# Patient Record
Sex: MALE | Race: WHITE | Employment: UNEMPLOYED | URBAN - NONMETROPOLITAN AREA
[De-identification: names, ages, dates, MRNs, and addresses within clinical notes are randomized per-mention and may not be internally consistent; named-entity substitution may affect disease eponyms.]

---

## 2018-04-28 ENCOUNTER — HOSPITAL ENCOUNTER (INPATIENT)
Age: 28
LOS: 1 days | Discharge: HOME | DRG: 897 | End: 2018-04-30
Attending: EMERGENCY MEDICINE | Admitting: PSYCHIATRY & NEUROLOGY
Payer: COMMERCIAL

## 2018-04-28 DIAGNOSIS — R44.0 AUDITORY HALLUCINATIONS: Primary | ICD-10-CM

## 2018-04-28 DIAGNOSIS — F29 PSYCHOSIS, UNSPECIFIED PSYCHOSIS TYPE (HCC): ICD-10-CM

## 2018-04-28 PROCEDURE — 99285 EMERGENCY DEPT VISIT HI MDM: CPT

## 2018-04-29 PROBLEM — F19.951: Status: ACTIVE | Noted: 2018-04-29

## 2018-04-29 PROBLEM — F19.94 SUBSTANCE INDUCED MOOD DISORDER (HCC): Status: ACTIVE | Noted: 2018-04-29

## 2018-04-29 LAB
ACETAMINOPHEN LEVEL: <15 UG/ML
ALBUMIN SERPL-MCNC: 4.8 G/DL (ref 3.5–5.2)
ALP BLD-CCNC: 71 U/L (ref 40–130)
ALT SERPL-CCNC: 7 U/L (ref 5–41)
AMPHETAMINE SCREEN, URINE: NEGATIVE
ANION GAP SERPL CALCULATED.3IONS-SCNC: 13 MMOL/L (ref 7–19)
AST SERPL-CCNC: 16 U/L (ref 5–40)
BARBITURATE SCREEN URINE: NEGATIVE
BASOPHILS ABSOLUTE: 0.1 K/UL (ref 0–0.2)
BASOPHILS RELATIVE PERCENT: 1 % (ref 0–1)
BENZODIAZEPINE SCREEN, URINE: NEGATIVE
BILIRUB SERPL-MCNC: <0.2 MG/DL (ref 0.2–1.2)
BUN BLDV-MCNC: 12 MG/DL (ref 6–20)
CALCIUM SERPL-MCNC: 8.7 MG/DL (ref 8.6–10)
CANNABINOID SCREEN URINE: NEGATIVE
CHLORIDE BLD-SCNC: 104 MMOL/L (ref 98–111)
CO2: 25 MMOL/L (ref 22–29)
COCAINE METABOLITE SCREEN URINE: NEGATIVE
CREAT SERPL-MCNC: 0.7 MG/DL (ref 0.5–1.2)
EOSINOPHILS ABSOLUTE: 0.2 K/UL (ref 0–0.6)
EOSINOPHILS RELATIVE PERCENT: 2.6 % (ref 0–5)
ETHANOL: 51 MG/DL (ref 0–0.08)
GFR NON-AFRICAN AMERICAN: >60
GLUCOSE BLD-MCNC: 103 MG/DL (ref 74–109)
HCT VFR BLD CALC: 47.3 % (ref 42–52)
HEMOGLOBIN: 15.6 G/DL (ref 14–18)
LYMPHOCYTES ABSOLUTE: 2.1 K/UL (ref 1.1–4.5)
LYMPHOCYTES RELATIVE PERCENT: 33.3 % (ref 20–40)
Lab: NORMAL
MCH RBC QN AUTO: 29.5 PG (ref 27–31)
MCHC RBC AUTO-ENTMCNC: 33 G/DL (ref 33–37)
MCV RBC AUTO: 89.4 FL (ref 80–94)
MONOCYTES ABSOLUTE: 0.3 K/UL (ref 0–0.9)
MONOCYTES RELATIVE PERCENT: 4.5 % (ref 0–10)
NEUTROPHILS ABSOLUTE: 3.7 K/UL (ref 1.5–7.5)
NEUTROPHILS RELATIVE PERCENT: 58.4 % (ref 50–65)
OPIATE SCREEN URINE: NEGATIVE
PDW BLD-RTO: 12.8 % (ref 11.5–14.5)
PLATELET # BLD: 288 K/UL (ref 130–400)
PMV BLD AUTO: 9.6 FL (ref 9.4–12.4)
POTASSIUM SERPL-SCNC: 4 MMOL/L (ref 3.5–5)
RBC # BLD: 5.29 M/UL (ref 4.7–6.1)
SALICYLATE, SERUM: <3 MG/DL (ref 3–10)
SODIUM BLD-SCNC: 142 MMOL/L (ref 136–145)
TOTAL PROTEIN: 7.8 G/DL (ref 6.6–8.7)
WBC # BLD: 6.3 K/UL (ref 4.8–10.8)

## 2018-04-29 PROCEDURE — 99284 EMERGENCY DEPT VISIT MOD MDM: CPT | Performed by: EMERGENCY MEDICINE

## 2018-04-29 PROCEDURE — G0480 DRUG TEST DEF 1-7 CLASSES: HCPCS

## 2018-04-29 PROCEDURE — 6370000000 HC RX 637 (ALT 250 FOR IP): Performed by: PSYCHIATRY & NEUROLOGY

## 2018-04-29 PROCEDURE — 36415 COLL VENOUS BLD VENIPUNCTURE: CPT

## 2018-04-29 PROCEDURE — 85025 COMPLETE CBC W/AUTO DIFF WBC: CPT

## 2018-04-29 PROCEDURE — 80307 DRUG TEST PRSMV CHEM ANLYZR: CPT

## 2018-04-29 PROCEDURE — 1240000000 HC EMOTIONAL WELLNESS R&B

## 2018-04-29 PROCEDURE — 80053 COMPREHEN METABOLIC PANEL: CPT

## 2018-04-29 PROCEDURE — 99222 1ST HOSP IP/OBS MODERATE 55: CPT | Performed by: NURSE PRACTITIONER

## 2018-04-29 RX ORDER — ACETAMINOPHEN 325 MG/1
650 TABLET ORAL EVERY 4 HOURS PRN
Status: DISCONTINUED | OUTPATIENT
Start: 2018-04-29 | End: 2018-04-30 | Stop reason: HOSPADM

## 2018-04-29 RX ORDER — RISPERIDONE 0.5 MG/1
0.25 TABLET, FILM COATED ORAL 2 TIMES DAILY
Status: DISCONTINUED | OUTPATIENT
Start: 2018-04-29 | End: 2018-04-30 | Stop reason: HOSPADM

## 2018-04-29 RX ADMIN — RISPERIDONE 0.25 MG: 0.5 TABLET, FILM COATED ORAL at 11:50

## 2018-04-29 RX ADMIN — RISPERIDONE 0.25 MG: 0.5 TABLET, FILM COATED ORAL at 21:06

## 2018-04-29 ASSESSMENT — SLEEP AND FATIGUE QUESTIONNAIRES
DO YOU HAVE DIFFICULTY SLEEPING: NO
AVERAGE NUMBER OF SLEEP HOURS: 10
DO YOU USE A SLEEP AID: NO

## 2018-04-29 ASSESSMENT — ENCOUNTER SYMPTOMS
EYES NEGATIVE: 1
GASTROINTESTINAL NEGATIVE: 1
RESPIRATORY NEGATIVE: 1

## 2018-04-29 ASSESSMENT — PATIENT HEALTH QUESTIONNAIRE - PHQ9: SUM OF ALL RESPONSES TO PHQ QUESTIONS 1-9: 10

## 2018-04-29 ASSESSMENT — LIFESTYLE VARIABLES: HISTORY_ALCOHOL_USE: YES

## 2018-04-30 VITALS
WEIGHT: 148.8 LBS | OXYGEN SATURATION: 98 % | HEIGHT: 71 IN | TEMPERATURE: 97.6 F | SYSTOLIC BLOOD PRESSURE: 103 MMHG | DIASTOLIC BLOOD PRESSURE: 70 MMHG | BODY MASS INDEX: 20.83 KG/M2 | RESPIRATION RATE: 16 BRPM | HEART RATE: 90 BPM

## 2018-04-30 PROCEDURE — 6370000000 HC RX 637 (ALT 250 FOR IP): Performed by: PSYCHIATRY & NEUROLOGY

## 2018-04-30 PROCEDURE — 99238 HOSP IP/OBS DSCHRG MGMT 30/<: CPT | Performed by: PSYCHIATRY & NEUROLOGY

## 2018-04-30 PROCEDURE — 5130000000 HC BRIDGE APPOINTMENT

## 2018-04-30 RX ORDER — RISPERIDONE 0.25 MG/1
0.25 TABLET, FILM COATED ORAL 2 TIMES DAILY
Qty: 30 TABLET | Refills: 0 | Status: SHIPPED | OUTPATIENT
Start: 2018-04-30 | End: 2020-03-23

## 2018-04-30 RX ADMIN — RISPERIDONE 0.25 MG: 0.5 TABLET, FILM COATED ORAL at 08:32

## 2018-05-10 ENCOUNTER — OFFICE VISIT (OUTPATIENT)
Dept: RETAIL CLINIC | Facility: CLINIC | Age: 28
End: 2018-05-10

## 2018-05-10 VITALS
BODY MASS INDEX: 21.31 KG/M2 | HEART RATE: 104 BPM | DIASTOLIC BLOOD PRESSURE: 64 MMHG | WEIGHT: 152.2 LBS | RESPIRATION RATE: 20 BRPM | TEMPERATURE: 98.2 F | OXYGEN SATURATION: 98 % | HEIGHT: 71 IN | SYSTOLIC BLOOD PRESSURE: 110 MMHG

## 2018-05-10 DIAGNOSIS — H10.31 ACUTE BACTERIAL CONJUNCTIVITIS OF RIGHT EYE: Primary | ICD-10-CM

## 2018-05-10 PROCEDURE — 99203 OFFICE O/P NEW LOW 30 MIN: CPT | Performed by: ADVANCED PRACTICE MIDWIFE

## 2018-05-10 RX ORDER — NEOMYCIN/POLYMYXIN B/HYDROCORT 3.5-10K-1
2 SUSPENSION, DROPS(FINAL DOSAGE FORM)(ML) OPHTHALMIC (EYE)
Qty: 7.5 ML | Refills: 0 | Status: SHIPPED | OUTPATIENT
Start: 2018-05-10

## 2018-05-10 NOTE — PROGRESS NOTES
Chief Complaint   Patient presents with   • Eye Pain     Subjective   Figueroa Swann is a 27 y.o. male who presents to the clinic today with complaints   Eye Pain    The right eye is affected. This is a new problem. Episode onset: 2 days. The problem occurs rarely. The problem has been gradually worsening. There was no injury mechanism. The pain is moderate. There is no known exposure to pink eye. He wears contacts (states took that contact out and threw it away; not wearing corrective lens in the affected eye). Associated symptoms include an eye discharge (this morning it was matted; last night there was also discharge from the eye), eye redness and a foreign body sensation. Pertinent negatives include no double vision, fever, itching, nausea, photophobia, recent URI or vomiting. Blurred vision: no more than usual when not wearing corrective lens. He has tried water for the symptoms. The treatment provided no relief.         Current Outpatient Prescriptions:   None    Allergies:  Review of patient's allergies indicates no known allergies.    Past Medical History:   Diagnosis Date   • Allergic    • Anxiety    • Bipolar disorder    • Depression      History reviewed. No pertinent surgical history.  History reviewed. No pertinent family history.  Social History   Substance Use Topics   • Smoking status: Former Smoker   • Smokeless tobacco: Not on file   • Alcohol use Not on file       Review of Systems  Review of Systems   Constitutional: Negative for fever.   HENT: Positive for rhinorrhea (clear) and sneezing. Negative for ear discharge, ear pain (feel a little plugged) and sore throat.    Eyes: Positive for pain, discharge (this morning it was matted; last night there was also discharge from the eye) and redness. Negative for double vision, photophobia, itching and visual disturbance (see HPI). Blurred vision: no more than usual when not wearing corrective lens.   Gastrointestinal: Negative for nausea and  "vomiting.   Skin: Negative for itching.   All other systems reviewed and are negative.      Objective   /64 (BP Location: Left arm, Patient Position: Sitting, Cuff Size: Adult)   Pulse 104   Temp 98.2 °F (36.8 °C) (Oral)   Resp 20   Ht 180.3 cm (71\")   Wt 69 kg (152 lb 3.2 oz)   SpO2 98%   BMI 21.23 kg/m²       Physical Exam   Constitutional: He is oriented to person, place, and time. Vital signs are normal. He appears well-developed and well-nourished. He is cooperative. He does not appear ill. No distress.   HENT:   Head: Normocephalic.   Right Ear: Hearing, tympanic membrane, external ear and ear canal normal.   Left Ear: Hearing, tympanic membrane, external ear and ear canal normal.   Nose: Nose normal.   Mouth/Throat: Uvula is midline, oropharynx is clear and moist and mucous membranes are normal. No oropharyngeal exudate. Tonsils are 1+ on the right. Tonsils are 1+ on the left. No tonsillar exudate.   Eyes: EOM and lids are normal. Pupils are equal, round, and reactive to light. Right eye exhibits chemosis. Right eye exhibits no discharge and no exudate. Left eye exhibits no discharge and no exudate. Right conjunctiva is injected. Right conjunctiva has no hemorrhage. Left conjunctiva is not injected. Left conjunctiva has no hemorrhage.   Cardiovascular: Normal rate, regular rhythm and normal heart sounds.    Pulmonary/Chest: Effort normal and breath sounds normal. No respiratory distress. He has no wheezes.   Neurological: He is alert and oriented to person, place, and time.   Skin: Skin is warm and dry.   Psychiatric: He has a normal mood and affect. His behavior is normal.       Assessment/Plan     Figueroa was seen today for eye pain.    Diagnoses and all orders for this visit:    Acute bacterial conjunctivitis of right eye  -     neomycin-polymyxin-hydrocortisone (CORTISPORIN) 3.5-61011-9 ophthalmic suspension; Administer 2 drops to both eyes Every 4 (Four) Hours While Awake.            See " patient education instructions. Do not wear contact lenses while using eye gtts. Wait for the irritation to be completely gone. We will treat both eyes to prevent spread of infection to unaffected eye. Wash hands thoroughly. Change pillow case nightly to prevent reinfection. If you experience any visual disturbances out of your usual due to not wearing corrective lenses and/or if there is no improvement after 48 hours use of gtts or if you have more eye pain, go immediately to your eye doctor or the ER for further evaluation.

## 2018-05-10 NOTE — PATIENT INSTRUCTIONS
Bacterial Conjunctivitis  Bacterial conjunctivitis is an infection of your conjunctiva. This is the clear membrane that covers the white part of your eye and the inner surface of your eyelid. This condition can make your eye:  · Red or pink.  · Itchy.  This condition is caused by bacteria. This condition spreads very easily from person to person (is contagious) and from one eye to the other eye.  Follow these instructions at home:  Medicines   · Take or apply your antibiotic medicine as told by your doctor. Do not stop taking or applying the antibiotic even if you start to feel better.  · Take or apply over-the-counter and prescription medicines only as told by your doctor.  · Do not touch your eyelid with the eye drop bottle or the ointment tube.  Managing discomfort   · Wipe any fluid from your eye with a warm, wet washcloth or a cotton ball.  · Place a cool, clean washcloth on your eye. Do this for 10-20 minutes, 3-4 times per day.  General instructions   · Do not wear contact lenses until the irritation is gone. Wear glasses until your doctor says it is okay to wear contacts.  · Do not wear eye makeup until your symptoms are gone. Throw away any old makeup.  · Change or wash your pillowcase every day.  · Do not share towels or washcloths with anyone.  · Wash your hands often with soap and water. Use paper towels to dry your hands.  · Do not touch or rub your eyes.  · Do not drive or use heavy machinery if your vision is blurry.  Contact a doctor if:  · You have a fever.  · Your symptoms do not get better after 10 days.  Get help right away if:  · You have a fever and your symptoms suddenly get worse.  · You have very bad pain when you move your eye.  · Your face:  ¨ Hurts.  ¨ Is red.  ¨ Is swollen.  · You have sudden loss of vision.  This information is not intended to replace advice given to you by your health care provider. Make sure you discuss any questions you have with your health care provider.  Document  Released: 09/26/2009 Document Revised: 05/25/2017 Document Reviewed: 09/29/2016  SmartPill Interactive Patient Education © 2017 [employer]  Dexamethasone; Neomycin; Polymyxin B ophthalmic suspension  What is this medicine?  DEXAMETHASONE; NEOMYCIN; POLYMYXIN B (dex a METH a sone; nee oh MYE sin; cindy i MIX in B) is a combination of a steroid and antibiotics. It helps to reduce swelling, redness, and itching of the eye. It also is used to treat eye infections.  This medicine may be used for other purposes; ask your health care provider or pharmacist if you have questions.  COMMON BRAND NAME(S): AK-Trol, Dexacidin, Dexasporin, Gatol, Maxitrol, Methadex, NeoPolyDex, Ocu-Trol, Poly-Dex  What should I tell my health care provider before I take this medicine?  They need to know if you have any of these conditions:  -any other active infection  -glaucoma  -wear contact lenses  -an unusual or allergic reaction to dexamethasone, neomycin, polymyxin B, corticosteroids, other medicines, foods, dyes, or preservatives  -pregnant or trying to get pregnant  -breast-feeding  How should I use this medicine?  This medicine is only for use in the eye. Follow the directions on the prescription label. Wash hands before and after use. Shake suspension well before using. Tilt your head back slightly and pull your lower eyelid down with your index finger to form a pouch. Try not to touch the tip of the dropper to your eye, fingertips, or other surface. Squeeze the prescribed number of drops into the pouch. Close the eye gently to spread the drops. Do not use your medicine more often than directed. Finish the full course of medicine prescribed by your doctor or health care professional even if think your condition is better. Do not stop using except on the advice of your doctor or health care professional. Do not use for longer than instructed by your doctor or health care professional.  Talk to your pediatrician regarding the use of this  medicine in children. Special care may be needed.  Overdosage: If you think you have taken too much of this medicine contact a poison control center or emergency room at once.  NOTE: This medicine is only for you. Do not share this medicine with others.  What if I miss a dose?  If you miss a dose, use it as soon as you can. If it is almost time for your next dose, use only that dose. Do not use double or extra doses.  What may interact with this medicine?  Interactions are not expected. Do not use any other eye products without asking your doctor or health care professional.  This list may not describe all possible interactions. Give your health care provider a list of all the medicines, herbs, non-prescription drugs, or dietary supplements you use. Also tell them if you smoke, drink alcohol, or use illegal drugs. Some items may interact with your medicine.  What should I watch for while using this medicine?  Check with your doctor or health care professional if your condition does not start to get better, or if it gets worse.  Tell your doctor or health care professional if you are exposed to anyone with measles or chickenpox, or if you develop sores or blisters that do not heal properly.  If you wear contact lenses, ask your doctor or health care professional when you can use your lenses again.  A burning or stinging reaction that does not go away may mean you are allergic to this product. Stop using and call your doctor or health care professional.  This medicine can make certain eye conditions worse. Only use it for conditions for which your doctor or health care professional has prescribed.  To prevent the spread of infection, do not share eye products, towels, and washcloths with anyone else.  What side effects may I notice from receiving this medicine?  Side effects that you should report to your doctor or health care professional as soon as possible:  -allergic reactions like skin rash, itching or hives,  swelling of the face, lips, or tongue  -changes in vision  -eye pain  -severe burning, stinging or swelling of the eyelids  -watery eyes  Side effects that usually do not require medical attention (report to your doctor or health care professional if they continue or are bothersome):  -mild burning, redness or stinging in the eye  -eye irritation, itching  -temporary watering or blurring of vision  This list may not describe all possible side effects. Call your doctor for medical advice about side effects. You may report side effects to FDA at 4-700-FDA-5709.  Where should I keep my medicine?  Keep out of the reach of children.  See product for storage instructions. Each product may have different instructions. Throw away any unused medicine after the expiration date.  NOTE: This sheet is a summary. It may not cover all possible information. If you have questions about this medicine, talk to your doctor, pharmacist, or health care provider.  © 2018 Elsevier/Gold Standard (2017-04-14 11:56:17)

## 2018-05-16 ENCOUNTER — HOSPITAL ENCOUNTER (EMERGENCY)
Age: 28
Discharge: LEFT BEFORE DISCHARGE | End: 2018-05-16
Payer: COMMERCIAL

## 2018-05-16 PROCEDURE — 4500000002 HC ER NO CHARGE

## 2018-06-01 ASSESSMENT — ENCOUNTER SYMPTOMS
CHEST TIGHTNESS: 0
SHORTNESS OF BREATH: 0
ABDOMINAL PAIN: 0

## 2020-03-23 ENCOUNTER — HOSPITAL ENCOUNTER (OUTPATIENT)
Age: 30
Setting detail: OBSERVATION
Discharge: HOME | End: 2020-03-24
Attending: EMERGENCY MEDICINE | Admitting: HOSPITALIST
Payer: MEDICAID

## 2020-03-23 PROBLEM — F29 PSYCHOSIS (HCC): Status: ACTIVE | Noted: 2020-03-23

## 2020-03-23 PROBLEM — F10.959: Status: ACTIVE | Noted: 2020-03-23

## 2020-03-23 PROBLEM — F10.11 HISTORY OF ALCOHOL ABUSE: Status: ACTIVE | Noted: 2020-03-23

## 2020-03-23 PROBLEM — E88.89 KETOSIS (HCC): Status: ACTIVE | Noted: 2020-03-23

## 2020-03-23 LAB
ACETAMINOPHEN LEVEL: <15 UG/ML
ALBUMIN SERPL-MCNC: 4.7 G/DL (ref 3.5–5.2)
ALP BLD-CCNC: 69 U/L (ref 40–130)
ALT SERPL-CCNC: 12 U/L (ref 5–41)
AMPHETAMINE SCREEN, URINE: NEGATIVE
ANION GAP SERPL CALCULATED.3IONS-SCNC: 22 MMOL/L (ref 7–19)
AST SERPL-CCNC: 33 U/L (ref 5–40)
BACTERIA: NEGATIVE /HPF
BARBITURATE SCREEN URINE: NEGATIVE
BASOPHILS ABSOLUTE: 0.1 K/UL (ref 0–0.2)
BASOPHILS RELATIVE PERCENT: 1 % (ref 0–1)
BENZODIAZEPINE SCREEN, URINE: NEGATIVE
BILIRUB SERPL-MCNC: 0.7 MG/DL (ref 0.2–1.2)
BILIRUBIN URINE: NEGATIVE
BLOOD, URINE: NEGATIVE
BUN BLDV-MCNC: 18 MG/DL (ref 6–20)
CALCIUM SERPL-MCNC: 9.5 MG/DL (ref 8.6–10)
CANNABINOID SCREEN URINE: NEGATIVE
CHLORIDE BLD-SCNC: 96 MMOL/L (ref 98–111)
CLARITY: CLEAR
CO2: 20 MMOL/L (ref 22–29)
COCAINE METABOLITE SCREEN URINE: NEGATIVE
COLOR: YELLOW
CREAT SERPL-MCNC: 0.8 MG/DL (ref 0.5–1.2)
EOSINOPHILS ABSOLUTE: 0 K/UL (ref 0–0.6)
EOSINOPHILS RELATIVE PERCENT: 0 % (ref 0–5)
EPITHELIAL CELLS, UA: 1 /HPF (ref 0–5)
ETHANOL: <10 MG/DL (ref 0–0.08)
GFR NON-AFRICAN AMERICAN: >60
GLUCOSE BLD-MCNC: 103 MG/DL (ref 74–109)
GLUCOSE BLD-MCNC: 108 MG/DL (ref 70–99)
GLUCOSE URINE: NEGATIVE MG/DL
HCT VFR BLD CALC: 39.8 % (ref 42–52)
HEMOGLOBIN: 13.7 G/DL (ref 14–18)
HYALINE CASTS: 4 /HPF (ref 0–8)
IMMATURE GRANULOCYTES #: 0 K/UL
INR BLD: 1.02 (ref 0.88–1.18)
KETONES, URINE: 80 MG/DL
LEUKOCYTE ESTERASE, URINE: NEGATIVE
LIPASE: 32 U/L (ref 13–60)
LYMPHOCYTES ABSOLUTE: 1.3 K/UL (ref 1.1–4.5)
LYMPHOCYTES RELATIVE PERCENT: 13.4 % (ref 20–40)
Lab: NORMAL
MCH RBC QN AUTO: 29.7 PG (ref 27–31)
MCHC RBC AUTO-ENTMCNC: 34.4 G/DL (ref 33–37)
MCV RBC AUTO: 86.3 FL (ref 80–94)
MONOCYTES ABSOLUTE: 0.4 K/UL (ref 0–0.9)
MONOCYTES RELATIVE PERCENT: 3.6 % (ref 0–10)
NEUTROPHILS ABSOLUTE: 7.9 K/UL (ref 1.5–7.5)
NEUTROPHILS RELATIVE PERCENT: 81.8 % (ref 50–65)
NITRITE, URINE: NEGATIVE
OPIATE SCREEN URINE: NEGATIVE
PDW BLD-RTO: 12.4 % (ref 11.5–14.5)
PERFORMED ON: ABNORMAL
PH UA: 6 (ref 5–8)
PLATELET # BLD: 319 K/UL (ref 130–400)
PMV BLD AUTO: 10 FL (ref 9.4–12.4)
POTASSIUM SERPL-SCNC: 4 MMOL/L (ref 3.5–5)
PROTEIN UA: 30 MG/DL
PROTHROMBIN TIME: 13.4 SEC (ref 12–14.6)
RBC # BLD: 4.61 M/UL (ref 4.7–6.1)
RBC UA: 3 /HPF (ref 0–4)
REASON FOR REJECTION: NORMAL
REJECTED TEST: NORMAL
SALICYLATE, SERUM: <3 MG/DL (ref 3–10)
SODIUM BLD-SCNC: 138 MMOL/L (ref 136–145)
SPECIFIC GRAVITY UA: 1.03 (ref 1–1.03)
TOTAL PROTEIN: 7.7 G/DL (ref 6.6–8.7)
URINE REFLEX TO CULTURE: ABNORMAL
UROBILINOGEN, URINE: 0.2 E.U./DL
WBC # BLD: 9.6 K/UL (ref 4.8–10.8)
WBC UA: 1 /HPF (ref 0–5)

## 2020-03-23 PROCEDURE — 85610 PROTHROMBIN TIME: CPT

## 2020-03-23 PROCEDURE — 96375 TX/PRO/DX INJ NEW DRUG ADDON: CPT

## 2020-03-23 PROCEDURE — 36415 COLL VENOUS BLD VENIPUNCTURE: CPT

## 2020-03-23 PROCEDURE — 82947 ASSAY GLUCOSE BLOOD QUANT: CPT

## 2020-03-23 PROCEDURE — 80307 DRUG TEST PRSMV CHEM ANLYZR: CPT

## 2020-03-23 PROCEDURE — G0378 HOSPITAL OBSERVATION PER HR: HCPCS

## 2020-03-23 PROCEDURE — 81001 URINALYSIS AUTO W/SCOPE: CPT

## 2020-03-23 PROCEDURE — 83690 ASSAY OF LIPASE: CPT

## 2020-03-23 PROCEDURE — G0480 DRUG TEST DEF 1-7 CLASSES: HCPCS

## 2020-03-23 PROCEDURE — 6360000002 HC RX W HCPCS: Performed by: PHYSICIAN ASSISTANT

## 2020-03-23 PROCEDURE — 80053 COMPREHEN METABOLIC PANEL: CPT

## 2020-03-23 PROCEDURE — 96361 HYDRATE IV INFUSION ADD-ON: CPT

## 2020-03-23 PROCEDURE — 96374 THER/PROPH/DIAG INJ IV PUSH: CPT

## 2020-03-23 PROCEDURE — 6370000000 HC RX 637 (ALT 250 FOR IP): Performed by: PHYSICIAN ASSISTANT

## 2020-03-23 PROCEDURE — 6360000002 HC RX W HCPCS: Performed by: EMERGENCY MEDICINE

## 2020-03-23 PROCEDURE — 85025 COMPLETE CBC W/AUTO DIFF WBC: CPT

## 2020-03-23 PROCEDURE — 96372 THER/PROPH/DIAG INJ SC/IM: CPT

## 2020-03-23 PROCEDURE — 99285 EMERGENCY DEPT VISIT HI MDM: CPT

## 2020-03-23 PROCEDURE — 6370000000 HC RX 637 (ALT 250 FOR IP): Performed by: EMERGENCY MEDICINE

## 2020-03-23 PROCEDURE — 2500000003 HC RX 250 WO HCPCS: Performed by: EMERGENCY MEDICINE

## 2020-03-23 PROCEDURE — 2580000003 HC RX 258: Performed by: EMERGENCY MEDICINE

## 2020-03-23 PROCEDURE — 2580000003 HC RX 258: Performed by: PHYSICIAN ASSISTANT

## 2020-03-23 RX ORDER — LORAZEPAM 1 MG/1
1 TABLET ORAL
Status: DISCONTINUED | OUTPATIENT
Start: 2020-03-23 | End: 2020-03-24 | Stop reason: HOSPADM

## 2020-03-23 RX ORDER — LORAZEPAM 2 MG/ML
2 INJECTION INTRAMUSCULAR
Status: DISCONTINUED | OUTPATIENT
Start: 2020-03-23 | End: 2020-03-23

## 2020-03-23 RX ORDER — LORAZEPAM 1 MG/1
3 TABLET ORAL
Status: DISCONTINUED | OUTPATIENT
Start: 2020-03-23 | End: 2020-03-23

## 2020-03-23 RX ORDER — LORAZEPAM 1 MG/1
2 TABLET ORAL
Status: DISCONTINUED | OUTPATIENT
Start: 2020-03-23 | End: 2020-03-24 | Stop reason: HOSPADM

## 2020-03-23 RX ORDER — LORAZEPAM 2 MG/ML
1 INJECTION INTRAMUSCULAR
Status: DISCONTINUED | OUTPATIENT
Start: 2020-03-23 | End: 2020-03-24 | Stop reason: HOSPADM

## 2020-03-23 RX ORDER — SODIUM CHLORIDE 0.9 % (FLUSH) 0.9 %
10 SYRINGE (ML) INJECTION PRN
Status: DISCONTINUED | OUTPATIENT
Start: 2020-03-23 | End: 2020-03-24 | Stop reason: HOSPADM

## 2020-03-23 RX ORDER — SODIUM CHLORIDE 9 MG/ML
INJECTION, SOLUTION INTRAVENOUS CONTINUOUS
Status: DISCONTINUED | OUTPATIENT
Start: 2020-03-23 | End: 2020-03-24

## 2020-03-23 RX ORDER — LORAZEPAM 1 MG/1
1 TABLET ORAL ONCE
Status: COMPLETED | OUTPATIENT
Start: 2020-03-23 | End: 2020-03-23

## 2020-03-23 RX ORDER — LORAZEPAM 2 MG/ML
1 INJECTION INTRAMUSCULAR ONCE
Status: COMPLETED | OUTPATIENT
Start: 2020-03-23 | End: 2020-03-23

## 2020-03-23 RX ORDER — FAMOTIDINE 20 MG/1
20 TABLET, FILM COATED ORAL 2 TIMES DAILY
Status: DISCONTINUED | OUTPATIENT
Start: 2020-03-23 | End: 2020-03-24 | Stop reason: HOSPADM

## 2020-03-23 RX ORDER — MAGNESIUM SULFATE IN WATER 40 MG/ML
2 INJECTION, SOLUTION INTRAVENOUS PRN
Status: DISCONTINUED | OUTPATIENT
Start: 2020-03-23 | End: 2020-03-24 | Stop reason: HOSPADM

## 2020-03-23 RX ORDER — LORAZEPAM 2 MG/ML
4 INJECTION INTRAMUSCULAR
Status: DISCONTINUED | OUTPATIENT
Start: 2020-03-23 | End: 2020-03-23

## 2020-03-23 RX ORDER — POTASSIUM CHLORIDE 7.45 MG/ML
10 INJECTION INTRAVENOUS PRN
Status: DISCONTINUED | OUTPATIENT
Start: 2020-03-23 | End: 2020-03-24 | Stop reason: HOSPADM

## 2020-03-23 RX ORDER — LORAZEPAM 1 MG/1
4 TABLET ORAL
Status: DISCONTINUED | OUTPATIENT
Start: 2020-03-23 | End: 2020-03-23

## 2020-03-23 RX ORDER — ACETAMINOPHEN 650 MG/1
650 SUPPOSITORY RECTAL EVERY 6 HOURS PRN
Status: DISCONTINUED | OUTPATIENT
Start: 2020-03-23 | End: 2020-03-24 | Stop reason: HOSPADM

## 2020-03-23 RX ORDER — PROMETHAZINE HYDROCHLORIDE 12.5 MG/1
12.5 TABLET ORAL EVERY 6 HOURS PRN
Status: DISCONTINUED | OUTPATIENT
Start: 2020-03-23 | End: 2020-03-24 | Stop reason: HOSPADM

## 2020-03-23 RX ORDER — ACETAMINOPHEN 325 MG/1
650 TABLET ORAL EVERY 6 HOURS PRN
Status: DISCONTINUED | OUTPATIENT
Start: 2020-03-23 | End: 2020-03-24 | Stop reason: HOSPADM

## 2020-03-23 RX ORDER — LORAZEPAM 2 MG/ML
3 INJECTION INTRAMUSCULAR
Status: DISCONTINUED | OUTPATIENT
Start: 2020-03-23 | End: 2020-03-23

## 2020-03-23 RX ORDER — ONDANSETRON 2 MG/ML
4 INJECTION INTRAMUSCULAR; INTRAVENOUS EVERY 6 HOURS PRN
Status: DISCONTINUED | OUTPATIENT
Start: 2020-03-23 | End: 2020-03-24 | Stop reason: HOSPADM

## 2020-03-23 RX ORDER — POTASSIUM CHLORIDE 20 MEQ/1
40 TABLET, EXTENDED RELEASE ORAL PRN
Status: DISCONTINUED | OUTPATIENT
Start: 2020-03-23 | End: 2020-03-24 | Stop reason: HOSPADM

## 2020-03-23 RX ORDER — NICOTINE 21 MG/24HR
1 PATCH, TRANSDERMAL 24 HOURS TRANSDERMAL DAILY
Status: DISCONTINUED | OUTPATIENT
Start: 2020-03-23 | End: 2020-03-24 | Stop reason: HOSPADM

## 2020-03-23 RX ORDER — SODIUM CHLORIDE 0.9 % (FLUSH) 0.9 %
10 SYRINGE (ML) INJECTION EVERY 12 HOURS SCHEDULED
Status: DISCONTINUED | OUTPATIENT
Start: 2020-03-23 | End: 2020-03-24 | Stop reason: HOSPADM

## 2020-03-23 RX ORDER — FOLIC ACID 1 MG/1
1 TABLET ORAL DAILY
Status: DISCONTINUED | OUTPATIENT
Start: 2020-03-23 | End: 2020-03-24 | Stop reason: HOSPADM

## 2020-03-23 RX ORDER — THIAMINE MONONITRATE (VIT B1) 100 MG
100 TABLET ORAL DAILY
Status: DISCONTINUED | OUTPATIENT
Start: 2020-03-23 | End: 2020-03-24 | Stop reason: HOSPADM

## 2020-03-23 RX ORDER — MULTIVITAMIN WITH FOLIC ACID 400 MCG
1 TABLET ORAL DAILY
Status: DISCONTINUED | OUTPATIENT
Start: 2020-03-23 | End: 2020-03-24 | Stop reason: HOSPADM

## 2020-03-23 RX ADMIN — LORAZEPAM 1 MG: 1 TABLET ORAL at 13:41

## 2020-03-23 RX ADMIN — Medication 100 MG: at 15:17

## 2020-03-23 RX ADMIN — SODIUM CHLORIDE: 9 INJECTION, SOLUTION INTRAVENOUS at 15:18

## 2020-03-23 RX ADMIN — FOLIC ACID 1 MG: 1 TABLET ORAL at 15:17

## 2020-03-23 RX ADMIN — SODIUM CHLORIDE, PRESERVATIVE FREE 10 ML: 5 INJECTION INTRAVENOUS at 21:05

## 2020-03-23 RX ADMIN — THERA TABS 1 TABLET: TAB at 15:17

## 2020-03-23 RX ADMIN — FOLIC ACID: 5 INJECTION, SOLUTION INTRAMUSCULAR; INTRAVENOUS; SUBCUTANEOUS at 12:26

## 2020-03-23 RX ADMIN — FAMOTIDINE 20 MG: 20 TABLET ORAL at 21:05

## 2020-03-23 RX ADMIN — ENOXAPARIN SODIUM 40 MG: 40 INJECTION SUBCUTANEOUS at 15:17

## 2020-03-23 RX ADMIN — SODIUM CHLORIDE: 9 INJECTION, SOLUTION INTRAVENOUS at 23:16

## 2020-03-23 RX ADMIN — FAMOTIDINE 20 MG: 20 TABLET ORAL at 15:17

## 2020-03-23 RX ADMIN — LORAZEPAM 1 MG: 2 INJECTION INTRAMUSCULAR; INTRAVENOUS at 13:41

## 2020-03-23 ASSESSMENT — PATIENT HEALTH QUESTIONNAIRE - PHQ9: SUM OF ALL RESPONSES TO PHQ QUESTIONS 1-9: 10

## 2020-03-23 ASSESSMENT — ENCOUNTER SYMPTOMS
ABDOMINAL PAIN: 0
BACK PAIN: 0
COUGH: 0
CHEST TIGHTNESS: 0
SHORTNESS OF BREATH: 0
NAUSEA: 1
EYE ITCHING: 0
EYE PAIN: 0
SORE THROAT: 0
WHEEZING: 0
SINUS PRESSURE: 0
ABDOMINAL PAIN: 1
ABDOMINAL DISTENTION: 0
PHOTOPHOBIA: 0
DIARRHEA: 0
SINUS PAIN: 0

## 2020-03-23 ASSESSMENT — PAIN SCALES - GENERAL
PAINLEVEL_OUTOF10: 0
PAINLEVEL_OUTOF10: 0

## 2020-03-23 NOTE — H&P
Raul Bills - History & Physical      PCP: No primary care provider on file. Date of Admission: 3/23/2020    Date of Service: 3/23/2020    Chief Complaint:  \"Hearing voices\"    History Of Present Illness: The patient is a 34 y.o. male PMH of Bipolar 1, PTSD, and alcohol abuse who presented to [address] [place] [address] ED \"because the voices told him to\". Patient is resting in bed upon evaluation in no acute distress. He appears anxious and nervous. Patient states that he was released from prison on Thursday and has been \"sleeping on the streets in a sleeping bag\" since. Patient admits to drinking daily since Thursday and states his last drink was this morning around 7am. Patient endorses nausea, shakiness, abdominal pain, and feeling his heart \"flop around\" over the last few days. He states when he gets feeling this way and really \"sick\" he starts to feel \"worthless\". He admits to leg pain/cramping. He denies chest pain, shortness of breath, and dizziness. He denies hearing any voices, or visual disturbances at this moment. In ED patient was given Ativan 2mg and banana bag. Patient is tachycardic at arrival 130 bpm with no fever and BP at 107/89. Labs reveal pt has Ketones and protein in his urine. Negative Urine drug screen and ETOH <10. Psych has been consulted and agreed to accept patient tomorrow once medicine has determined the pt does not have psychosis and delusions from alcohol withdrawal.     Past Medical History:        Diagnosis Date    Bipolar 1 disorder ([facility]     Post traumatic stress disorder (PTSD)        Past Surgical History:        Procedure Laterality Date    OTHER SURGICAL HISTORY      cut tendon in right little finger       Home Medications:  Prior to Admission medications    Not on File       Allergies:    Patient has no known allergies. Social History:    The patient currently is homeless  Tobacco:   reports that he has been smoking.  He has been smoking about 1.00 pack per day. He has never used smokeless tobacco.  Alcohol:   reports current alcohol use. Illicit Drugs: denies    Family History:      Problem Relation Age of Onset    Other Father        Review of Systems:   Review of Systems   Constitutional: Negative for chills, diaphoresis, fatigue and fever. HENT: Negative for congestion, ear pain, sinus pressure, sinus pain and sore throat. Eyes: Negative for photophobia, pain and itching. Respiratory: Negative for cough, chest tightness, shortness of breath and wheezing. Cardiovascular: Negative for chest pain, palpitations and leg swelling. Gastrointestinal: Positive for abdominal pain and nausea. Negative for abdominal distention and diarrhea. Endocrine: Negative for cold intolerance and heat intolerance. Genitourinary: Negative for difficulty urinating, dysuria, flank pain, frequency and urgency. Musculoskeletal: Positive for myalgias. Negative for joint swelling. Skin: Negative for pallor, rash and wound. Neurological: Positive for tremors. Negative for dizziness, syncope, weakness, light-headedness, numbness and headaches. Hematological: Does not bruise/bleed easily. Psychiatric/Behavioral: Positive for suicidal ideas. Negative for agitation, confusion, dysphoric mood, hallucinations and self-injury. The patient is nervous/anxious. Physical Examination:  VITALS: /76   Pulse 114   Temp 98.2 °F (36.8 °C) (Temporal)   Resp 16   SpO2 96%   Physical Exam  Constitutional:       General: He is not in acute distress. Appearance: He is not ill-appearing, toxic-appearing or diaphoretic. Comments: Appears anxious and worried   HENT:      Head: Normocephalic and atraumatic. Right Ear: External ear normal.      Left Ear: External ear normal.      Nose: Nose normal.      Mouth/Throat:      Mouth: Mucous membranes are moist.      Pharynx: Oropharynx is clear. Eyes:      General: No scleral icterus.      Extraocular Movements: Problem:    Psychosis (HCC)/ History of Alcohol abuse- [place] protocol, suicide precautions, fall/seizure/aspiration precautions. Banana bag received in ED. IV fluids. Thiamine, Folic acid and multivitamin po. Sitter at bedside. Social work for discharge planning. Active Problems:    Ketosis ([address]- urine ketones 80. Post traumatic stress disorder (PTSD)    Bipolar 1 disorder ([address]    -noted. Psych consultation and planing on admitting pt tomorrow.        Signed:  Linda Victor PA-C  3/23/2020, 2:48 PM
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
## 2020-03-24 VITALS
RESPIRATION RATE: 18 BRPM | HEART RATE: 87 BPM | OXYGEN SATURATION: 99 % | TEMPERATURE: 97.2 F | DIASTOLIC BLOOD PRESSURE: 81 MMHG | SYSTOLIC BLOOD PRESSURE: 126 MMHG

## 2020-03-24 LAB
ALBUMIN SERPL-MCNC: 3.7 G/DL (ref 3.5–5.2)
ALP BLD-CCNC: 64 U/L (ref 40–130)
ALT SERPL-CCNC: 7 U/L (ref 5–41)
ANION GAP SERPL CALCULATED.3IONS-SCNC: 10 MMOL/L (ref 7–19)
AST SERPL-CCNC: 20 U/L (ref 5–40)
BASOPHILS ABSOLUTE: 0.1 K/UL (ref 0–0.2)
BASOPHILS RELATIVE PERCENT: 0.9 % (ref 0–1)
BILIRUB SERPL-MCNC: 0.5 MG/DL (ref 0.2–1.2)
BUN BLDV-MCNC: 15 MG/DL (ref 6–20)
CALCIUM SERPL-MCNC: 8.6 MG/DL (ref 8.6–10)
CHLORIDE BLD-SCNC: 104 MMOL/L (ref 98–111)
CO2: 25 MMOL/L (ref 22–29)
CREAT SERPL-MCNC: 0.7 MG/DL (ref 0.5–1.2)
EOSINOPHILS ABSOLUTE: 0.1 K/UL (ref 0–0.6)
EOSINOPHILS RELATIVE PERCENT: 1.5 % (ref 0–5)
GFR NON-AFRICAN AMERICAN: >60
GLUCOSE BLD-MCNC: 101 MG/DL (ref 74–109)
HCT VFR BLD CALC: 36.3 % (ref 42–52)
HEMOGLOBIN: 12.1 G/DL (ref 14–18)
IMMATURE GRANULOCYTES #: 0 K/UL
LYMPHOCYTES ABSOLUTE: 2.5 K/UL (ref 1.1–4.5)
LYMPHOCYTES RELATIVE PERCENT: 45.6 % (ref 20–40)
MCH RBC QN AUTO: 29.4 PG (ref 27–31)
MCHC RBC AUTO-ENTMCNC: 33.3 G/DL (ref 33–37)
MCV RBC AUTO: 88.3 FL (ref 80–94)
MONOCYTES ABSOLUTE: 0.4 K/UL (ref 0–0.9)
MONOCYTES RELATIVE PERCENT: 7.1 % (ref 0–10)
NEUTROPHILS ABSOLUTE: 2.5 K/UL (ref 1.5–7.5)
NEUTROPHILS RELATIVE PERCENT: 44.7 % (ref 50–65)
PDW BLD-RTO: 12.4 % (ref 11.5–14.5)
PLATELET # BLD: 232 K/UL (ref 130–400)
PMV BLD AUTO: 10.2 FL (ref 9.4–12.4)
POTASSIUM REFLEX MAGNESIUM: 4 MMOL/L (ref 3.5–5)
RBC # BLD: 4.11 M/UL (ref 4.7–6.1)
SODIUM BLD-SCNC: 139 MMOL/L (ref 136–145)
TOTAL PROTEIN: 6.1 G/DL (ref 6.6–8.7)
TSH SERPL DL<=0.05 MIU/L-ACNC: 1.34 UIU/ML (ref 0.27–4.2)
WBC # BLD: 5.5 K/UL (ref 4.8–10.8)

## 2020-03-24 PROCEDURE — 96361 HYDRATE IV INFUSION ADD-ON: CPT

## 2020-03-24 PROCEDURE — G0378 HOSPITAL OBSERVATION PER HR: HCPCS

## 2020-03-24 PROCEDURE — 99213 OFFICE O/P EST LOW 20 MIN: CPT | Performed by: PSYCHIATRY & NEUROLOGY

## 2020-03-24 PROCEDURE — 2580000003 HC RX 258: Performed by: PHYSICIAN ASSISTANT

## 2020-03-24 PROCEDURE — 36415 COLL VENOUS BLD VENIPUNCTURE: CPT

## 2020-03-24 PROCEDURE — 84443 ASSAY THYROID STIM HORMONE: CPT

## 2020-03-24 PROCEDURE — 6370000000 HC RX 637 (ALT 250 FOR IP): Performed by: PHYSICIAN ASSISTANT

## 2020-03-24 PROCEDURE — 80053 COMPREHEN METABOLIC PANEL: CPT

## 2020-03-24 PROCEDURE — 85025 COMPLETE CBC W/AUTO DIFF WBC: CPT

## 2020-03-24 RX ORDER — PROMETHAZINE HYDROCHLORIDE 12.5 MG/1
12.5 TABLET ORAL EVERY 6 HOURS PRN
Status: CANCELLED | OUTPATIENT
Start: 2020-03-24

## 2020-03-24 RX ORDER — SODIUM CHLORIDE 9 MG/ML
INJECTION, SOLUTION INTRAVENOUS CONTINUOUS
Status: CANCELLED | OUTPATIENT
Start: 2020-03-24

## 2020-03-24 RX ORDER — THIAMINE MONONITRATE (VIT B1) 100 MG
100 TABLET ORAL DAILY
Status: CANCELLED | OUTPATIENT
Start: 2020-03-24

## 2020-03-24 RX ORDER — FAMOTIDINE 20 MG/1
20 TABLET, FILM COATED ORAL 2 TIMES DAILY
Status: CANCELLED | OUTPATIENT
Start: 2020-03-24

## 2020-03-24 RX ORDER — MULTIVITAMIN WITH FOLIC ACID 400 MCG
1 TABLET ORAL DAILY
Status: CANCELLED | OUTPATIENT
Start: 2020-03-24

## 2020-03-24 RX ORDER — SODIUM CHLORIDE 0.9 % (FLUSH) 0.9 %
10 SYRINGE (ML) INJECTION PRN
Status: CANCELLED | OUTPATIENT
Start: 2020-03-24

## 2020-03-24 RX ORDER — SODIUM CHLORIDE 0.9 % (FLUSH) 0.9 %
10 SYRINGE (ML) INJECTION EVERY 12 HOURS SCHEDULED
Status: CANCELLED | OUTPATIENT
Start: 2020-03-24

## 2020-03-24 RX ORDER — MAGNESIUM SULFATE IN WATER 40 MG/ML
2 INJECTION, SOLUTION INTRAVENOUS PRN
Status: CANCELLED | OUTPATIENT
Start: 2020-03-24

## 2020-03-24 RX ORDER — ACETAMINOPHEN 650 MG/1
650 SUPPOSITORY RECTAL EVERY 6 HOURS PRN
Status: CANCELLED | OUTPATIENT
Start: 2020-03-24

## 2020-03-24 RX ORDER — FOLIC ACID 1 MG/1
1 TABLET ORAL DAILY
Status: CANCELLED | OUTPATIENT
Start: 2020-03-24

## 2020-03-24 RX ORDER — POTASSIUM CHLORIDE 7.45 MG/ML
10 INJECTION INTRAVENOUS PRN
Status: CANCELLED | OUTPATIENT
Start: 2020-03-24

## 2020-03-24 RX ORDER — ONDANSETRON 2 MG/ML
4 INJECTION INTRAMUSCULAR; INTRAVENOUS EVERY 6 HOURS PRN
Status: CANCELLED | OUTPATIENT
Start: 2020-03-24

## 2020-03-24 RX ORDER — POTASSIUM CHLORIDE 20 MEQ/1
40 TABLET, EXTENDED RELEASE ORAL PRN
Status: CANCELLED | OUTPATIENT
Start: 2020-03-24

## 2020-03-24 RX ORDER — NICOTINE 21 MG/24HR
1 PATCH, TRANSDERMAL 24 HOURS TRANSDERMAL DAILY
Status: CANCELLED | OUTPATIENT
Start: 2020-03-24

## 2020-03-24 RX ORDER — ACETAMINOPHEN 325 MG/1
650 TABLET ORAL EVERY 6 HOURS PRN
Status: CANCELLED | OUTPATIENT
Start: 2020-03-24

## 2020-03-24 RX ADMIN — FAMOTIDINE 20 MG: 20 TABLET ORAL at 09:42

## 2020-03-24 RX ADMIN — Medication 100 MG: at 09:42

## 2020-03-24 RX ADMIN — FOLIC ACID 1 MG: 1 TABLET ORAL at 09:42

## 2020-03-24 RX ADMIN — THERA TABS 1 TABLET: TAB at 09:42

## 2020-03-24 RX ADMIN — SODIUM CHLORIDE: 9 INJECTION, SOLUTION INTRAVENOUS at 06:04

## 2020-03-24 ASSESSMENT — PAIN SCALES - GENERAL: PAINLEVEL_OUTOF10: 0

## 2020-03-24 NOTE — PROGRESS NOTES
Walked into pt room and he said \"you have to fix these cords. \" When I assessed what cords he was speaking of pt was pulling at his roper catheter. Pt instructed that this was his catheter. The pt stated that he understood and that he would leave it alone. Previous shift nurse reported the same behavior in the pt causing blood in the urine. Catheter removed per Dr. Angeles Arzate. Upon removal, blood was noted around the penis and in the catheter. Pt provided with roper care. Dr. Angeles Arzate made aware. Will continue to assess.     Electronically signed by Selena Cazares RN on 3/24/2020 at 3:32 PM

## 2020-03-24 NOTE — CARE COORDINATION
DOT met with Pt regarding DC planning options. Pt stated he did not need a ride anywhere. SW asked if Pt had somewhere to go. Pt responded with \"nah i'm good\". SW again asked if he needed a ride somewhere and Pt responded with \"nah i'm good\". DOT has updated Pt nurse.  Electronically signed by Ronny Garcia on 3/24/2020 at 2:52 PM

## 2020-03-24 NOTE — DISCHARGE SUMMARY
Colleen Matute  :  1990  MRN:  [id number]    Admit date:  3/23/2020  Discharge date:      Discharging Physician:  Abigail Ruby MD    Advance Directive: Full Code    Consults: psychiatry    Primary Care Physician:  No primary care provider on file. 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 *      Portions of this note have been copied forward, however, changed to reflect the most current clinical status of this patient. Hospital Course:    3/23/2020-Johnnie Chowdhury is a 34year old male with PMH of Bipolar type 1, PTSD, and alcohol/drug abuse. He presented to [address] [place] [address] ED because \"the voices told me to\". Pt expressed suicidal ideation to the ED provider and Psych was consulted. Pt was admitted to medicine to rule out psychosis due to alcohol withdrawal.    3/24/2020- Patient's vitals and labs are stable. Pt did not require Ativan overnight or have any acute events. Patient status is discussed with Dr. Sterling uLnsford. He is stable and ready for discharge to Psych unit on 6th floor of [facility]. Pertinent Labs:  CBC:   Recent Labs     20  1105 20  0230   WBC 9.6 5.5   HGB 13.7* 12.1*    232     BMP:    Recent Labs     20  1105 20  0230    139   K 4.0 4.0   CL 96* 104   CO2 20* 25   BUN 18 15   CREATININE 0.8 0.7   GLUCOSE 103 101     INR:   Recent Labs     20  1228   INR 1.02       Physical Exam:  Vital Signs: /73   Pulse 89   Temp 97.2 °F (36.2 °C) (Temporal)   Resp 18   SpO2 97%   General appearance:. Alert and Cooperative   HEENT: Normocephalic. Chest: clear to auscultation bilaterally without wheezes or rhonchi. Cardiac: Normal heart tones with regular rate and rhythm, S1, S2 normal. No murmurs, gallops, or rubs auscultated.    Abdomen:soft, non-tender; non-distended normal bowel sounds no masses, no organomegaly. Extremities: No clubbing or cyanosis. No peripheral edema. Peripheral pulses palpable. Neurologic: Grossly intact. Discharge Medications: There are no discharge medications for this patient. Discharge Instructions: Follow up with attending provider upon arrival.  Take medications as directed. Resume activity as tolerated. Diet: DIET GENERAL;     Disposition: Patient is medically stable and will be discharged to [facility]. Time spent on discharge 35 minutes spent in assessing patient, reviewing medications, discussion with nursing, confirming safe discharge plan and preparation of discharge summary.     Signed:  Josh Aldrich PA-C   3/24/2020  11:04 AM

## 2020-06-11 ENCOUNTER — HOSPITAL ENCOUNTER (INPATIENT)
Age: 30
LOS: 4 days | Discharge: HOME | DRG: 885 | End: 2020-06-16
Attending: EMERGENCY MEDICINE | Admitting: PSYCHIATRY & NEUROLOGY
Payer: MEDICAID

## 2020-06-11 LAB
ACETAMINOPHEN LEVEL: <15 UG/ML
ALBUMIN SERPL-MCNC: 4.7 G/DL (ref 3.5–5.2)
ALP BLD-CCNC: 70 U/L (ref 40–130)
ALT SERPL-CCNC: 7 U/L (ref 5–41)
AMPHETAMINE SCREEN, URINE: NEGATIVE
ANION GAP SERPL CALCULATED.3IONS-SCNC: 11 MMOL/L (ref 7–19)
AST SERPL-CCNC: 20 U/L (ref 5–40)
BARBITURATE SCREEN URINE: NEGATIVE
BASOPHILS ABSOLUTE: 0.1 K/UL (ref 0–0.2)
BASOPHILS RELATIVE PERCENT: 1.1 % (ref 0–1)
BENZODIAZEPINE SCREEN, URINE: NEGATIVE
BILIRUB SERPL-MCNC: <0.2 MG/DL (ref 0.2–1.2)
BILIRUBIN URINE: NEGATIVE
BLOOD, URINE: NEGATIVE
BUN BLDV-MCNC: 9 MG/DL (ref 6–20)
CALCIUM SERPL-MCNC: 8.7 MG/DL (ref 8.6–10)
CANNABINOID SCREEN URINE: NEGATIVE
CHLORIDE BLD-SCNC: 102 MMOL/L (ref 98–111)
CLARITY: CLEAR
CO2: 26 MMOL/L (ref 22–29)
COCAINE METABOLITE SCREEN URINE: NEGATIVE
COLOR: YELLOW
CREAT SERPL-MCNC: 0.7 MG/DL (ref 0.5–1.2)
EOSINOPHILS ABSOLUTE: 0.1 K/UL (ref 0–0.6)
EOSINOPHILS RELATIVE PERCENT: 1.6 % (ref 0–5)
ETHANOL: 242 MG/DL (ref 0–0.08)
GFR NON-AFRICAN AMERICAN: >60
GLUCOSE BLD-MCNC: 103 MG/DL (ref 74–109)
GLUCOSE URINE: NEGATIVE MG/DL
HCT VFR BLD CALC: 47.4 % (ref 42–52)
HEMOGLOBIN: 16.1 G/DL (ref 14–18)
IMMATURE GRANULOCYTES #: 0 K/UL
KETONES, URINE: NEGATIVE MG/DL
LEUKOCYTE ESTERASE, URINE: NEGATIVE
LYMPHOCYTES ABSOLUTE: 3.6 K/UL (ref 1.1–4.5)
LYMPHOCYTES RELATIVE PERCENT: 48.9 % (ref 20–40)
Lab: NORMAL
MCH RBC QN AUTO: 31.1 PG (ref 27–31)
MCHC RBC AUTO-ENTMCNC: 34 G/DL (ref 33–37)
MCV RBC AUTO: 91.5 FL (ref 80–94)
MONOCYTES ABSOLUTE: 0.3 K/UL (ref 0–0.9)
MONOCYTES RELATIVE PERCENT: 3.8 % (ref 0–10)
NEUTROPHILS ABSOLUTE: 3.3 K/UL (ref 1.5–7.5)
NEUTROPHILS RELATIVE PERCENT: 44.5 % (ref 50–65)
NITRITE, URINE: NEGATIVE
OPIATE SCREEN URINE: NEGATIVE
PDW BLD-RTO: 12.6 % (ref 11.5–14.5)
PH UA: 6.5 (ref 5–8)
PLATELET # BLD: 299 K/UL (ref 130–400)
PMV BLD AUTO: 9.6 FL (ref 9.4–12.4)
POTASSIUM SERPL-SCNC: 3.9 MMOL/L (ref 3.5–5)
PROTEIN UA: NEGATIVE MG/DL
RBC # BLD: 5.18 M/UL (ref 4.7–6.1)
SALICYLATE, SERUM: <3 MG/DL (ref 3–10)
SODIUM BLD-SCNC: 139 MMOL/L (ref 136–145)
SPECIFIC GRAVITY UA: 1.01 (ref 1–1.03)
TOTAL PROTEIN: 7.6 G/DL (ref 6.6–8.7)
UROBILINOGEN, URINE: 0.2 E.U./DL
WBC # BLD: 7.4 K/UL (ref 4.8–10.8)

## 2020-06-11 PROCEDURE — 6370000000 HC RX 637 (ALT 250 FOR IP): Performed by: EMERGENCY MEDICINE

## 2020-06-11 PROCEDURE — G0480 DRUG TEST DEF 1-7 CLASSES: HCPCS

## 2020-06-11 PROCEDURE — 80307 DRUG TEST PRSMV CHEM ANLYZR: CPT

## 2020-06-11 PROCEDURE — 81003 URINALYSIS AUTO W/O SCOPE: CPT

## 2020-06-11 PROCEDURE — 36415 COLL VENOUS BLD VENIPUNCTURE: CPT

## 2020-06-11 PROCEDURE — 99285 EMERGENCY DEPT VISIT HI MDM: CPT

## 2020-06-11 PROCEDURE — 82607 VITAMIN B-12: CPT

## 2020-06-11 PROCEDURE — 85025 COMPLETE CBC W/AUTO DIFF WBC: CPT

## 2020-06-11 PROCEDURE — 80053 COMPREHEN METABOLIC PANEL: CPT

## 2020-06-11 RX ORDER — THIAMINE MONONITRATE (VIT B1) 100 MG
100 TABLET ORAL ONCE
Status: COMPLETED | OUTPATIENT
Start: 2020-06-11 | End: 2020-06-11

## 2020-06-11 RX ADMIN — Medication 100 MG: at 21:17

## 2020-06-12 PROBLEM — F15.90 STIMULANT USE DISORDER: Status: ACTIVE | Noted: 2020-06-12

## 2020-06-12 PROBLEM — R45.851 SUICIDAL IDEATION: Status: ACTIVE | Noted: 2020-06-12

## 2020-06-12 PROBLEM — F31.32 BIPOLAR AFFECTIVE DISORDER, CURRENTLY DEPRESSED, MODERATE (HCC): Status: ACTIVE | Noted: 2020-06-12

## 2020-06-12 PROBLEM — F10.20 ALCOHOL USE DISORDER, SEVERE, DEPENDENCE (HCC): Status: ACTIVE | Noted: 2020-06-12

## 2020-06-12 LAB — ETHANOL: 11 MG/DL (ref 0–0.08)

## 2020-06-12 PROCEDURE — 6370000000 HC RX 637 (ALT 250 FOR IP): Performed by: NURSE PRACTITIONER

## 2020-06-12 PROCEDURE — 1240000000 HC EMOTIONAL WELLNESS R&B

## 2020-06-12 PROCEDURE — 82306 VITAMIN D 25 HYDROXY: CPT

## 2020-06-12 PROCEDURE — G0480 DRUG TEST DEF 1-7 CLASSES: HCPCS

## 2020-06-12 PROCEDURE — 84443 ASSAY THYROID STIM HORMONE: CPT

## 2020-06-12 PROCEDURE — 90792 PSYCH DIAG EVAL W/MED SRVCS: CPT | Performed by: NURSE PRACTITIONER

## 2020-06-12 PROCEDURE — 6370000000 HC RX 637 (ALT 250 FOR IP): Performed by: PSYCHIATRY & NEUROLOGY

## 2020-06-12 PROCEDURE — 36415 COLL VENOUS BLD VENIPUNCTURE: CPT

## 2020-06-12 RX ORDER — IBUPROFEN 600 MG/1
600 TABLET ORAL EVERY 6 HOURS PRN
Status: DISCONTINUED | OUTPATIENT
Start: 2020-06-12 | End: 2020-06-16 | Stop reason: HOSPADM

## 2020-06-12 RX ORDER — LORAZEPAM 1 MG/1
1 TABLET ORAL EVERY 4 HOURS PRN
Status: DISCONTINUED | OUTPATIENT
Start: 2020-06-12 | End: 2020-06-16 | Stop reason: HOSPADM

## 2020-06-12 RX ORDER — PRAZOSIN HYDROCHLORIDE 1 MG/1
1 CAPSULE ORAL NIGHTLY
Status: DISCONTINUED | OUTPATIENT
Start: 2020-06-12 | End: 2020-06-16 | Stop reason: HOSPADM

## 2020-06-12 RX ORDER — LORAZEPAM 1 MG/1
2 TABLET ORAL EVERY 4 HOURS PRN
Status: DISCONTINUED | OUTPATIENT
Start: 2020-06-12 | End: 2020-06-16 | Stop reason: HOSPADM

## 2020-06-12 RX ORDER — THIAMINE MONONITRATE (VIT B1) 100 MG
100 TABLET ORAL DAILY
Status: DISCONTINUED | OUTPATIENT
Start: 2020-06-12 | End: 2020-06-16 | Stop reason: HOSPADM

## 2020-06-12 RX ORDER — DIVALPROEX SODIUM 500 MG/1
500 TABLET, DELAYED RELEASE ORAL EVERY 12 HOURS SCHEDULED
Status: DISCONTINUED | OUTPATIENT
Start: 2020-06-12 | End: 2020-06-16 | Stop reason: HOSPADM

## 2020-06-12 RX ORDER — POLYETHYLENE GLYCOL 3350 17 G/17G
17 POWDER, FOR SOLUTION ORAL DAILY PRN
Status: DISCONTINUED | OUTPATIENT
Start: 2020-06-12 | End: 2020-06-16 | Stop reason: HOSPADM

## 2020-06-12 RX ORDER — FOLIC ACID 1 MG/1
1 TABLET ORAL DAILY
Status: DISCONTINUED | OUTPATIENT
Start: 2020-06-12 | End: 2020-06-16 | Stop reason: HOSPADM

## 2020-06-12 RX ORDER — NICOTINE 21 MG/24HR
1 PATCH, TRANSDERMAL 24 HOURS TRANSDERMAL DAILY
Status: DISCONTINUED | OUTPATIENT
Start: 2020-06-12 | End: 2020-06-16 | Stop reason: HOSPADM

## 2020-06-12 RX ORDER — CITALOPRAM 20 MG/1
20 TABLET ORAL DAILY
Status: DISCONTINUED | OUTPATIENT
Start: 2020-06-12 | End: 2020-06-16 | Stop reason: HOSPADM

## 2020-06-12 RX ORDER — ACETAMINOPHEN 325 MG/1
650 TABLET ORAL EVERY 4 HOURS PRN
Status: DISCONTINUED | OUTPATIENT
Start: 2020-06-12 | End: 2020-06-16 | Stop reason: HOSPADM

## 2020-06-12 RX ORDER — HYDROXYZINE HYDROCHLORIDE 25 MG/1
25 TABLET, FILM COATED ORAL 4 TIMES DAILY PRN
Status: DISCONTINUED | OUTPATIENT
Start: 2020-06-12 | End: 2020-06-16 | Stop reason: HOSPADM

## 2020-06-12 RX ADMIN — HYDROXYZINE HYDROCHLORIDE 25 MG: 25 TABLET, FILM COATED ORAL at 17:54

## 2020-06-12 RX ADMIN — PRAZOSIN HYDROCHLORIDE 1 MG: 1 CAPSULE ORAL at 20:42

## 2020-06-12 RX ADMIN — DIVALPROEX SODIUM 500 MG: 500 TABLET, DELAYED RELEASE ORAL at 21:02

## 2020-06-12 RX ADMIN — Medication 100 MG: at 10:04

## 2020-06-12 RX ADMIN — DIVALPROEX SODIUM 500 MG: 500 TABLET, DELAYED RELEASE ORAL at 10:04

## 2020-06-12 RX ADMIN — CITALOPRAM HYDROBROMIDE 20 MG: 20 TABLET ORAL at 10:04

## 2020-06-12 RX ADMIN — FOLIC ACID 1 MG: 1 TABLET ORAL at 10:04

## 2020-06-12 RX ADMIN — LORAZEPAM 1 MG: 1 TABLET ORAL at 21:01

## 2020-06-12 RX ADMIN — LORAZEPAM 1 MG: 1 TABLET ORAL at 10:02

## 2020-06-12 ASSESSMENT — SLEEP AND FATIGUE QUESTIONNAIRES
DO YOU HAVE DIFFICULTY SLEEPING: NO
AVERAGE NUMBER OF SLEEP HOURS: 14
DO YOU USE A SLEEP AID: NO

## 2020-06-12 ASSESSMENT — ENCOUNTER SYMPTOMS
DIARRHEA: 0
EYE PAIN: 0
SHORTNESS OF BREATH: 0
VOMITING: 0
ABDOMINAL PAIN: 0

## 2020-06-12 ASSESSMENT — PATIENT HEALTH QUESTIONNAIRE - PHQ9: SUM OF ALL RESPONSES TO PHQ QUESTIONS 1-9: 27

## 2020-06-12 ASSESSMENT — LIFESTYLE VARIABLES: HISTORY_ALCOHOL_USE: YES

## 2020-06-12 ASSESSMENT — PAIN SCALES - GENERAL: PAINLEVEL_OUTOF10: 0

## 2020-06-12 NOTE — PLAN OF CARE
Problem: Health Behavior:  Goal: Ability to verbalize adaptive coping strategies to use when suicidal feelings occur will improve  Outcome: Ongoing      Group Therapy Note     Date: 6/12/2020  Start Time: 1000  End Time:  6719  Number of Participants: 7     Type of Group: Psychotherapy     Wellness Binder Information  Module Name:  emotional wellness  Session Number:  5     Patient's Goal:  obstacles to emotional wellness     Notes:  pt acknowledged negative thinking as an obstacle to emotional wellness.      Status After Intervention:  Improved     Participation Level: Interactive     Participation Quality: Appropriate, Attentive, and Sharing        Speech:  normal        Thought Process/Content: Logical        Affective Functioning: Congruent        Mood: congruent        Level of consciousness:  Alert, Oriented x4, and Attentive        Response to Learning: Able to verbalize current knowledge/experience        Endings: None Reported     Modes of Intervention: Education        Discipline Responsible: Psychoeducational Specialist        Signature:  Ignacio Hoffman

## 2020-06-12 NOTE — PROGRESS NOTES
BHI Daily Shift Assessment  Nursing Progress Note    Room: [facility]/606-01 Name: Zeb Li Age: 34 y.o.    Ethnicity:  Gender: male   Dx: Bipolar affective disorder, currently depressed, moderate ([address]  Precautions: suicide risk  CPAP: No Accu-Chek: No  MSE:  Status and Exam  Normal: No  Facial Expression: Flat  Affect: Congruent  Level of Consciousness: Alert  Mood:Normal: No  Mood: Depressed, Anxious, Sad  Motor Activity:Normal: Yes  Interview Behavior: Cooperative  Preception: [place] to Situation, [place] to Place, [place] to Time, [place] to Person  Attention:Normal: Yes  Thought Processes: Circumstantial  Thought Content:Normal: Yes  Hallucinations: None  Delusions: No  Memory:Normal: Yes  Insight and Judgment: No  Insight and Judgment: Poor Insight, Poor Judgment  Present Suicidal Ideation: No  Present Homicidal Ideation: No  Sleep: No, Poor, has difficulty falling asleep Hours Slept: 2  Other PRN Meds: No Med Compliant: Yes Appetite: good Percent Meals: 100% Social: No ADLs: Yes Speech: normal Depression: 7 Anxiety: 7    Jimenez Kwon RN

## 2020-06-12 NOTE — PLAN OF CARE
Problem: Safety:  Goal: Ability to contract for his/her safety will improve  6/12/2020 1552 by Adele Chiu   Group Therapy Note     Date: 6/12/2020  Start Time: 1430  End Time:  1441  Number of Participants: 3     Type of Group: Cognitive Skills     Wellness Binder Information  Module Name:  emotional wellness  Session Number:  3     Patient's Goal:  validation of feelings     Notes:  pt acknowledged to have feelings validated it may be necessary to share feelings with others.      Status After Intervention:  Improved     Participation Level: Interactive     Participation Quality: Appropriate, Attentive, and Sharing        Speech:  normal        Thought Process/Content: Logical        Affective Functioning: Congruent        Mood: congruent        Level of consciousness:  Alert, Oriented x4, and Attentive        Response to Learning: Able to verbalize current knowledge/experience        Endings: None Reported     Modes of Intervention: Education        Discipline Responsible: Psychoeducational Specialist        Signature:  Adele Chiu                 Outcome: Ongoing

## 2020-06-12 NOTE — PLAN OF CARE
Problem: Health Behavior:  Goal: Ability to verbalize adaptive coping strategies to use when suicidal feelings occur will improve  6/12/2020 1602 by Bertin Neri  Outcome: Ongoing       Group Therapy Note     Date: 6/12/2020  Start Time: 8530  End Time:  1600  Number of Participants: 3     Type of Group: Recovery     Wellness Binder Information  Module Name:  staying well  Session Number:  1     Patient's Goal:  daily maintenance and coping skills     Notes:  pt acknowledged use of positive coping skills daily to help stay well.      Status After Intervention:  Improved     Participation Level: Interactive     Participation Quality: Appropriate, Attentive, and Sharing        Speech:  normal        Thought Process/Content: Logical        Affective Functioning: Congruent        Mood: congruent        Level of consciousness:  Alert, Oriented x4, and Attentive        Response to Learning: Able to verbalize current knowledge/experience        Endings: None Reported     Modes of Intervention: Education        Discipline Responsible: Psychoeducational Specialist        Signature:  Bertin Neri

## 2020-06-12 NOTE — ED NOTES
ASSESSMENT: pt here states he is homeless and wants to hang himself    PT ALERT/ORIENTED X4. PUPILS EQUAL/REACTIVE    SKIN:  WARM/DRY PINK CAPILLARY REFILL < 2SECS    CARDIAC:  S1 S2 NOTED     LUNGS: CLEAR UPPER AND LOWER LOBES, RESPIRATIONS EVEN/UNLABORED     ABDOMEN: BOWEL SOUNDS NOTED UPPER AND LOWER QUADRANTS                     SOFT AND NONTENDER. EXTREMITIES:  BILATERAL DP AND PT AND NO EDEMA NOTED. NO DISTRESS NOTED. SIDE RAILS UP AND CALL LIGHT IN REACH.      Robb Chirinos RN  06/11/20 2009

## 2020-06-12 NOTE — GROUP NOTE
Group Therapy Note    Date: 6/12/2020    Group Start Time: 1600  Group End Time: 1700  Group Topic: Healthy Living/Wellness    [facility]    Jax Parker RN                                                               Group Therapy Note    Date: 3/10/2020  Start Time: 1600  End Time:  1700  Number of Participants: 5    Type of Group: Healthy Living/Wellness         Patient's Goal:  medication education       Status After Intervention:  Improved    Participation Level: Active Listener    Participation Quality: Appropriate      Speech:  normal      Thought Process/Content: Logical      Affective Functioning: Congruent      Mood: anxious      Level of consciousness:  Alert      Response to Learning: Able to verbalize current knowledge/experience      Endings: None Reported    Modes of Intervention: Education      Discipline Responsible: Registered Nurse      Signature:   Jax Parker RN

## 2020-06-12 NOTE — PROGRESS NOTES
Admission Note      Reason for admission/Target Symptom: Patient admitted to [facility] due to   Diagnoses: depression, suicidal ideation  UDS: neg  BAL: neg     SW met with treatment team to discuss patient's treatment including care planning, discharge planning, and follow-up needs. Pt has been admitted to [facility]. Treatment team has identified patient's discharge needs as medication management and outpatient therapy/counseling. Pt confirmed  the need for ongoing treatment post inpatient stay. Pt was also provided a handout of contact information for drug and alcohol treatment centers and other community support service such as [facility], AA, and [facility].

## 2020-06-12 NOTE — PLAN OF CARE
Problem: Discharge Planning:  Goal: Discharged to appropriate level of care  Description: Discharged to appropriate level of care  Outcome: Ongoing     Problem: Nutrition Deficit:  Goal: Ability to achieve adequate nutritional intake will improve  Description: Ability to achieve adequate nutritional intake will improve  Outcome: Ongoing     Problem: Sleep Pattern Disturbance:  Goal: Appears well-rested  Description: Appears well-rested  Outcome: Ongoing     Problem: Falls - Risk of:  Goal: Will remain free from falls  Description: Will remain free from falls  Outcome: Ongoing  Goal: Absence of physical injury  Description: Absence of physical injury  Outcome: Ongoing     Problem: Health Behavior:  Goal: Ability to verbalize adaptive coping strategies to use when suicidal feelings occur will improve  Description: Ability to verbalize adaptive coping strategies to use when suicidal feelings occur will improve  6/12/2020 1518 by Rocky Oviedo RN  Outcome: Ongoing  6/12/2020 1138 by Thea Blake  Outcome: Ongoing  Goal: Ability to verbalize adaptive coping strategies to use when the urge to self-mutilate occurs will improve  Description: Ability to verbalize adaptive coping strategies to use when the urge to self-mutilate occurs will improve  Outcome: Ongoing     Problem: Safety:  Goal: Ability to contract for his/her safety will improve  Description: Ability to contract for his/her safety will improve  Outcome: Ongoing
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
## 2020-06-12 NOTE — PROGRESS NOTES
Requirement Note     SW met with pt to complete Psychosocial and CSSR-S on this date. Patients long and short term goals discussed. Pt voiced understanding. Treatment plan sheet signed. Pt verbalized understanding of the treatment plan. Pt participated in goals and objectives of the treatment plan. Pt completed safety plan with , pt received copy of plan, and original was placed into pt's chart. SW explained treatment goals with pt. Decreasing depression and anxiety by improvement of positive coping patterns was discussed. Pt acknowledged understanding of treatment goals and signed treatment plan signature sheet. In the last 6 months has the pt been a danger to self: YES  In the last 6 months has the pt been a danger to others: NO  Legal Guardian/POA: NO     Provided pt with [employer] Online handout entitled \"Quitting Smoking. \"  Reviewed handout with pt addressing dangers of smoking, developing coping skills, and providing basic information about quitting. Patient refused/declined practical counseling of tobacco practical counseling during the hospital stay.

## 2020-06-12 NOTE — PROGRESS NOTES
Treatment Team Note:    LCSW met with [address] [address] team to discuss Pts [address] plans. Progress/Behavior/Group Attendance: yes    Target Symptoms/Reason for admission: depression, suicidal ideation    Diagnoses: depression, suicidal ideation    UDS: Neg     BAL: Neg    AftercarePlan: [address]    Pt lives with: SW will meet with pt to gather information. Collateral obtained from: SW will meet with pt to gather release of information.   On:    Family Session: JOSE MIGUEL    Misc:

## 2020-06-13 LAB
TSH REFLEX FT4: 1.8 UIU/ML (ref 0.35–5.5)
VITAMIN B-12: 312 PG/ML (ref 211–946)
VITAMIN D 25-HYDROXY: 20.8 NG/ML

## 2020-06-13 PROCEDURE — 6370000000 HC RX 637 (ALT 250 FOR IP): Performed by: FAMILY MEDICINE

## 2020-06-13 PROCEDURE — 99231 SBSQ HOSP IP/OBS SF/LOW 25: CPT | Performed by: PSYCHIATRY & NEUROLOGY

## 2020-06-13 PROCEDURE — 6370000000 HC RX 637 (ALT 250 FOR IP): Performed by: PSYCHIATRY & NEUROLOGY

## 2020-06-13 PROCEDURE — 6370000000 HC RX 637 (ALT 250 FOR IP): Performed by: NURSE PRACTITIONER

## 2020-06-13 PROCEDURE — 1240000000 HC EMOTIONAL WELLNESS R&B

## 2020-06-13 RX ORDER — ERGOCALCIFEROL 1.25 MG/1
50000 CAPSULE ORAL WEEKLY
Status: DISCONTINUED | OUTPATIENT
Start: 2020-06-13 | End: 2020-06-16 | Stop reason: HOSPADM

## 2020-06-13 RX ORDER — CHOLECALCIFEROL (VITAMIN D3) 125 MCG
500 CAPSULE ORAL DAILY
Status: DISCONTINUED | OUTPATIENT
Start: 2020-06-13 | End: 2020-06-16 | Stop reason: HOSPADM

## 2020-06-13 RX ADMIN — CYANOCOBALAMIN TAB 500 MCG 500 MCG: 500 TAB at 15:16

## 2020-06-13 RX ADMIN — PRAZOSIN HYDROCHLORIDE 1 MG: 1 CAPSULE ORAL at 20:45

## 2020-06-13 RX ADMIN — LORAZEPAM 1 MG: 1 TABLET ORAL at 12:33

## 2020-06-13 RX ADMIN — HYDROXYZINE HYDROCHLORIDE 25 MG: 25 TABLET, FILM COATED ORAL at 19:45

## 2020-06-13 RX ADMIN — LORAZEPAM 1 MG: 1 TABLET ORAL at 21:13

## 2020-06-13 RX ADMIN — ERGOCALCIFEROL 50000 UNITS: 1.25 CAPSULE ORAL at 15:16

## 2020-06-13 RX ADMIN — HYDROXYZINE HYDROCHLORIDE 25 MG: 25 TABLET, FILM COATED ORAL at 11:00

## 2020-06-13 RX ADMIN — DIVALPROEX SODIUM 500 MG: 500 TABLET, DELAYED RELEASE ORAL at 20:45

## 2020-06-13 RX ADMIN — DIVALPROEX SODIUM 500 MG: 500 TABLET, DELAYED RELEASE ORAL at 08:38

## 2020-06-13 RX ADMIN — FOLIC ACID 1 MG: 1 TABLET ORAL at 08:38

## 2020-06-13 RX ADMIN — CITALOPRAM HYDROBROMIDE 20 MG: 20 TABLET ORAL at 08:38

## 2020-06-13 RX ADMIN — Medication 100 MG: at 08:38

## 2020-06-13 NOTE — H&P
HISTORY and PHYSICAL      CHIEF COMPLAINT:  Depression, SI    Reason for Admission:  Depression, SI    History Obtained From:  Patient, chart    HISTORY OF PRESENT ILLNESS:      The patient is a 34 y.o. male who is admitted to the [place] [address] unit with worsening mood issues. He has no c/o CP or SOA. No abdominal pain or N/V. No dysuria. No weakness or HA. He has mouth/tooth pain at times. No fevers. Past Medical History:        Diagnosis Date    Alcohol abuse     Bipolar 1 disorder ([address]     Post traumatic stress disorder (PTSD)      Past Surgical History:        Procedure Laterality Date    OTHER SURGICAL HISTORY      cut tendon in right little finger         Medications Prior to Admission:    No medications prior to admission. Allergies:  Patient has no known allergies. Social History:   TOBACCO:   reports that he has been smoking. He has been smoking about 1.00 pack per day. He has never used smokeless tobacco.  ETOH:   reports current alcohol use. DRUGS:   reports previous drug use. Drugs: Marijuana and Methamphetamines. MARITAL STATUS:  Not   OCCUPATION:  Not working  Patient currently lives with a Friend      Family History:       Problem Relation Age of Onset    Other Father      REVIEW OF SYSTEMS:  Constitutional: neg  CV: neg  Pulmonary: neg  GI: neg  : neg  Psych: depression, SI  Neuro: neg  Skin: neg  MusculoSkeletal: neg  HEENT: tooth pain  Joints: neg    Vitals:  /89   Pulse 72   Temp 98 °F (36.7 °C) (Temporal)   Resp 18   Ht 5' 11\" (1.803 m)   Wt 134 lb (60.8 kg)   SpO2 99%   BMI 18.69 kg/m²     PHYSICAL EXAM:  Gen: NAD, alert  HEENT: WNL  Lymph: no LAD  Neck: no JVD or masses  Chest: CTA bilat  CV: RRR  Abdomen: NT/ND  Extrem: no C/C/E  Neuro: non focal  Skin: no rashes  Joints: no redness    DATA:  I have reviewed the admission labs and imaging tests.     ASSESSMENT AND PLAN:      Principal Problem:    Bipolar affective disorder, currently depressed, moderate, SI---follow with Psych    Post traumatic stress disorder (PTSD)    Polysubstance Abuse      Marilee Chávez MD  8:13 PM 6/12/2020

## 2020-06-13 NOTE — PROGRESS NOTES
Group Therapy Note    Start Time: 800  End Time:  570  Number of Participants: 6    Type of Group: Community Meeting       Patient's Goal:  \"figuring out why I feel this way\"      Notes:      Participation Level:  Active Listener       Participation Quality: Appropriate      Thought Process/Content: Logical      Affective Functioning: Congruent      Mood: calm      Level of consciousness:  Alert      Modes of Intervention: Support      Discipline Responsible: Behavioral Health Tech II      Signature:  Bucky Stevens

## 2020-06-13 NOTE — PLAN OF CARE
Problem: Discharge Planning:  Goal: Discharged to appropriate level of care  Description: Discharged to appropriate level of care  Outcome: Ongoing     Problem: Nutrition Deficit:  Goal: Ability to achieve adequate nutritional intake will improve  Description: Ability to achieve adequate nutritional intake will improve  Outcome: Ongoing     Problem: Sleep Pattern Disturbance:  Goal: Appears well-rested  Description: Appears well-rested  Outcome: Ongoing     Problem: Falls - Risk of:  Goal: Will remain free from falls  Description: Will remain free from falls  Outcome: Ongoing  Goal: Absence of physical injury  Description: Absence of physical injury  Outcome: Ongoing     Problem: Health Behavior:  Goal: Ability to verbalize adaptive coping strategies to use when suicidal feelings occur will improve  Description: Ability to verbalize adaptive coping strategies to use when suicidal feelings occur will improve  Outcome: Ongoing  Goal: Ability to verbalize adaptive coping strategies to use when the urge to self-mutilate occurs will improve  Description: Ability to verbalize adaptive coping strategies to use when the urge to self-mutilate occurs will improve  Outcome: Ongoing     Problem: Safety:  Goal: Ability to contract for his/her safety will improve  Description: Ability to contract for his/her safety will improve  Outcome: Ongoing

## 2020-06-13 NOTE — PROGRESS NOTES
[facility] Adult Unit Daily Assessment  Nursing Progress Note    [place] [facility] [place] [place]/[address]   Name: Nghia Benavides   Age: 34 y.o. Gender: male   Dx: Bipolar affective disorder, currently depressed, moderate (HCC)  Precautions: suicide risk  Inpatient Status: voluntary       SLEEP:    Sleep Quality Good  Sleep Medications: Yes, prazosin   PRN Sleep Meds: No       MEDICAL:    Other PRN Meds: No   Med Compliant: Yes  Accu-Chek: No  Oxygen/CPAP/BiPAP: No  CIWA/CINA: No   PAIN Assessment: none        PSYCH:    Depression: 7   Anxiety: 9   SI denies suicidal ideation   HI Negative for homicidal ideation      AVH:Absent      GENERAL:    Appetite: good    Social: Yes   Speech: normal   Appearance: appropriately dressed and healthy looking    Notes: Patient stated that he drinks 9 to 10 beers daily to relaxes. Patient reported that he enjoyed drinking. Patient stated that the groups sessions was helpful. Patient spent most of his evening in his room. Patient was calm and cooperative with staff and peers. Will continue to monitor.          Electronically signed by Shantel Johansen RN on 6/12/20 at 10:07 PM CDT

## 2020-06-13 NOTE — PROGRESS NOTES
Progress Note  Rossana Sheffield  6/13/2020 11:01 AM  Subjective:   Admit Date:   6/11/2020      CC/ADMIT DX:       Interval History:   Reviewed overnight events and nursing notes. No new physical complaints. I have reviewed all labs/diagnostics from the last 24hrs. ROS:   I have done a 10 point ROS and all are negative, except what is mentioned in the HPI. DIET GENERAL;    Medications:      vitamin D  50,000 Units Oral Weekly    vitamin B-12  500 mcg Oral Daily    prazosin  1 mg Oral Nightly    divalproex  500 mg Oral 2 times per day    citalopram  20 mg Oral Daily    thiamine  100 mg Oral Daily    folic acid  1 mg Oral Daily    nicotine  1 patch Transdermal Daily           Objective:   Vitals: BP (!) 140/86   Pulse 67   Temp 97.5 °F (36.4 °C) (Temporal)   Resp 20   Ht 5' 11\" (1.803 m)   Wt 134 lb (60.8 kg)   SpO2 99%   BMI 18.69 kg/m²  No intake or output data in the 24 hours ending 06/13/20 1101  General appearance: alert and cooperative with exam  Extremities: extremities normal, atraumatic, no cyanosis or edema  Neurologic:  No obvious focal neurologic deficits. Skin: no rashes    Assessment and Plan:   Principal Problem:    Bipolar affective disorder, currently depressed, moderate (HCC)  Active Problems:    Post traumatic stress disorder (PTSD)    Suicidal ideation    Alcohol use disorder, severe, dependence (HCC)    Stimulant use disorder    Tobacco use disorder  Resolved Problems:    Bipolar 1 disorder (HCC)    Vit D Def    Plan:  1. Continue present medication(s)   2. Replace Vit D  3. Follow with Psych      Discharge planning:   home     Reviewed treatment plans with the patient and/or family.              Electronically signed by Neil Ma MD on 6/13/2020 at 11:01 AM

## 2020-06-13 NOTE — PROGRESS NOTES
Improving. Judgment: Improving.     Data  Lab Results   Component Value Date    WBC 7.4 06/11/2020    HGB 16.1 06/11/2020    HCT 47.4 06/11/2020    MCV 91.5 06/11/2020     06/11/2020      Lab Results   Component Value Date     06/11/2020    K 3.9 06/11/2020     06/11/2020    CO2 26 06/11/2020    BUN 9 06/11/2020    CREATININE 0.7 06/11/2020    GLUCOSE 103 06/11/2020    CALCIUM 8.7 06/11/2020    PROT 7.6 06/11/2020    LABALBU 4.7 06/11/2020    BILITOT <0.2 06/11/2020    ALKPHOS 70 06/11/2020    AST 20 06/11/2020    ALT 7 06/11/2020    LABGLOM >60 06/11/2020    GLOB 3.0 02/03/2016       Medications    Current Facility-Administered Medications:     vitamin D (ERGOCALCIFEROL) capsule 50,000 Units, 50,000 Units, Oral, Weekly, Nemesio Altamirano MD    vitamin B-12 (CYANOCOBALAMIN) tablet 500 mcg, 500 mcg, Oral, Daily, Nemesio Altamirano MD    acetaminophen (TYLENOL) tablet 650 mg, 650 mg, Oral, Q4H PRN, Mart Damon MD    polyethylene glycol [facility]) packet 17 g, 17 g, Oral, Daily PRN, Mart Damon MD    LORazepam (ATIVAN) tablet 1 mg, 1 mg, Oral, Q4H PRN, 1 mg at 06/12/20 2101 **OR** LORazepam (ATIVAN) tablet 2 mg, 2 mg, Oral, Q4H PRN, Mart Damon MD    prazosin (MINIPRESS) capsule 1 mg, 1 mg, Oral, Nightly, TYRESE Ferrell, 1 mg at 06/12/20 2042    divalproex (DEPAKOTE) DR tablet 500 mg, 500 mg, Oral, 2 times per day, TYRESE Ferrell, 500 mg at 06/13/20 8183    citalopram (CELEXA) tablet 20 mg, 20 mg, Oral, Daily, TYRESE Ferrell, 20 mg at 06/13/20 1433    vitamin B-1 (THIAMINE) tablet 100 mg, 100 mg, Oral, Daily, TYRESE Ferrell, 100 mg at 55/38/97 3527    folic acid (FOLVITE) tablet 1 mg, 1 mg, Oral, Daily, TYRESE Ferrell, 1 mg at 06/13/20 0136    hydrOXYzine (ATARAX) tablet 25 mg, 25 mg, Oral, 4x Daily PRN, TYRESE Ferrell, 25 mg at 06/13/20 1100    ibuprofen (ADVIL;MOTRIN) tablet 600 mg, 600 mg, Oral, Q6H PRN, Sumeet DELAROSA

## 2020-06-14 PROCEDURE — 1240000000 HC EMOTIONAL WELLNESS R&B

## 2020-06-14 PROCEDURE — 6370000000 HC RX 637 (ALT 250 FOR IP): Performed by: PSYCHIATRY & NEUROLOGY

## 2020-06-14 PROCEDURE — 6370000000 HC RX 637 (ALT 250 FOR IP): Performed by: NURSE PRACTITIONER

## 2020-06-14 PROCEDURE — 6370000000 HC RX 637 (ALT 250 FOR IP): Performed by: FAMILY MEDICINE

## 2020-06-14 RX ORDER — ONDANSETRON 4 MG/1
4 TABLET, FILM COATED ORAL EVERY 6 HOURS PRN
Status: DISCONTINUED | OUTPATIENT
Start: 2020-06-14 | End: 2020-06-16 | Stop reason: HOSPADM

## 2020-06-14 RX ADMIN — LORAZEPAM 1 MG: 1 TABLET ORAL at 20:25

## 2020-06-14 RX ADMIN — CITALOPRAM HYDROBROMIDE 20 MG: 20 TABLET ORAL at 08:14

## 2020-06-14 RX ADMIN — PRAZOSIN HYDROCHLORIDE 1 MG: 1 CAPSULE ORAL at 20:25

## 2020-06-14 RX ADMIN — FOLIC ACID 1 MG: 1 TABLET ORAL at 08:15

## 2020-06-14 RX ADMIN — HYDROXYZINE HYDROCHLORIDE 25 MG: 25 TABLET, FILM COATED ORAL at 12:17

## 2020-06-14 RX ADMIN — Medication 100 MG: at 08:15

## 2020-06-14 RX ADMIN — DIVALPROEX SODIUM 500 MG: 500 TABLET, DELAYED RELEASE ORAL at 20:25

## 2020-06-14 RX ADMIN — DIVALPROEX SODIUM 500 MG: 500 TABLET, DELAYED RELEASE ORAL at 08:14

## 2020-06-14 RX ADMIN — ONDANSETRON HYDROCHLORIDE 4 MG: 4 TABLET, FILM COATED ORAL at 17:25

## 2020-06-14 RX ADMIN — CYANOCOBALAMIN TAB 500 MCG 500 MCG: 500 TAB at 08:15

## 2020-06-14 ASSESSMENT — PAIN SCALES - GENERAL: PAINLEVEL_OUTOF10: 0

## 2020-06-14 NOTE — PROGRESS NOTES
Group Therapy Note    Date: 6/13/2020  Start Time: 2000  End Time:  2030  Number of Participants: 6    Type of Group: Wrap-Up    Wellness Binder Information  Module Name:    Session Number:      Patient's Goal:  See self inventory    Notes:  Pt filled out  wrap up sheet    Status After Intervention:  Unchanged    Participation Level: Minimal    Participation Quality: Appropriate      Speech:  mute      Thought Process/Content: Perseverating      Affective Functioning: Flat      Mood: anxious and depressed      Level of consciousness:  Drowsy      Response to Learning: Able to retain information and Progressing to goal      Endings: None Reported    Modes of Intervention: Education and Support      Discipline Responsible: [facility] [address], [employer]      Signature:  Jennifer Lobo

## 2020-06-14 NOTE — PLAN OF CARE
Problem: Discharge Planning:  Goal: Discharged to appropriate level of care  Description: Discharged to appropriate level of care  6/14/2020 1625 by Juli Elmore RN  Outcome: Ongoing  6/14/2020 0540 by Marian Nettles RN  Outcome: Not Met This Shift     Problem: Nutrition Deficit:  Goal: Ability to achieve adequate nutritional intake will improve  Description: Ability to achieve adequate nutritional intake will improve  6/14/2020 1625 by Juli Elmore RN  Outcome: Ongoing  6/14/2020 0540 by Marian Nettles RN  Outcome: Met This Shift     Problem: Sleep Pattern Disturbance:  Goal: Appears well-rested  Description: Appears well-rested  6/14/2020 1625 by Juli Elmore RN  Outcome: Ongoing  6/14/2020 0540 by Marian Nettles RN  Outcome: Met This Shift     Problem: Falls - Risk of:  Goal: Will remain free from falls  Description: Will remain free from falls  6/14/2020 1625 by Juli Elmore RN  Outcome: Ongoing  6/14/2020 0540 by Marian Nettles RN  Outcome: Met This Shift  Goal: Absence of physical injury  Description: Absence of physical injury  6/14/2020 1625 by Juli Elmore RN  Outcome: Ongoing  6/14/2020 0540 by Marian Nettles RN  Outcome: Met This Shift     Problem: Health Behavior:  Goal: Ability to verbalize adaptive coping strategies to use when suicidal feelings occur will improve  Description: Ability to verbalize adaptive coping strategies to use when suicidal feelings occur will improve  6/14/2020 1625 by Juli Elmore RN  Outcome: Ongoing  6/14/2020 0540 by Marian Nettles RN  Outcome: Ongoing  Goal: Ability to verbalize adaptive coping strategies to use when the urge to self-mutilate occurs will improve  Description: Ability to verbalize adaptive coping strategies to use when the urge to self-mutilate occurs will improve  6/14/2020 1625 by Juli Elmore RN  Outcome: Ongoing  6/14/2020 0540 by Marian Nettles RN  Outcome: Ongoing     Problem: Safety:  Goal:

## 2020-06-15 PROCEDURE — 99231 SBSQ HOSP IP/OBS SF/LOW 25: CPT | Performed by: NURSE PRACTITIONER

## 2020-06-15 PROCEDURE — 6370000000 HC RX 637 (ALT 250 FOR IP): Performed by: NURSE PRACTITIONER

## 2020-06-15 PROCEDURE — 1240000000 HC EMOTIONAL WELLNESS R&B

## 2020-06-15 PROCEDURE — 6370000000 HC RX 637 (ALT 250 FOR IP): Performed by: FAMILY MEDICINE

## 2020-06-15 PROCEDURE — 6370000000 HC RX 637 (ALT 250 FOR IP): Performed by: PSYCHIATRY & NEUROLOGY

## 2020-06-15 RX ADMIN — PRAZOSIN HYDROCHLORIDE 1 MG: 1 CAPSULE ORAL at 21:16

## 2020-06-15 RX ADMIN — DIVALPROEX SODIUM 500 MG: 500 TABLET, DELAYED RELEASE ORAL at 08:25

## 2020-06-15 RX ADMIN — FOLIC ACID 1 MG: 1 TABLET ORAL at 08:26

## 2020-06-15 RX ADMIN — Medication 100 MG: at 08:26

## 2020-06-15 RX ADMIN — HYDROXYZINE HYDROCHLORIDE 25 MG: 25 TABLET, FILM COATED ORAL at 08:26

## 2020-06-15 RX ADMIN — HYDROXYZINE HYDROCHLORIDE 25 MG: 25 TABLET, FILM COATED ORAL at 17:58

## 2020-06-15 RX ADMIN — CYANOCOBALAMIN TAB 500 MCG 500 MCG: 500 TAB at 08:26

## 2020-06-15 RX ADMIN — DIVALPROEX SODIUM 500 MG: 500 TABLET, DELAYED RELEASE ORAL at 21:16

## 2020-06-15 RX ADMIN — CITALOPRAM HYDROBROMIDE 20 MG: 20 TABLET ORAL at 08:26

## 2020-06-15 ASSESSMENT — PAIN SCALES - GENERAL: PAINLEVEL_OUTOF10: 0

## 2020-06-15 NOTE — PROGRESS NOTES
Group Therapy Note    Start Time: 866  End Time:  840  Number of Participants: 7    Type of Group: Community Meeting       Patient's Goal:  \"Relaxing, Home situation\"      Notes:        Participation Level:  Active Listener       Participation Quality: Appropriate      Thought Process/Content: Logical      Affective Functioning: Congruent      Mood: Calm      Level of consciousness:  Alert      Modes of Intervention: Support      Discipline Responsible: Behavioral Health Tech II      Signature:  Nirmala Prather

## 2020-06-15 NOTE — PROGRESS NOTES
Progress Note  Pilar Mariscal  6/14/2020 10:40 PM  Subjective:   Admit Date:   6/11/2020      CC/ADMIT DX:       Interval History:   Reviewed overnight events and nursing notes. He has c/o nasuea. I have reviewed all labs/diagnostics from the last 24hrs. ROS:   I have done a 10 point ROS and all are negative, except what is mentioned in the HPI. DIET GENERAL;    Medications:      vitamin D  50,000 Units Oral Weekly    vitamin B-12  500 mcg Oral Daily    prazosin  1 mg Oral Nightly    divalproex  500 mg Oral 2 times per day    citalopram  20 mg Oral Daily    thiamine  100 mg Oral Daily    folic acid  1 mg Oral Daily    nicotine  1 patch Transdermal Daily           Objective:   Vitals: /70   Pulse 70   Temp 98.5 °F (36.9 °C)   Resp 16   Ht 5' 11\" (1.803 m)   Wt 134 lb (60.8 kg)   SpO2 100%   BMI 18.69 kg/m²  No intake or output data in the 24 hours ending 06/14/20 2240  General appearance: alert and cooperative with exam  Extremities: extremities normal, atraumatic, no cyanosis or edema  Neurologic:  No obvious focal neurologic deficits. Skin: no rashes    Assessment and Plan:   Principal Problem:    Bipolar affective disorder, currently depressed, moderate (HCC)  Active Problems:    Post traumatic stress disorder (PTSD)    Suicidal ideation    Alcohol use disorder, severe, dependence (HCC)    Stimulant use disorder    Tobacco use disorder    Bipolar I disorder, most recent episode depressed, severe without psychotic features ([address]  Resolved Problems:    Bipolar 1 disorder ([address]    Vit D Def    Nausea    Plan:  1. Continue present medication(s)   2. Supportive care for nausea  3. Follow with Psych      Discharge planning:   home     Reviewed treatment plans with the patient and/or family.              Electronically signed by Lenore Byrd MD on 6/14/2020 at 10:40 PM

## 2020-06-15 NOTE — PROGRESS NOTES
Treatment Team Note:     SW met with [address] team to discuss Pts [place] and DC plans.      Progress/Behavior/Group Attendance: yes     Target Symptoms/Reason for admission: depression, suicidal ideation     Diagnoses: depression, suicidal ideation     UDS: Neg             BAL: Neg     AftercarePlan: [address]     Pt lives with:Homeless     Collateral obtained from: grandmother  On:10/14/12     Family Session: JOSE MIGUEL     Misc:

## 2020-06-15 NOTE — PROGRESS NOTES
[facility] Adult Unit Daily Assessment  Nursing Progress Note    Room: [facility]   Name: Chip Dumont   Age: 34 y.o. Gender: male   Dx: Bipolar affective disorder, currently depressed, moderate (HCC)  Precautions: suicide risk and seizure precautions  Inpatient Status: voluntary       SLEEP:    Sleep Quality Fair  Sleep Medications: No   PRN Sleep Meds: No       MEDICAL:    Other PRN Meds: Yes   Med Compliant: Yes  Accu-Chek: No  Oxygen/CPAP/BiPAP: No  CIWA/CINA: Yes   PAIN Assessment: none  Side Effects from medication: No      PSYCH:    Depression: 3   Anxiety: 9   SI denies suicidal ideation   HI Negative for homicidal ideation      AVH:Absent      GENERAL:    Appetite: decreased    Social: No   Speech: pressured   Appearance: appropriately dressed, appropriately groomed and healthy looking    GROUP:    Group Participation: Yes  Participation Quality: Interactive    Notes:  Spent 1:1 time with patient during CIWA assessment. Patient with C/O:  severe constant anxiety he rated at a 9; decreased appetite/food intolerance d/t continued nausea with dry heaves (admitted to some relief from Zofran administration on prior shift); fair sleep d/t frequent spontaneous awakenings. See MAR, post medication per CIWA score patient became less isolative and was out of room for approximately 1 hour, did have HS snack also. Social with staff, less so with peers. Cooperative and pleasant with interview, medication administration, and assessments. Resting quietly in room with lights off and eyes closed at present. Good eye contact when converses.         Electronically signed by Carlos Holland RN on 6/14/20 at 10:48 PM CDT

## 2020-06-15 NOTE — PLAN OF CARE
Problem: Safety:  Goal: Ability to contract for his/her safety will improve  6/15/2020 1627 by Rose Crowe  Outcome: Ongoing       Group Therapy Note     Date: 6/15/2020  Start Time: 4425  End Time:  1600  Number of Participants: 5     Type of Group: Recovery     Wellness Binder Information  Module Name:  relapse prevention  Session Number:  2     Patient's Goal:  identifying early warning signs     Notes:  pt acknowledged negative thinking as an early warning sign to help prevent relapse.      Status After Intervention:  Improved     Participation Level: Interactive     Participation Quality: Appropriate, Attentive, and Sharing        Speech:  normal        Thought Process/Content: Logical        Affective Functioning: Congruent        Mood: congruent        Level of consciousness:  Alert, Oriented x4, and Attentive        Response to Learning: Able to verbalize current knowledge/experience        Endings: None Reported     Modes of Intervention: Education        Discipline Responsible: Psychoeducational Specialist        Signature:  Rose Crowe

## 2020-06-15 NOTE — PROGRESS NOTES
Tobacco Use   Smoking Status Current Every Day Smoker    Packs/day: 1.00   Smokeless Tobacco Never Used        Family History:     Family History   Problem Relation Age of Onset    Other Father          Vital Signs:  Last set of tests and vitals:  Vitals:    06/15/20 0823   BP: 130/73   Pulse: 88   Resp: 18   Temp: 97.8 °F (36.6 °C)   SpO2: 100%          Mental Status:  Level of consciousness:  within normal limits and awake  Appearance:  well-appearing, street clothes, hospital attire, in chair, good grooming and good hygiene  Behavior/Motor:  no abnormalities noted  Attitude toward examiner:  cooperative, attentive and good eye contact  Speech:  normal rate and normal volume  Mood:  \"I am feeling a lot better. \"  Affect:  mood congruent  Thought processes:  linear and goal directed  Thought content:  Homocidal ideation :denies  Suicidal Ideation:  denies suicidal ideation  Delusions:  no evidence of delusions  Perceptual Disturbance:  denies any perceptual disturbance  Cognition:  oriented to person, place, and time  Concentration : fair  Memory intact for recent and remote  Fund of knowledge:  average  Abstract thinking:  adequate  Insight: improved/good  Judgment:  good     vitamin D  50,000 Units Oral Weekly    vitamin B-12  500 mcg Oral Daily    prazosin  1 mg Oral Nightly    divalproex  500 mg Oral 2 times per day    citalopram  20 mg Oral Daily    thiamine  100 mg Oral Daily    folic acid  1 mg Oral Daily    nicotine  1 patch Transdermal Daily       Current Medications:  Current Facility-Administered Medications   Medication Dose Route Frequency Provider Last Rate Last Dose    ondansetron (ZOFRAN) tablet 4 mg  4 mg Oral Q6H PRN Marilee Chávez MD   4 mg at 06/14/20 1725    vitamin D (ERGOCALCIFEROL) capsule 50,000 Units  50,000 Units Oral Weekly Marilee Chávez MD   50,000 Units at 06/13/20 1516    vitamin B-12 (CYANOCOBALAMIN) tablet 500 mcg  500 mcg Oral Daily Marilee Chávez MD   500 medications  VPA level in the am  Discharge tomorrow    Amount of time spent with patient:  15 minutes with greater than 50% of the time spent in counseling and collaboration of care.     TYRESE Rodrigez  Clinician Signature: signed electronically

## 2020-06-16 VITALS
BODY MASS INDEX: 18.76 KG/M2 | TEMPERATURE: 98.7 F | HEIGHT: 71 IN | WEIGHT: 134 LBS | SYSTOLIC BLOOD PRESSURE: 120 MMHG | DIASTOLIC BLOOD PRESSURE: 63 MMHG | OXYGEN SATURATION: 99 % | HEART RATE: 89 BPM | RESPIRATION RATE: 20 BRPM

## 2020-06-16 PROBLEM — F34.9 PERSISTENT MOOD (AFFECTIVE) DISORDER, UNSPECIFIED (HCC): Status: ACTIVE | Noted: 2020-06-16

## 2020-06-16 PROBLEM — F31.32 BIPOLAR DISORDER, CURRENT EPISODE DEPRESSED, MODERATE (HCC): Status: ACTIVE | Noted: 2020-06-16

## 2020-06-16 LAB — VALPROIC ACID LEVEL: 68.5 UG/ML (ref 50–100)

## 2020-06-16 PROCEDURE — 6370000000 HC RX 637 (ALT 250 FOR IP): Performed by: FAMILY MEDICINE

## 2020-06-16 PROCEDURE — 6370000000 HC RX 637 (ALT 250 FOR IP): Performed by: PSYCHIATRY & NEUROLOGY

## 2020-06-16 PROCEDURE — 99238 HOSP IP/OBS DSCHRG MGMT 30/<: CPT | Performed by: NURSE PRACTITIONER

## 2020-06-16 PROCEDURE — 80164 ASSAY DIPROPYLACETIC ACD TOT: CPT

## 2020-06-16 PROCEDURE — 36415 COLL VENOUS BLD VENIPUNCTURE: CPT

## 2020-06-16 PROCEDURE — 5130000000 HC BRIDGE APPOINTMENT

## 2020-06-16 PROCEDURE — 6370000000 HC RX 637 (ALT 250 FOR IP): Performed by: NURSE PRACTITIONER

## 2020-06-16 RX ORDER — DIVALPROEX SODIUM 500 MG/1
500 TABLET, DELAYED RELEASE ORAL EVERY 12 HOURS SCHEDULED
Qty: 60 TABLET | Refills: 0 | Status: ON HOLD | OUTPATIENT
Start: 2020-06-16 | End: 2022-02-08 | Stop reason: HOSPADM

## 2020-06-16 RX ORDER — LANOLIN ALCOHOL/MO/W.PET/CERES
100 CREAM (GRAM) TOPICAL DAILY
Qty: 30 TABLET | Refills: 0 | Status: ON HOLD | OUTPATIENT
Start: 2020-06-17 | End: 2022-02-08 | Stop reason: HOSPADM

## 2020-06-16 RX ORDER — FOLIC ACID 1 MG/1
1 TABLET ORAL DAILY
Qty: 30 TABLET | Refills: 0 | Status: ON HOLD | OUTPATIENT
Start: 2020-06-17 | End: 2022-02-08 | Stop reason: HOSPADM

## 2020-06-16 RX ORDER — PRAZOSIN HYDROCHLORIDE 1 MG/1
1 CAPSULE ORAL NIGHTLY
Qty: 30 CAPSULE | Refills: 0 | Status: ON HOLD | OUTPATIENT
Start: 2020-06-16 | End: 2022-02-08 | Stop reason: HOSPADM

## 2020-06-16 RX ORDER — ERGOCALCIFEROL 1.25 MG/1
50000 CAPSULE ORAL WEEKLY
Qty: 5 CAPSULE | Refills: 0 | Status: ON HOLD | OUTPATIENT
Start: 2020-06-20 | End: 2022-04-19 | Stop reason: HOSPADM

## 2020-06-16 RX ORDER — CITALOPRAM 20 MG/1
20 TABLET ORAL DAILY
Qty: 30 TABLET | Refills: 0 | Status: ON HOLD | OUTPATIENT
Start: 2020-06-17 | End: 2022-02-08 | Stop reason: HOSPADM

## 2020-06-16 RX ADMIN — DIVALPROEX SODIUM 500 MG: 500 TABLET, DELAYED RELEASE ORAL at 08:03

## 2020-06-16 RX ADMIN — CYANOCOBALAMIN TAB 500 MCG 500 MCG: 500 TAB at 08:04

## 2020-06-16 RX ADMIN — FOLIC ACID 1 MG: 1 TABLET ORAL at 08:04

## 2020-06-16 RX ADMIN — Medication 100 MG: at 08:04

## 2020-06-16 RX ADMIN — CITALOPRAM HYDROBROMIDE 20 MG: 20 TABLET ORAL at 08:06
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
## 2020-06-16 NOTE — PLAN OF CARE
Problem: Discharge Planning:  Goal: Discharged to appropriate level of care  Outcome: Ongoing      Group Therapy Note     Date: 6/16/2020  Start Time: 1000  End Time:  6808  Number of Participants: 7     Type of Group: Psychotherapy     Wellness Binder Information  Module Name:  staying well  Session Number:  1     Patient's Goal:  daily maintenance and coping skills     Notes:  pt acknowledged use of positive coping skills daily to help stay well.      Status After Intervention:  Improved     Participation Level: Interactive     Participation Quality: Appropriate, Attentive, and Sharing        Speech:  normal        Thought Process/Content: Logical        Affective Functioning: Congruent        Mood: congruent        Level of consciousness:  Alert, Oriented x4, and Attentive        Response to Learning: Able to verbalize current knowledge/experience        Endings: None Reported     Modes of Intervention: Education        Discipline Responsible: Psychoeducational Specialist        Signature:  Mini Corral

## 2022-02-05 ENCOUNTER — HOSPITAL ENCOUNTER (INPATIENT)
Age: 32
LOS: 5 days | Discharge: HOME | DRG: 885 | End: 2022-02-10
Attending: PSYCHIATRY & NEUROLOGY | Admitting: PSYCHIATRY & NEUROLOGY
Payer: MEDICAID

## 2022-02-05 DIAGNOSIS — R45.851 DEPRESSION WITH SUICIDAL IDEATION: Primary | ICD-10-CM

## 2022-02-05 DIAGNOSIS — F32.A DEPRESSION WITH SUICIDAL IDEATION: Primary | ICD-10-CM

## 2022-02-05 LAB
ALBUMIN SERPL-MCNC: 4.6 G/DL (ref 3.5–5.2)
ALP BLD-CCNC: 70 U/L (ref 40–130)
ALT SERPL-CCNC: 8 U/L (ref 5–41)
AMPHETAMINE SCREEN, URINE: NEGATIVE
ANION GAP SERPL CALCULATED.3IONS-SCNC: 21 MMOL/L (ref 7–19)
AST SERPL-CCNC: 22 U/L (ref 5–40)
BARBITURATE SCREEN URINE: NEGATIVE
BASOPHILS ABSOLUTE: 0 K/UL (ref 0–0.2)
BASOPHILS RELATIVE PERCENT: 0.3 % (ref 0–1)
BENZODIAZEPINE SCREEN, URINE: NEGATIVE
BILIRUB SERPL-MCNC: 0.3 MG/DL (ref 0.2–1.2)
BUN BLDV-MCNC: 14 MG/DL (ref 6–20)
CALCIUM SERPL-MCNC: 9.2 MG/DL (ref 8.6–10)
CANNABINOID SCREEN URINE: NEGATIVE
CHLORIDE BLD-SCNC: 101 MMOL/L (ref 98–111)
CO2: 21 MMOL/L (ref 22–29)
COCAINE METABOLITE SCREEN URINE: NEGATIVE
CREAT SERPL-MCNC: 0.7 MG/DL (ref 0.5–1.2)
EOSINOPHILS ABSOLUTE: 0 K/UL (ref 0–0.6)
EOSINOPHILS RELATIVE PERCENT: 0.1 % (ref 0–5)
ETHANOL: <10 MG/DL (ref 0–0.08)
GFR AFRICAN AMERICAN: >59
GFR NON-AFRICAN AMERICAN: >60
GLUCOSE BLD-MCNC: 72 MG/DL (ref 74–109)
HCT VFR BLD CALC: 42.5 % (ref 42–52)
HEMOGLOBIN: 13.7 G/DL (ref 14–18)
IMMATURE GRANULOCYTES #: 0.1 K/UL
LYMPHOCYTES ABSOLUTE: 1.7 K/UL (ref 1.1–4.5)
LYMPHOCYTES RELATIVE PERCENT: 11.2 % (ref 20–40)
Lab: NORMAL
MCH RBC QN AUTO: 28.7 PG (ref 27–31)
MCHC RBC AUTO-ENTMCNC: 32.2 G/DL (ref 33–37)
MCV RBC AUTO: 89.1 FL (ref 80–94)
MONOCYTES ABSOLUTE: 0.7 K/UL (ref 0–0.9)
MONOCYTES RELATIVE PERCENT: 4.3 % (ref 0–10)
NEUTROPHILS ABSOLUTE: 12.7 K/UL (ref 1.5–7.5)
NEUTROPHILS RELATIVE PERCENT: 83.8 % (ref 50–65)
OPIATE SCREEN URINE: NEGATIVE
PDW BLD-RTO: 12.3 % (ref 11.5–14.5)
PLATELET # BLD: 362 K/UL (ref 130–400)
PMV BLD AUTO: 9.7 FL (ref 9.4–12.4)
POTASSIUM REFLEX MAGNESIUM: 3.8 MMOL/L (ref 3.5–5)
RBC # BLD: 4.77 M/UL (ref 4.7–6.1)
SARS-COV-2, NAAT: NOT DETECTED
SODIUM BLD-SCNC: 143 MMOL/L (ref 136–145)
TOTAL PROTEIN: 7.7 G/DL (ref 6.6–8.7)
WBC # BLD: 15.1 K/UL (ref 4.8–10.8)

## 2022-02-05 PROCEDURE — 99285 EMERGENCY DEPT VISIT HI MDM: CPT

## 2022-02-05 PROCEDURE — 82077 ASSAY SPEC XCP UR&BREATH IA: CPT

## 2022-02-05 PROCEDURE — 36415 COLL VENOUS BLD VENIPUNCTURE: CPT

## 2022-02-05 PROCEDURE — 87635 SARS-COV-2 COVID-19 AMP PRB: CPT

## 2022-02-05 PROCEDURE — 85025 COMPLETE CBC W/AUTO DIFF WBC: CPT

## 2022-02-05 PROCEDURE — 80307 DRUG TEST PRSMV CHEM ANLYZR: CPT

## 2022-02-05 PROCEDURE — 80053 COMPREHEN METABOLIC PANEL: CPT

## 2022-02-05 PROCEDURE — 6370000000 HC RX 637 (ALT 250 FOR IP): Performed by: PSYCHIATRY & NEUROLOGY

## 2022-02-05 PROCEDURE — 1240000000 HC EMOTIONAL WELLNESS R&B

## 2022-02-05 RX ORDER — POLYETHYLENE GLYCOL 3350 17 G/17G
17 POWDER, FOR SOLUTION ORAL DAILY PRN
Status: DISCONTINUED | OUTPATIENT
Start: 2022-02-05 | End: 2022-02-10 | Stop reason: HOSPADM

## 2022-02-05 RX ORDER — NICOTINE 21 MG/24HR
1 PATCH, TRANSDERMAL 24 HOURS TRANSDERMAL DAILY
Status: DISCONTINUED | OUTPATIENT
Start: 2022-02-06 | End: 2022-02-10 | Stop reason: HOSPADM

## 2022-02-05 RX ORDER — TRAZODONE HYDROCHLORIDE 50 MG/1
50 TABLET ORAL NIGHTLY PRN
Status: DISCONTINUED | OUTPATIENT
Start: 2022-02-05 | End: 2022-02-06

## 2022-02-05 RX ORDER — HYDROXYZINE HYDROCHLORIDE 25 MG/1
25 TABLET, FILM COATED ORAL 3 TIMES DAILY PRN
Status: DISCONTINUED | OUTPATIENT
Start: 2022-02-05 | End: 2022-02-10 | Stop reason: HOSPADM

## 2022-02-05 RX ORDER — ACETAMINOPHEN 325 MG/1
650 TABLET ORAL EVERY 4 HOURS PRN
Status: DISCONTINUED | OUTPATIENT
Start: 2022-02-05 | End: 2022-02-10 | Stop reason: HOSPADM

## 2022-02-05 RX ADMIN — HYDROXYZINE HYDROCHLORIDE 25 MG: 25 TABLET ORAL at 22:48

## 2022-02-05 RX ADMIN — TRAZODONE HYDROCHLORIDE 50 MG: 50 TABLET ORAL at 22:48

## 2022-02-05 ASSESSMENT — ENCOUNTER SYMPTOMS
SHORTNESS OF BREATH: 0
BACK PAIN: 0
DIARRHEA: 0
ABDOMINAL PAIN: 0
NAUSEA: 0
COUGH: 0
RHINORRHEA: 0
VOMITING: 0

## 2022-02-05 ASSESSMENT — LIFESTYLE VARIABLES: HISTORY_ALCOHOL_USE: YES

## 2022-02-05 ASSESSMENT — SLEEP AND FATIGUE QUESTIONNAIRES
DO YOU USE A SLEEP AID: NO
AVERAGE NUMBER OF SLEEP HOURS: 4
DIFFICULTY FALLING ASLEEP: NO
DO YOU HAVE DIFFICULTY SLEEPING: YES
DIFFICULTY ARISING: NO
SLEEP PATTERN: EARLY AWAKENING
DIFFICULTY STAYING ASLEEP: YES
RESTFUL SLEEP: YES

## 2022-02-05 ASSESSMENT — PATIENT HEALTH QUESTIONNAIRE - PHQ9: SUM OF ALL RESPONSES TO PHQ QUESTIONS 1-9: 13

## 2022-02-05 NOTE — ED TRIAGE NOTES
Pt is homeless and was picked up at [facility] and states he is SI and wants to hang himself no HI/AH/VH

## 2022-02-05 NOTE — ED PROVIDER NOTES
[facility] EMERGENCY DEPT  EMERGENCY DEPARTMENT ENCOUNTER      Pt Name: Candi Bergman  MRN: [id number]  [place] 1990  Date of evaluation: 2/5/2022  Provider: TYRESE Carreno CNP    CHIEF COMPLAINT       Chief Complaint   Patient presents with    Suicidal         HISTORY OF PRESENT ILLNESS   (Location/Symptom, Timing/Onset, Context/Setting, Quality, Duration, Modifying Factors, Severity)  Note limiting factors. Candi Bergman is a 36 y.o. male who presents to the emergency department with concern for SI. States he woke up this morning feeling like hanging himself. He has had similar previous feelings in past, most recently requiring admission approx 2 years ago. He tells me he was supposed to be on medication for this, but hasn't been in quite some time. He is homeless. He was picked up [place] the [place] near [employer]. He tells me he is from the area. That he has family in the area, and that they know he is homeless. He tells me he does not drink alcohol or use any drugs, ilicit or prescription. He won't really tell me about any increased stressors in his life currently. HPI    Nursing Notes were reviewed. REVIEW OF SYSTEMS    (2-9 systems for level 4, 10 or more for level 5)     Review of Systems   Constitutional: Negative for chills and fever. HENT: Negative for congestion and rhinorrhea. Respiratory: Negative for cough and shortness of breath. Cardiovascular: Negative for chest pain and palpitations. Gastrointestinal: Negative for abdominal pain, diarrhea, nausea and vomiting. Genitourinary: Negative for dysuria, flank pain, frequency and urgency. Musculoskeletal: Negative for back pain and neck pain. Neurological: Negative for dizziness, syncope, weakness, light-headedness and headaches. Except as noted above the remainder of the review of systems was reviewed and negative.        PAST MEDICAL HISTORY     Past Medical History:   Diagnosis Date    Alcohol abuse  Bipolar 1 disorder (HCC)     Post traumatic stress disorder (PTSD)          SURGICAL HISTORY       Past Surgical History:   Procedure Laterality Date    OTHER SURGICAL HISTORY      cut tendon in right little finger         CURRENT MEDICATIONS       Previous Medications    CITALOPRAM (CELEXA) 20 MG TABLET    Take 1 tablet by mouth daily    DIVALPROEX (DEPAKOTE) 500 MG DR TABLET    Take 1 tablet by mouth every 12 hours    FOLIC ACID (FOLVITE) 1 MG TABLET    Take 1 tablet by mouth daily    PRAZOSIN (MINIPRESS) 1 MG CAPSULE    Take 1 capsule by mouth nightly    VITAMIN B-1 100 MG TABLET    Take 1 tablet by mouth daily    VITAMIN B-12 500 MCG TABLET    Take 1 tablet by mouth daily    VITAMIN D (ERGOCALCIFEROL) 1.25 MG (46144 UT) CAPS CAPSULE    Take 1 capsule by mouth once a week       ALLERGIES     Patient has no known allergies. FAMILY HISTORY       Family History   Problem Relation Age of Onset    Other Father           SOCIAL HISTORY       Social History     Socioeconomic History    Marital status: Single     Spouse name: None    Number of children: 0    Years of education: None    Highest education level: None   Occupational History    None   Tobacco Use    Smoking status: Current Every Day Smoker     Packs/day: 1.00    Smokeless tobacco: Never Used   Vaping Use    Vaping Use: Never used   Substance and Sexual Activity    Alcohol use: Yes     Comment:  drink whiskey and vodka daily    Drug use: Not Currently     Types: Marijuana (Lexis Crumbly), Methamphetamines (Crystal Meth)     Comment: Patient reports he used Meth 2 days ago.       Sexual activity: Yes     Partners: Female   Other Topics Concern    None   Social History Narrative    None     Social Determinants of Health     Financial Resource Strain:     Difficulty of Paying Living Expenses: Not on file   Food Insecurity:     Worried About Running Out of Food in the Last Year: Not on file    Leon of Food in the Last Year: Not on file Neutrophils Absolute 12.7 (*)     All other components within normal limits   COMPREHENSIVE METABOLIC PANEL W/ REFLEX TO MG FOR LOW K - Abnormal; Notable for the following components:    CO2 21 (*)     Anion Gap 21 (*)     Glucose 72 (*)     All other components within normal limits   COVID-19, RAPID   ETHANOL   URINE DRUG SCREEN   URINE RT REFLEX TO CULTURE       All other labs were within normal range or not returned as of this dictation. EMERGENCY DEPARTMENT COURSE and DIFFERENTIAL DIAGNOSIS/MDM:   Vitals:    Vitals:    02/05/22 1908   BP: 114/67   Pulse: 96   Resp: 16   Temp: 98.9 °F (37.2 °C)   TempSrc: Oral   SpO2: 95%   Weight: 138 lb (62.6 kg)   Height: 5' 11\" (1.803 m)       MDM      REASSESSMENT     ED Course as of 02/05/22 2011   Sat Feb 05, 2022   1600 Notified [facility] of need for psych evaluation [BW]      ED Course User Index  [BW] TYRESE Barrientos CNP     eval by Joseph Townsend with plans for admission to Dr Rosalia Vásquez. CRITICAL CARE TIME       CONSULTS:  IP CONSULT TO INTERNAL MEDICINE    PROCEDURES:  Unless otherwise noted below, none     Procedures         FINAL IMPRESSION      1. Depression with suicidal ideation          DISPOSITION/PLAN   DISPOSITION        PATIENT REFERRED TO:  No follow-up provider specified. DISCHARGE MEDICATIONS:  New Prescriptions    No medications on file     Controlled Substances Monitoring:     No flowsheet data found.     (Please note that portions of this note were completed with a voice recognition program.  Efforts were made to edit the dictations but occasionally words are mis-transcribed.)    TYRESE Rojo CNP (electronically signed)  Attending Emergency Physician         TYRESE Rojo CNP  02/05/22 2012

## 2022-02-06 PROCEDURE — 99223 1ST HOSP IP/OBS HIGH 75: CPT | Performed by: PSYCHIATRY & NEUROLOGY

## 2022-02-06 PROCEDURE — 1240000000 HC EMOTIONAL WELLNESS R&B

## 2022-02-06 PROCEDURE — 6370000000 HC RX 637 (ALT 250 FOR IP): Performed by: PSYCHIATRY & NEUROLOGY

## 2022-02-06 RX ORDER — TRAZODONE HYDROCHLORIDE 50 MG/1
50 TABLET ORAL NIGHTLY
Status: DISCONTINUED | OUTPATIENT
Start: 2022-02-06 | End: 2022-02-10 | Stop reason: HOSPADM

## 2022-02-06 RX ORDER — BUPROPION HYDROCHLORIDE 150 MG/1
150 TABLET ORAL DAILY
Status: DISCONTINUED | OUTPATIENT
Start: 2022-02-06 | End: 2022-02-10 | Stop reason: HOSPADM

## 2022-02-06 RX ADMIN — TRAZODONE HYDROCHLORIDE 50 MG: 50 TABLET ORAL at 21:37

## 2022-02-06 RX ADMIN — HYDROXYZINE HYDROCHLORIDE 25 MG: 25 TABLET ORAL at 21:37

## 2022-02-06 RX ADMIN — BUPROPION HYDROCHLORIDE 150 MG: 150 TABLET, EXTENDED RELEASE ORAL at 12:48

## 2022-02-06 ASSESSMENT — PAIN SCALES - GENERAL: PAINLEVEL_OUTOF10: 0
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
## 2022-02-06 NOTE — BH NOTE
BHI Admission from ED  Nursing Admission Note        Reason for Admission: Brandi Hayes is a 36 YOA,  male, admitted from ED this PM, per services of Dr. Roger Huerta, with diagnosis of [address]. He reports SI starting this AM with plan to hang self. His biological Father, who he reports never meeting,  of Suicide by hanging. Marshal Martin called EMS this afternoon d/t fears of acting upon his thoughts. He reports he was thinking where he could get a rope from. He endorses Depression for apporximatley two months with poor sleep, decreased energy and self imposed isolation. He shares that he has not taken any prescribed medications for almost a year and is not seeing psychiatric or counseling services out-patient as this time. Marshal Martin has a past h/o s/f:  PTSD, Bipolar affecticve disorder, ETOH and substance abuse disorders. He has been to rehab facilities on two occasions and reports being sober from all substances since approximately 2020. He has been hospitalized for Psychiatric diagnosis on two past occurences. Marshal Martin reports feeling hopeless, helpless and worthless related to his parents told him he had to leave his Grandmother's house toay where he has been living, leaving him now homeless.     Patient Active Problem List   Diagnosis    Auditory hallucinations    Substance or medication-induced psychotic disorder, with hallucinations ([address]    Ketosis ([address]    Post traumatic stress disorder (PTSD)    History of alcohol abuse    Psychosis ([address]    Suicidal ideation    Alcohol use disorder, severe, dependence ([address]    Stimulant use disorder    Bipolar affective disorder, currently depressed, moderate ([address]    Tobacco use disorder    Bipolar I disorder, most recent episode depressed, severe without psychotic features ([address]    Persistent mood (affective) disorder, unspecified ([address]    Bipolar disorder, current episode depressed, moderate ([address]    Depression         Addictive Behavior:   Addictive Behavior  In the past 3 months, have you felt or has someone told you that you have a problem with:  : None  Do you have a history of Alcohol Use?: Yes (past h/o, sober for over 18 months.)  Do you have a history of Street Drug Abuse?: Yes (Past h/o, sober for over 18 months.)    Medical Problems:   Past Medical History:   Diagnosis Date    Alcohol abuse     [facility]     Post traumatic stress disorder (PTSD)        Status EXAM:  Status and Exam  Normal: No  Facial Expression: Sad,Worried,Flat  Affect: Congruent  Level of Consciousness: Alert  Mood:Normal: No  Mood: Depressed,Anxious,Anhedonia,Despair,Worthless, low self-esteem,Sad,Helpless  Motor Activity:Normal: No  Motor Activity: Decreased (Mildly decreased.)  Interview Behavior: Cooperative  Preception: [employer] to Time,Fellows to Place,Fellows to Situation  Attention:Normal: No  Attention: Distractible  Thought Processes: Circumstantial  Thought Content:Normal: No  Thought Content: Preoccupations  Hallucinations: None  Delusions: No  Memory:Normal: Yes  Insight and Judgment: No  Insight and Judgment: Poor Judgment,Poor Insight  Present Suicidal Ideation: Yes  Present Homicidal Ideation: No      Metabolic Screening:    No results found for: LABA1C  No results found for: CHOL  No results found for: TRIG  No results found for: HDL  No components found for: LDLCAL  No results found for: LABVLDL    Body mass index is 18.49 kg/m².   BP Readings from Last 2 Encounters:   02/05/22 125/64   06/16/20 120/63       PATIENT STRENGTHS:       Patient Strengths and Limitations:  Limitations: Tendency to isolate self,General negative or hopeless attitude about future/recovery,Hopeless about future      Tobacco Screening:  Practical Counseling, on admission, nohemi X, if applicable and completed (first 3 are required if patient doesn't refuse):            Recognizing danger situations (included triggers and roadblocks)   yes Coping skills (new ways to manage stress, exercise, relaxation techniques, changing routine, distraction  no                                                    Basic information about quitting (benefits of quitting, techniques in how to quit, available resources yes  Referral for counseling faxed to [facility]     refused                                       Patient refused counseling yes  Patient has not smoked in the last 30 days no, pt is a current smoker  Patient offered nicotine patch. Received yes, receives in AM  Refused no  Patient is a non-smoker no         Admission to Unit:    Pt admitted to [facility] under the care of Dr. Macy Bey,  arrived on unit via [facility] with security and staff from ED    Patient arrived dressed in paper scrubs:  yes. Body assessment and safety check completed by two staff nursed and  no contraband discovered. Patient belongings and valuables was cataloged and accounted for by staff [facility] tech and nursing staff. Admission completed by Staff Nurse and [facility] clinical nurse  Oriented to unit, unit policy and expectations:  yes    Reviewed and explained all legal documents:  yes    Education for Fall Prevention and Restraints given: yes    Patient signed all legal documents yes   Pt verbalizes understanding:yes     Zully Bragg? yes    Identifies stressors. yes   Pt SI ideations, pt feels hopeless and worthless, pt homeless at this time due to family conflict. COVID TEACHING: Nursing provided education regarding COVID for social distancing, wearing masks, washing hands, and reporting any symptoms: yes  Mask Provided: yes If patient refused, reason: NO      Admission Note:      Pt admitted to [facility] Adult at 2043. Pt is calm and cooperative upon admission to unit, affect flat, pt cooperative, pt stated that anxiety and depression are high, pt has feeling of hopelessness and worthlessness, at time of admission.  Pt states that SI thoughts are on and off, contracted for safety on the unit. Pt searched, denies HI and AVH at time of admission, pt was oriented to room and unit, pt admission completed, and  pt compliant with night medications and is resting in room at this time. Will continue to monitor for safety and comfort.        Electronically signed by Vianey Herrera RN on 2/5/22 at 11:24 PM CST

## 2022-02-06 NOTE — PROGRESS NOTES
Admission Note      Reason for admission/Target Symptom: Patient admitted to [facility] due to concern for SI. States he woke up yesterday morning feeling like hanging himself. He has had similar feelings in past, but most recently requiring admission approx 2 years ago. He also says that he was supposed to be on medication for this, but hasn't been in quite some time. He is homeless and says he was picked up in the cold near [place]. Pt reports that he is from the area and that his family also lives in the area, and that they know he is homeless. He claims that he does not drink alcohol or use any drugs, ilicit or prescription. He won't really discuss any increased stressors in his life currently. Pt denies HI, AVH, and Delusions. Diagnoses: Depression with Suicidal Ideation    UDS: Negative  BAL: Negative <10    SW met with treatment team to discuss patient's treatment including care planning, discharge planning, and follow-up needs. Pt has been admitted to [facility]. Treatment team has identified patient's discharge needs as medication management and outpatient therapy/counseling. Pt confirmed  the need for ongoing treatment post inpatient stay. Pt was also provided a handout of contact information for drug and alcohol treatment centers and other community support service such as [facility], AA, and [facility].

## 2022-02-06 NOTE — PROGRESS NOTES
Requirement Note     SW met with pt to complete Psychosocial and CSSR-S on this date. Patients long and short term goals discussed. Patient voiced understanding. Treatment plan sheet signed. Patient verbalized understanding of the treatment plan. Patient participated in goals and objectives of the treatment plan. Patient discussed safety plan with . In the last 6 months has the pt been a danger to self: NO  In the last 6 months has the pt been a danger to others: NO  Legal Guardian/POA: NO     Provided patient with [employer] handout entitled \"Quitting Smoking. \"  Reviewed handout with patient: addressing dangers of smoking, developing coping skills, and providing basic information about quitting. Patient received all components practical counseling of tobacco practical counseling during the hospital stay.

## 2022-02-06 NOTE — PLAN OF CARE
Problem: Self-Esteem - Low:  Goal: Demonstrates positive self-esteem  Description: Demonstrates positive self-esteem  Outcome: Ongoing     Problem: Suicide risk  Goal: Provide patient with safe environment  Description: Provide patient with safe environment  Outcome: Ongoing     Problem: Depressive Behavior With or Without Suicide Precautions:  Goal: Ability to disclose and discuss suicidal ideas will improve  Description: Ability to disclose and discuss suicidal ideas will improve  Outcome: Ongoing

## 2022-02-06 NOTE — H&P
HISTORY and PHYSICAL      CHIEF COMPLAINT:  Depression, SI    Reason for Admission:  Depression, SI    History Obtained From:  Patient, chart    HISTORY OF PRESENT ILLNESS:      The patient is a 36 y.o. male who is admitted to the [place] [address] unit with worsening mood issues. He has no c/o CP or SOA. No abdominal pain or N/V. No dysuria. No new pain issues. No fevers. No HA or dizziness. Past Medical History:        Diagnosis Date    Alcohol abuse     [facility]     Post traumatic stress disorder (PTSD)      Past Surgical History:        Procedure Laterality Date    OTHER SURGICAL HISTORY      cut tendon in right little finger         Medications Prior to Admission:    Medications Prior to Admission: citalopram (CELEXA) 20 MG tablet, Take 1 tablet by mouth daily  divalproex (DEPAKOTE) 500 MG DR tablet, Take 1 tablet by mouth every 12 hours  folic acid (FOLVITE) 1 MG tablet, Take 1 tablet by mouth daily  prazosin (MINIPRESS) 1 MG capsule, Take 1 capsule by mouth nightly  vitamin B-1 100 MG tablet, Take 1 tablet by mouth daily  vitamin B-12 500 MCG tablet, Take 1 tablet by mouth daily  vitamin D (ERGOCALCIFEROL) 1.25 MG (16697 UT) CAPS capsule, Take 1 capsule by mouth once a week    Allergies:  Patient has no known allergies. Social History:   TOBACCO:   reports that he has been smoking. He has a 10.00 pack-year smoking history. He has never used smokeless tobacco.  ETOH:   reports previous alcohol use. DRUGS:   reports previous drug use.   MARITAL STATUS:  Not   OCCUPATION:  Not working  Patient currently is homeless      Family History:       Problem Relation Age of Onset    Other Father      REVIEW OF SYSTEMS:  Constitutional: neg  CV: neg  Pulmonary: neg  GI: neg  : neg  Psych: depression, SI  Neuro: neg  Skin: neg  MusculoSkeletal: neg  HEENT: tooth pain  Joints: neg    Vitals:  /70   Pulse 100   Temp 97.7 °F (36.5 °C) (Tympanic)   Resp 16   Ht 5' 11\" (1.803 m)   Wt 132 lb 9.6 oz (60.1 kg)   SpO2 97%   BMI 18.49 kg/m²     PHYSICAL EXAM:  Gen: NAD, alert, in bed  HEENT: WNL  Lymph: no LAD  Neck: no JVD or masses  Chest: CTA bilat  CV: RRR  Abdomen: NT/ND  Extrem: no C/C/E  Neuro: non focal  Skin: no rashes  Joints: no redness    DATA:  I have reviewed the admission labs and imaging tests.     ASSESSMENT AND PLAN:      Principal Problem:    Bipolar affective disorder, currently depressed, moderate, SI---follow with Psych    H/O Polysubstance Abuse     Anemia    Leukocytosis      Nash Albrecht MD  1:59 PM 2/6/2022

## 2022-02-06 NOTE — PROGRESS NOTES
Group Therapy Note     Date: 02/06/22  Start QWNF: 9219  End MYRS:2668     Number of Participants: 16     Type of Group: self inventory     Patient's Goal:  question nohemi     Notes:  Patient completed self inventory and filled out menu     Status After Intervention:  Unchanged     Participation Level: Minimal     Participation Quality: Appropriate     Speech:  normal     Thought Process/Content: Logical  Linear     Affective Functioning: Congruent     Mood: calm     Level of consciousness:  Alert and Oriented x4     Response to Learning: Able to verbalize current knowledge/experience     Modes of Intervention: Education and Support     Discipline Responsible: Registered Nurse     Monica Palacios RN
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
## 2022-02-06 NOTE — PROGRESS NOTES
Behavioral Services  Medicare Certification Upon Admission    I certify that this patient's inpatient psychiatric hospital admission is medically necessary for:    [x] (1) Treatment which could reasonably be expected to improve this patient's condition,       [] (2) Or for diagnostic study;     AND     [x](2) The inpatient psychiatric services are provided while the individual is under the care of a physician and are included in the individualized plan of care.     Estimated length of stay/service 3-5 days  Plan for post-hospital care TBA    Electronically signed by Jorge Luis Sales MD on 2/6/2022 at 12:16 PM

## 2022-02-06 NOTE — PROGRESS NOTES
Pt arrived on [facility] adult at 2043, pt is calm and cooperative, pt stated that SI thoughts continue to be on and off. Pt VS stable, pt affect flat, behavior is calm at this time. Pt is oriented x4 to unit and and room, search completed and patient will continue to be monitored every 15 min for safety.

## 2022-02-07 LAB
CHOLESTEROL, TOTAL: 116 MG/DL (ref 160–199)
HBA1C MFR BLD: 4.7 % (ref 4–6)
HDLC SERPL-MCNC: 52 MG/DL (ref 55–121)
LDL CHOLESTEROL CALCULATED: 44 MG/DL
TRIGL SERPL-MCNC: 98 MG/DL (ref 0–149)
TSH REFLEX FT4: 1.73 UIU/ML (ref 0.35–5.5)
VITAMIN B-12: 395 PG/ML (ref 211–946)
VITAMIN D 25-HYDROXY: 12.9 NG/ML

## 2022-02-07 PROCEDURE — 84443 ASSAY THYROID STIM HORMONE: CPT

## 2022-02-07 PROCEDURE — 36415 COLL VENOUS BLD VENIPUNCTURE: CPT

## 2022-02-07 PROCEDURE — 6370000000 HC RX 637 (ALT 250 FOR IP): Performed by: PSYCHIATRY & NEUROLOGY

## 2022-02-07 PROCEDURE — 6370000000 HC RX 637 (ALT 250 FOR IP): Performed by: FAMILY MEDICINE

## 2022-02-07 PROCEDURE — 82607 VITAMIN B-12: CPT

## 2022-02-07 PROCEDURE — 82306 VITAMIN D 25 HYDROXY: CPT

## 2022-02-07 PROCEDURE — 1240000000 HC EMOTIONAL WELLNESS R&B

## 2022-02-07 PROCEDURE — 80061 LIPID PANEL: CPT

## 2022-02-07 PROCEDURE — 99231 SBSQ HOSP IP/OBS SF/LOW 25: CPT | Performed by: NURSE PRACTITIONER

## 2022-02-07 PROCEDURE — 83036 HEMOGLOBIN GLYCOSYLATED A1C: CPT

## 2022-02-07 RX ORDER — ERGOCALCIFEROL 1.25 MG/1
50000 CAPSULE ORAL WEEKLY
Status: DISCONTINUED | OUTPATIENT
Start: 2022-02-07 | End: 2022-02-10 | Stop reason: HOSPADM

## 2022-02-07 RX ORDER — CHOLECALCIFEROL (VITAMIN D3) 125 MCG
500 CAPSULE ORAL DAILY
Status: DISCONTINUED | OUTPATIENT
Start: 2022-02-07 | End: 2022-02-10 | Stop reason: HOSPADM

## 2022-02-07 RX ADMIN — CYANOCOBALAMIN TAB 500 MCG 500 MCG: 500 TAB at 17:13

## 2022-02-07 RX ADMIN — BUPROPION HYDROCHLORIDE 150 MG: 150 TABLET, EXTENDED RELEASE ORAL at 08:59

## 2022-02-07 RX ADMIN — ERGOCALCIFEROL 50000 UNITS: 1.25 CAPSULE ORAL at 17:13

## 2022-02-07 RX ADMIN — TRAZODONE HYDROCHLORIDE 50 MG: 50 TABLET ORAL at 21:59

## 2022-02-07 NOTE — PROGRESS NOTES
[facility] Daily Shift Assessment-adult Unit  Nursing Progress Note          Room: [address] [place] [address]   Name: Ming Martinez   Age: 36 y.o. Gender: male   Dx: <principal problem not specified>  Precautions: suicide risk  Inpatient Status: voluntary     SLEEP:    Sleep: No,   Sleep Quality Fair   Hours Slept: 6   Sleep Medications: Yes trazodone 50 mg  PRN Sleep Meds: No       MEDICAL:      Other PRN Meds: Yes atarax 25 mg  Med Compliant: Yes   Accu-Chek: No   Oxygen/CPAP/BiPAP: No  CIWA/CINA: No   PAIN Assessment: none  Side Effects from medication: No    Is Patient experiencing any respiratory symptoms (headache, fever, body aches, cough. Nieves Kid ): no  Patient educated by nursing to practice social distancing, wear masks, wash hands frequently: yes      Metabolic Screening:    No results found for: LABA1C    No results found for: CHOL  No results found for: TRIG  No results found for: HDL  No components found for: LDLCAL  No results found for: LABVLDL      Body mass index is 18.49 kg/m². BP Readings from Last 2 Encounters:   02/06/22 117/81   06/16/20 120/63         PSYCH:     SI passive    HI Negative for homicidal ideation        AVH:Absent      Depression: 9 Anxiety: 10       GENERAL:      Appetite: decreased    Social: No   Speech: hesitant   Appearance:appropriately dressed, appropriately groomed and healthy looking  Assistive Devices: none  Level of Assist: Independent      GROUP:    Group Participation: No  Participation LevelNone        Notes: Pt is in his room during this assessment,he is pleasant,,cooperative ,and calm. He says he is homeless ,feels helpless,and sad. Pt says he lived with his grandparents and his parents called him and said he could no longer live there. His grandparents agreed with his parents and he had to leave with no place to go. Pt feels hopeless and helpless. He has no income,no car. Will continue to monitor for safety.

## 2022-02-07 NOTE — PLAN OF CARE
Problem: Depressive Behavior With or Without Suicide Precautions:  Goal: Able to verbalize and/or display a decrease in depressive symptoms  2/7/2022 1604 by Dia Espinosa  Outcome: Ongoing      Group Therapy Note     Date: 2/7/2022  Start Time: 3189  End Time:  1600  Number of Participants: 5     Type of Group: Recovery     Wellness Binder Information  Module Name:  social wellness  Session Number:  1     Patient's Goal:  your social network     Notes:  pt acknowledged developing a positive social network as a coping skill.      Status After Intervention:  Improved     Participation Level: Interactive     Participation Quality: Appropriate, Attentive, and Sharing        Speech:  normal        Thought Process/Content: Logical        Affective Functioning: Congruent        Mood:  congruent        Level of consciousness:  Alert, Oriented x4, and Attentive        Response to Learning: Able to verbalize current knowledge/experience        Endings: None Reported     Modes of Intervention: Education        Discipline Responsible: Psychoeducational Specialist        Signature:  Dia Espinosa

## 2022-02-07 NOTE — PLAN OF CARE
Problem: Discharge Planning:  Goal: Discharged to appropriate level of care  Description: Discharged to appropriate level of care  Outcome: Ongoing     Problem: Self-Esteem - Low:  Goal: Demonstrates positive self-esteem  Description: Demonstrates positive self-esteem  Outcome: Ongoing     Problem: Suicide risk  Description: Suicide risk  Goal: Provide patient with safe environment  Description: Provide patient with safe environment  Outcome: Ongoing     Problem: Depressive Behavior With or Without Suicide Precautions:  Goal: Able to verbalize acceptance of life and situations over which he or she has no control  Description: Able to verbalize acceptance of life and situations over which he or she has no control  2/7/2022 1426 by Yahaira Sandy RN  Outcome: Ongoing  2/7/2022 1135 by Annette Teixeira  Outcome: Ongoing  2/7/2022 1105 by Love Crews  Outcome: Ongoing  Note:                                                                     Group Therapy Note    Date: 2/7/2022  Start Time: 1000  End Time:  1030  Number of Participants: 11    Type of Group: Psychoeducation    Wellness Binder Information  Module Name:  Relapse Prevention  Session Number:  5    Group Goal for Pt: To improve knowledge of relapse prevention strategies    Notes:  Pt demonstrated improved knowledge of relapse prevention strategies by actively participating in group discussion. Status After Intervention:  Unchanged    Participation Level:  Active Listener    Participation Quality: Appropriate and Attentive      Speech:  normal      Thought Process/Content: Logical      Affective Functioning: Congruent      Mood: anxious and depressed      Level of consciousness:  Alert and Oriented x4      Response to Learning: Able to verbalize current knowledge/experience, Able to verbalize/acknowledge new learning, and Progressing to goal      Endings: None Reported    Modes of Intervention: Education      Discipline Responsible: Psychoeducational Specialist      Signature:  Kike Degree    Goal: Able to verbalize and/or display a decrease in depressive symptoms  Description: Able to verbalize and/or display a decrease in depressive symptoms  Outcome: Ongoing  Goal: Ability to disclose and discuss suicidal ideas will improve  Description: Ability to disclose and discuss suicidal ideas will improve  Outcome: Ongoing  Goal: Able to verbalize support systems  Description: Able to verbalize support systems  Outcome: Ongoing  Goal: Absence of self-harm  Description: Absence of self-harm  Outcome: Ongoing  Goal: Patient specific goal  Description: Patient specific goal  Outcome: Ongoing  Goal: Participates in care planning  Description: Participates in care planning  Outcome: Ongoing

## 2022-02-07 NOTE — PROGRESS NOTES
Sexual Activity   Drug Use Not Currently    Comment: Not since 8/2020        Social History     Tobacco Use   Smoking Status Current Every Day Smoker    Packs/day: 1.00    Years: 10.00    Pack years: 10.00   Smokeless Tobacco Never Used        Family History:     Family History   Problem Relation Age of Onset    Other Father             Mental Status:  Level of consciousness:  within normal limits and awake  Appearance:  well-appearing, street clothes, seated in bed, fair grooming and good hygiene  Behavior/Motor:  no abnormalities noted  Attitude toward examiner:  cooperative, attentive and good eye contact  Speech:  normal rate and normal volume  Mood:  \" I am still suicidal.\"  Affect:  mood congruent  Thought processes:  linear and goal directed  Thought content:  Homocidal ideation :denies  Suicidal Ideation:  active  Delusions:  no evidence of delusions  Perceptual Disturbance:  denies any perceptual disturbance  Cognition:  oriented to person, place, and time  Concentration : good  Memory intact for recent and remote  Fund of knowledge:  average  Abstract thinking:  adequate  Insight: limited  Judgment:  good     buPROPion  150 mg Oral Daily    traZODone  50 mg Oral Nightly    nicotine  1 patch TransDERmal Daily       Current Medications:  Current Facility-Administered Medications   Medication Dose Route Frequency Provider Last Rate Last Admin    buPROPion (WELLBUTRIN XL) extended release tablet 150 mg  150 mg Oral Daily Roxana Silver MD   150 mg at 02/07/22 0859    traZODone (DESYREL) tablet 50 mg  50 mg Oral Nightly Roxana Silver MD   50 mg at 02/06/22 2137    acetaminophen (TYLENOL) tablet 650 mg  650 mg Oral Q4H PRN Roxana Silver MD        polyethylene glycol (GLYCOLAX) packet 17 g  17 g Oral Daily PRN Roxana Silver MD        hydrOXYzine (ATARAX) tablet 25 mg  25 mg Oral TID PRN Roxana Silver MD   25 mg at 02/06/22 2137    nicotine (NICODERM CQ) 21 MG/24HR 1 patch  1 patch TransDERmal Daily Ulysses Bynum MD   1 patch at 02/07/22 4990       Psychotherapy:   SUPPORTIVE    DSM V Diagnoses: Active Problems:    Persistent mood (affective) disorder, unspecified ([facility] [address]    Depression  Resolved Problems:    * No resolved hospital problems. *            Plan:    Encourage group therapy  15 minute safety checks  Medical monitoring by Dr. Opal Patricio and associates  Continue current therapy and medications  Social work to obtain collateral     Amount of time spent with patient: 15 minutes with greater than 50% of the time spent in counseling and collaboration of care.     TYRESE Alvarez  Clinician Signature: signed electronically

## 2022-02-07 NOTE — PROGRESS NOTES
Progress Note  Krish Luciano  2/7/2022 5:57 PM  Subjective:   Admit Date:   2/5/2022      CC/ADMIT DX:       Interval History:   Reviewed overnight events and nursing notes. He has no new medical issues. I have reviewed all labs/diagnostics from the last 24hrs. ROS:   I have done a 10 point ROS and all are negative, except what is mentioned in the HPI. ADULT DIET; Regular  ADULT ORAL NUTRITION SUPPLEMENT; Breakfast, Lunch, Dinner; Standard High Calorie/High Protein Oral Supplement    Medications:      vitamin D  50,000 Units Oral Weekly    vitamin B-12  500 mcg Oral Daily    buPROPion  150 mg Oral Daily    traZODone  50 mg Oral Nightly    nicotine  1 patch TransDERmal Daily           Objective:   Vitals: /81   Pulse 77   Temp 98.4 °F (36.9 °C)   Resp 16   Ht 5' 11\" (1.803 m)   Wt 132 lb 9.6 oz (60.1 kg)   SpO2 99%   BMI 18.49 kg/m²  No intake or output data in the 24 hours ending 02/07/22 1757  General appearance: alert and cooperative with exam  Extremities: extremities normal, atraumatic, no cyanosis or edema  Neurologic:  No obvious focal neurologic deficits. Skin: no rashes    Assessment and Plan: Active Problems:    Persistent mood (affective) disorder, unspecified ([address]    Depression  Resolved Problems:    * No resolved hospital problems. *    Vit D Def    Plan:  1. Continue present medication(s)   2. Replace Vit D  3. Follow with Psych      Discharge planning:    home     Reviewed treatment plans with the patient and/or family.              Electronically signed by Denys Euceda MD on 2/7/2022 at 5:57 PM

## 2022-02-07 NOTE — PLAN OF CARE
Problem: Depressive Behavior With or Without Suicide Precautions:  Goal: Able to verbalize acceptance of life and situations over which he or she has no control  2/7/2022 1135 by Yuri Green  Outcome: Ongoing   Group Therapy Note     Date: 2/7/2022  Start Time: 1100  End Time:  1130  Number of Participants: 9     Type of Group: Psychoeducation     Wellness Binder Information  Module Name:  emotional wellness  Session Number:  1     Patient's Goal:  validation of feelings     Notes:  pt acknowledged to have feelings validated I may be necessary to share feeling with others.      Status After Intervention:  Improved     Participation Level: Interactive     Participation Quality: Appropriate, Attentive, and Sharing        Speech:  normal        Thought Process/Content: Logical        Affective Functioning: Congruent        Mood: congruent        Level of consciousness:  Alert, Oriented x4, and Attentive        Response to Learning: Able to verbalize current knowledge/experience        Endings: None Reported     Modes of Intervention: Education        Discipline Responsible: Psychoeducational Specialist        Signature:  Yuri Green

## 2022-02-07 NOTE — PROGRESS NOTES
BHI Daily Shift Assessment  Nursing Progress Note    Room: [facility]-01 Name: Edie Merrill Age: 36 y.o. Ethnicity:  Gender: male   Dx: <principal problem not specified>  Precautions: suicide risk  CPAP: No Accu-Chek: No  MSE:  Status and Exam  Normal: No  Facial Expression: Worried,Flat,Expressionless  Affect: Blunt,Constricted  Level of Consciousness: Alert  Mood:Normal: No  Mood: Depressed,Anxious,Sad,Helpless,Despair  Motor Activity:Normal: No  Motor Activity: Decreased  Interview Behavior: Cooperative  Preception: [place] to Person,[place] to Time,[place] to Place,[place] to Situation  Attention:Normal: No  Attention: Distractible  Thought Processes: Blocking  Thought Content:Normal: No  Thought Content: Poverty of Content  Hallucinations: None  Delusions: Yes  Delusions: Persecution  Memory:Normal: No  Memory: Poor Recent,Poor Remote  Insight and Judgment: No  Insight and Judgment: Poor Judgment,Poor Insight  Present Suicidal Ideation: No  Present Homicidal Ideation: No  Sleep: Yes, Good, no sleep issues Hours Slept: 8 Sched Sleep Meds: Yes PRN Sleep Meds: No Other PRN Meds: No Med Compliant: Yes Appetite: good Percent Meals: 75% Social: No ADLs: No Speech: hesitant Depression: 9 Anxiety: 8    Pt reports good sleep last night. He has a good appetite. He is not social.  He does come out of his room for meals and some groups. He is not interactive. He is cooperative with staff. He denies SI, HI and AVH. Still c/o high ratings of depression and anxiety.     Madhuri Dee RN

## 2022-02-07 NOTE — PROGRESS NOTES
Treatment Team Note:    DOT met with [address] team to discuss Pts [address] plans. Progress/Behavior/Group Attendance: TBD    Target Symptoms/Reason for admission:  male who presents to the emergency department with concern for SI. States he woke up this morning feeling like hanging himself. He has had similar previous feelings in past, most recently requiring admission approx 2 years ago. He tells me he was supposed to be on medication for this, but hasn't been in quite some time. He is homeless. He was picked up in the cold near [place]. He tells me he is from the area. That he has family in the area, and that they know he is homeless. He tells me he does not drink alcohol or use any drugs, ilicit or prescription. He won't really tell me about any increased stressors     Diagnoses: Mood disorder unspecified, Suicidal ideation, Anxiety disorder unspecified  PTSD, chronic, Insomnia unspecified, Methamphetamine use disorder, in remission  Cannabis use disorder, in remission, Alcohol use disorder, in remission, Tobacco use disorder    UDS: Neg    BAL: Neg    AftercarePlan: [address] lives with: Homeless    Collateral obtained from: DOT will meet with pt to gather release of information.   On:    Family Session: TBA    Misc:

## 2022-02-07 NOTE — PLAN OF CARE
Problem: Depressive Behavior With or Without Suicide Precautions:  Goal: Able to verbalize acceptance of life and situations over which he or she has no control  Description: Able to verbalize acceptance of life and situations over which he or she has no control  Outcome: Ongoing  Note:                                                                     Group Therapy Note    Date: 2/7/2022  Start Time: 1000  End Time:  1030  Number of Participants: 11    Type of Group: Psychoeducation    Wellness Binder Information  Module Name:  Relapse Prevention  Session Number:  5    Group Goal for Pt: To improve knowledge of relapse prevention strategies    Notes:  Pt demonstrated improved knowledge of relapse prevention strategies by actively participating in group discussion. Status After Intervention:  Unchanged    Participation Level:  Active Listener    Participation Quality: Appropriate and Attentive      Speech:  normal      Thought Process/Content: Logical      Affective Functioning: Congruent      Mood: anxious and depressed      Level of consciousness:  Alert and Oriented x4      Response to Learning: Able to verbalize current knowledge/experience, Able to verbalize/acknowledge new learning, and Progressing to goal      Endings: None Reported    Modes of Intervention: Education      Discipline Responsible: Psychoeducational Specialist      Signature:  Rambo Garcia

## 2022-02-08 PROBLEM — F15.90 STIMULANT USE DISORDER: Status: RESOLVED | Noted: 2020-06-12 | Resolved: 2022-02-08

## 2022-02-08 PROBLEM — E88.89 KETOSIS (HCC): Status: RESOLVED | Noted: 2020-03-23 | Resolved: 2022-02-08

## 2022-02-08 PROBLEM — F10.11 HISTORY OF ALCOHOL ABUSE: Status: RESOLVED | Noted: 2020-03-23 | Resolved: 2022-02-08

## 2022-02-08 PROBLEM — F19.951: Status: RESOLVED | Noted: 2018-04-29 | Resolved: 2022-02-08

## 2022-02-08 PROBLEM — R45.851 SUICIDAL IDEATION: Status: RESOLVED | Noted: 2020-06-12 | Resolved: 2022-02-08

## 2022-02-08 PROBLEM — F32.A DEPRESSION: Status: RESOLVED | Noted: 2022-02-05 | Resolved: 2022-02-08

## 2022-02-08 PROBLEM — G47.09 OTHER INSOMNIA: Chronic | Status: ACTIVE | Noted: 2022-02-08

## 2022-02-08 PROBLEM — F10.20 ALCOHOL USE DISORDER, SEVERE, DEPENDENCE (HCC): Status: RESOLVED | Noted: 2020-06-12 | Resolved: 2022-02-08

## 2022-02-08 PROBLEM — F29 PSYCHOSIS (HCC): Status: RESOLVED | Noted: 2020-03-23 | Resolved: 2022-02-08

## 2022-02-08 PROCEDURE — 6370000000 HC RX 637 (ALT 250 FOR IP): Performed by: PSYCHIATRY & NEUROLOGY

## 2022-02-08 PROCEDURE — 6370000000 HC RX 637 (ALT 250 FOR IP): Performed by: FAMILY MEDICINE

## 2022-02-08 PROCEDURE — 99231 SBSQ HOSP IP/OBS SF/LOW 25: CPT | Performed by: NURSE PRACTITIONER

## 2022-02-08 PROCEDURE — 1240000000 HC EMOTIONAL WELLNESS R&B

## 2022-02-08 RX ORDER — TRAZODONE HYDROCHLORIDE 50 MG/1
50 TABLET ORAL NIGHTLY
Qty: 30 TABLET | Refills: 1 | Status: SHIPPED | OUTPATIENT
Start: 2022-02-08 | End: 2022-02-10

## 2022-02-08 RX ORDER — BUPROPION HYDROCHLORIDE 150 MG/1
150 TABLET ORAL DAILY
Qty: 30 TABLET | Refills: 1 | Status: SHIPPED | OUTPATIENT
Start: 2022-02-09 | End: 2022-02-10

## 2022-02-08 RX ADMIN — TRAZODONE HYDROCHLORIDE 50 MG: 50 TABLET ORAL at 20:51

## 2022-02-08 RX ADMIN — CYANOCOBALAMIN TAB 500 MCG 500 MCG: 500 TAB at 08:34

## 2022-02-08 RX ADMIN — BUPROPION HYDROCHLORIDE 150 MG: 150 TABLET, EXTENDED RELEASE ORAL at 08:34

## 2022-02-08 RX ADMIN — HYDROXYZINE HYDROCHLORIDE 25 MG: 25 TABLET ORAL at 21:02

## 2022-02-08 NOTE — PLAN OF CARE
Problem: Depressive Behavior With or Without Suicide Precautions:  Goal: Able to verbalize acceptance of life and situations over which he or she has no control  Description: Able to verbalize acceptance of life and situations over which he or she has no control  2/8/2022 1039 by Attila Atkins  Outcome: Ongoing  Note:                                                                     Group Therapy Note    Date: 2/8/2022  Start Time: 1000  End Time:  1030  Number of Participants: 6    Type of Group: Psychoeducation    Wellness Binder Information  Module Name:  [address] [place] [address]  Session Number:  1    Group Goal for Pt: To improve knowledge of practical facts about depression    Notes:  Pt demonstrated improved knowledge of practical facts about depression by actively participating in group activity. Status After Intervention:  Unchanged    Participation Level:  Active Listener and Interactive    Participation Quality: Appropriate and Attentive      Speech:  normal      Thought Process/Content: Logical      Affective Functioning: Congruent      Mood: anxious and depressed      Level of consciousness:  Alert and Oriented x4      Response to Learning: Able to verbalize current knowledge/experience, Able to verbalize/acknowledge new learning, and Progressing to goal      Endings: None Reported    Modes of Intervention: Education      Discipline Responsible: Psychoeducational Specialist      Signature:  Attila Atkins

## 2022-02-08 NOTE — PROGRESS NOTES
CLINICAL PHARMACY NOTE: MEDS TO BEDS    Total # of Prescriptions Filled: 2   The following medications were delivered to the patient:  · Trazodone 50 mg  · Bupropion  mg    Additional Documentation:    Handed scripts to Ada Gillespie) at nurses station

## 2022-02-08 NOTE — PROGRESS NOTES
Treatment Team Note:     DOT met with [address] team to discuss Pts [place] and [place] plans.      Progress/Behavior/Group Attendance: TBD     Target Symptoms/Reason for admission:  male who presents to the emergency department with concern for SI. States he woke up this morning feeling like hanging himself. He has had similar previous feelings in past, most recently requiring admission approx 2 years ago. He tells me he was supposed to be on medication for this, but hasn't been in quite some time. He is homeless. He was picked up in the cold near [place]. He tells me he is from the area. That he has family in the area, and that they know he is homeless. He tells me he does not drink alcohol or use any drugs, ilicit or prescription.  He won't really tell me about any increased stressors      Diagnoses: Mood disorder unspecified, Suicidal ideation, Anxiety disorder unspecified  PTSD, chronic, Insomnia unspecified, Methamphetamine use disorder, in remission  Cannabis use disorder, in remission, Alcohol use disorder, in remission, Tobacco use disorder     UDS: Neg     BAL: Neg     AftercarePlan: [address]     Pt lives with: with his grandmother      Collateral obtained from: sister  On:2/8/22     Family Session: JOSE MIGUEL     Misc:

## 2022-02-08 NOTE — PROGRESS NOTES
Collateral obtained from: Patients sister Walker Hendrix [phone]    Immediate Stressors & Time Episode Began: Patient has always had mental problems and often lashed out at people. Patient was living with his grandmother and started yelling at her and his parents called the police. Patient may not be able to go back to the hospital.  Patient is currently on probation for previous charges. Patient was recently in alf for a year for assaulting a  and for possession of drugs. Patient has been prescribed medication on the past but isnt taking medication now. Diagnosis/Hx of compliance with meds: Not taking medication. Tx Hx/Past hospitalizations:  No previous admissions    Family hx of psychiatric issues: Patient mom has been diagnosed with Bipolar and his sister. Patients father completed suicide when the patient was 15years old. Substance Abuse: Patient has abused Meth and alcohol in the past. Patient has been to [facility] in the past and was able stay sober for 6 month and patient went 5 years ago. Pending Legal:     Safety Issues (Weapons? Hx of attempts): No issues    Support system/Medication Managed by: The importance of medication management and locking extra medication in a secured location was explained and reccommended to collateral.     Additional Info: Patient parents are trying to figure to out where he is going.

## 2022-02-08 NOTE — PLAN OF CARE
Problem: Depressive Behavior With or Without Suicide Precautions:  Goal: Able to verbalize and/or display a decrease in depressive symptoms  2/8/2022 1138 by Ceferino Reis  Outcome: Ongoing      Group Therapy Note     Date: 2/8/2022  Start Time: 1100  End Time:  1130  Number of Participants: 4     Type of Group: Psychoeducation     Wellness Binder Information  Module Name:  staying well  Session Number:  1     Patient's Goal:  daily maintenance and coping skills     Notes: pt acknowledged use of positive coping skills daily to help stay well.      Status After Intervention:  Improved     Participation Level: Interactive     Participation Quality: Appropriate, Attentive, and Sharing        Speech:  normal        Thought Process/Content: Logical        Affective Functioning: Congruent        Mood: congruent        Level of consciousness:  Alert, Oriented x4, and Attentive        Response to Learning: Able to verbalize current knowledge/experience        Endings: None Reported     Modes of Intervention: Education        Discipline Responsible: Psychoeducational Specialist        Signature:  Ceferino Reis

## 2022-02-08 NOTE — PROGRESS NOTES
[address] [place] [address]      Psychiatric Progress Note    Name:  Laurinda Eisenmenger  Date:  2/8/2022  Age:  36 y.o. Sex:  male  Ethnicity:   Primary Care Physician:  No primary care provider on file. Patient Care Team:  No care team member to display  Chief Complaint: \" I am still having suicidal thoughts. \"        Historian:patient  Complaint Type: anxiety, decreased appetite, depression, fatigue, illegal drug usage, sleep disturbance and tobacco use  Course of Symptoms: ongoing  Precipitating Factors: substance abuse        Subjective  Nursing notes were reviewed and patient had no behavioral issues during the night. No as needed medications were administered during the night. Today he continues to endorse suicidal ideation. Reports he is having thoughts to hang himself. He denies homicidal ideation and psychosis. He reports that he has been out of his room more today. Social work was able to obtain collateral from his sister who reports that he was kicked out of his grandparents home because he was yelling and screaming at the grandparents. Patient reports sleep as, \"It was off and on. \" Patient has been calm and cooperative with staff and peers. Patient has been compliant with medications. Patient has been attending groups. Patient reports appetite as \"it is normal.\" Patient reports no side effects from medications. Objective  Vitals:    02/08/22 0800   BP: 115/78   Pulse: 81   Resp: 18   Temp: 97.9 °F (36.6 °C)   SpO2: 99%       Previous Psychiatric/Substance Use History      Medical History:  Past Medical History:   Diagnosis Date    Alcohol abuse     Bipolar 1 disorder (HCC)     Post traumatic stress disorder (PTSD)         BLACK History:   Social History     Substance and Sexual Activity   Alcohol Use Not Currently    Comment: Has not partook in ETOH since approx.  8/2020         Social History     Substance and Sexual Activity   Drug Use Not Currently    Comment: Not since 8/2020 Social History     Tobacco Use   Smoking Status Current Every Day Smoker    Packs/day: 1.00    Years: 10.00    Pack years: 10.00   Smokeless Tobacco Never Used        Family History:     Family History   Problem Relation Age of Onset    Other Father             Mental Status:  Level of consciousness:  within normal limits and awake  Appearance:  well-appearing, street clothes, in chair, good grooming and good hygiene  Behavior/Motor:  no abnormalities noted  Attitude toward examiner:  cooperative, attentive and good eye contact  Speech:  normal rate and normal volume   Mood:  \"I am still having bad thoughts. \"  Affect:  mood congruent  Thought processes:  linear and goal directed  Thought content:  Homocidal ideation :denies  Suicidal Ideation:  Endorses suicidal ideation  Delusions:  no evidence of delusions  Perceptual Disturbance:  denies any perceptual disturbance  Cognition:  oriented to person, place, and time  Concentration : good  Memory intact for recent and remote  Fund of knowledge:  average  Abstract thinking: adequate  Insight: good  Judgment:  good     vitamin D  50,000 Units Oral Weekly    vitamin B-12  500 mcg Oral Daily    buPROPion  150 mg Oral Daily    traZODone  50 mg Oral Nightly    nicotine  1 patch TransDERmal Daily       Current Medications:  Current Facility-Administered Medications   Medication Dose Route Frequency Provider Last Rate Last Admin    vitamin D (ERGOCALCIFEROL) capsule 50,000 Units  50,000 Units Oral Weekly Vaishnavi Edwards MD   50,000 Units at 02/07/22 1713    vitamin B-12 (CYANOCOBALAMIN) tablet 500 mcg  500 mcg Oral Daily Vaishnavi Edwards MD   500 mcg at 02/08/22 0834    buPROPion (WELLBUTRIN XL) extended release tablet 150 mg  150 mg Oral Daily Brittany Corado MD   150 mg at 02/08/22 0834    traZODone (DESYREL) tablet 50 mg  50 mg Oral Nightly Brittany Corado MD   50 mg at 02/07/22 0923    acetaminophen (TYLENOL) tablet 650 mg  650 mg Oral Q4H PRN

## 2022-02-08 NOTE — PROGRESS NOTES
[facility] Adult Unit Daily Assessment  Nursing Progress Note    Room: [facility]/612-01   Name: Lorraine Martino   Age: 36 y.o. Gender: male   Dx: Persistent mood (affective) disorder, unspecified (HCC)  Precautions: suicide risk  Inpatient Status: voluntary       SLEEP:    Sleep Quality Fair  Sleep Medications:    PRN Sleep Meds:        MEDICAL:    Other PRN Meds:    Med Compliant:   Accu-Chek:   Oxygen/CPAP/BiPAP:   CIWA/CINA:    PAIN Assessment: none  Side Effects from medication: No    Is Patient experiencing any respiratory symptoms (headache, fever, body aches, cough. Clement Fly ): no  Patient educated by nursing to practice social distancing, wear masks, wash hands frequently: yes      PSYCH:    Depression: same   Anxiety: same   SI with plan to hang self  HI Negative for homicidal ideation      AVH:Absent      GENERAL:    Appetite: good    Social: No   Speech: normal   Appearance: appropriately dressed and good hygiene    GROUP:    Group Participation: Yes  Participation Quality: Minimal    Notes: patient is in the day area for breakfast and group. Affect is flat. He has been in and out of his room this morning, He does not socialize with other patients. He met with his provider. There are no further orders at this time. He reports poor sleep last night, waking up off and on all night. Patient given a safety plan to fill out. Patient's mother called and talked to this writer and reports that he cant come home after discharge. She reports he tried once to Fei my . \" He came out for dinner and ate alone. I talked to him briefly. He talked in a soft, polite manner referred to me as ma'am. He reports he thinks he may not have a place to live. He is on the phone talking to his sister.          .Electronically signed by Sarbjit Ramírez RN on 2/8/22 at 5:26 PM CST

## 2022-02-08 NOTE — PROGRESS NOTES
Group Note    Number of Participants in Group: 5  Number of Patients on Unit:8      Patient attended group:Yes  Reason for Absence:  Intervention for patient absence:        Type of Group:   Wrap-Up/Relaxation    Patient's Goal: See wrap up group sheet    Participation Level:  Complete his paper out in his room           Patient Response to Learning: Yes    Patient's Behavior: Anxious and Withdrawn    Is Patient Social/Interacting: No    Relaxation:   Television:No   Reading:No   Game/Puzzle:No   Phone: No       Notes/Comments:      Please see patient's wrap up group sheet for patient's comments       Electronically signed by Raul Dong RN on 2/8/22 at 12:13 AM CST

## 2022-02-08 NOTE — PROGRESS NOTES
[facility] Adult Unit Daily Assessment  Nursing Progress Note    Room: [address] [place] [address]   Name: Epifanio Funes   Age: 36 y.o. Gender: male   Dx: <principal problem not specified>  Precautions: suicide risk  Inpatient Status: voluntary       SLEEP:    Sleep Quality Good  Sleep Medications: Yes , trazodone 50mg  PRN Sleep Meds: No       MEDICAL:    Other PRN Meds: No   Med Compliant: Yes  Accu-Chek: No  Oxygen/CPAP/BiPAP: No  CIWA/CINA: No   PAIN Assessment: none  Side Effects from medication: No    Is Patient experiencing any respiratory symptoms (headache, fever, body aches, cough. Eulalio Evans ): no  Patient educated by nursing to practice social distancing, wear masks, wash hands frequently: yes      PSYCH:    Depression: 8   Anxiety: 8   SI denies suicidal ideation   HI Negative for homicidal ideation      AVH:Absent      GENERAL:    Appetite: good    Social: No   Speech: normal   Appearance: appropriately dressed and healthy looking    GROUP:    Group Participation: Yes  Participation Quality: Interactive    Notes: Patient isolated himself in his room most of the evening. Patient was not social. Patient was withdrawn, low concentration, flat facial expression, anxious, and poor eye contact during the assessment. Patient stated he was homeless and no income. Continue to monitor for safety.          Electronically signed by Heath Solis RN on 2/8/22 at 12:05 AM CST

## 2022-02-09 PROCEDURE — 6370000000 HC RX 637 (ALT 250 FOR IP): Performed by: PSYCHIATRY & NEUROLOGY

## 2022-02-09 PROCEDURE — 99231 SBSQ HOSP IP/OBS SF/LOW 25: CPT | Performed by: NURSE PRACTITIONER

## 2022-02-09 PROCEDURE — 6370000000 HC RX 637 (ALT 250 FOR IP): Performed by: FAMILY MEDICINE

## 2022-02-09 PROCEDURE — 1240000000 HC EMOTIONAL WELLNESS R&B

## 2022-02-09 RX ADMIN — BUPROPION HYDROCHLORIDE 150 MG: 150 TABLET, EXTENDED RELEASE ORAL at 08:26

## 2022-02-09 RX ADMIN — TRAZODONE HYDROCHLORIDE 50 MG: 50 TABLET ORAL at 22:02

## 2022-02-09 RX ADMIN — CYANOCOBALAMIN TAB 500 MCG 500 MCG: 500 TAB at 08:26

## 2022-02-09 ASSESSMENT — PAIN SCALES - GENERAL: PAINLEVEL_OUTOF10: 0

## 2022-02-09 NOTE — PROGRESS NOTES
[facility] Adult Unit Daily Assessment  Nursing Progress Note    Room: [facility]/612-01   Name: Darcy Araiza   Age: 36 y.o. Gender: male   Dx: Persistent mood (affective) disorder, unspecified (HCC)  Precautions: suicide risk  Inpatient Status: voluntary       SLEEP:    Sleep Quality Good  Sleep Medications: Yes , trazodone 50 mg  PRN Sleep Meds: No       MEDICAL:    Other PRN Meds: No   Med Compliant: Yes  Accu-Chek: No  Oxygen/CPAP/BiPAP: No  CIWA/CINA: No   PAIN Assessment: none  Side Effects from medication: No    Is Patient experiencing any respiratory symptoms (headache, fever, body aches, cough. [place] Manley ): no  Patient educated by nursing to practice social distancing, wear masks, wash hands frequently: yes      PSYCH:    Depression: 10   Anxiety: 8   SI denies suicidal ideation   HI Negative for homicidal ideation      AVH: auditory      GENERAL:    Appetite: good    Social: No   Speech: normal   Appearance: appropriately dressed and healthy looking    GROUP:    Group Participation: Yes  Participation Quality: Interactive    Notes: Patient was in the dayroom eating a snack. Patient stated that he did not know why his parent asked him to leave. Patient reported that he is planning on going to the [facility] house after discharge. Continue to monitor for safety.         Electronically signed by Katherin Cazares RN on 2/9/22 at 4:14 AM CST

## 2022-02-09 NOTE — PLAN OF CARE
Problem: Depressive Behavior With or Without Suicide Precautions:  Goal: Ability to disclose and discuss suicidal ideas will improve  2/9/2022 1138 by Megan Joy  Outcome: Ongoing   Group Therapy Note     Date: 2/9/2022  Start Time: 1100  End Time:  1130  Number of Participants: 4     Type of Group: Psychoeducation     Wellness Binder Information  Module Name:  staying well  Session Number:  1     Patient's Goal:  daily maintenance coping skills     Notes:  Pt acknowledged use of positive coping skills daily to help stay well.      Status After Intervention:  Improved     Participation Level: Interactive     Participation Quality: Appropriate, Attentive, and Sharing        Speech:  normal        Thought Process/Content: Logical        Affective Functioning: Congruent        Mood: congruent        Level of consciousness:  Alert, Oriented x4, and Attentive        Response to Learning: Able to verbalize current knowledge/experience        Endings: None Reported     Modes of Intervention: Education        Discipline Responsible: Psychoeducational Specialist        Signature:  Megan Joy

## 2022-02-09 NOTE — PLAN OF CARE
Problem: Discharge Planning:  Goal: Discharged to appropriate level of care  2/9/2022 0939 by Kesha Loera RN  Outcome: Ongoing  2/9/2022 0353 by Yousuf Maldonado RN  Outcome: Ongoing     Problem: Self-Esteem - Low:  Goal: Demonstrates positive self-esteem  2/9/2022 0939 by Kesha Loera RN  Outcome: Ongoing  2/9/2022 0353 by Yousuf Maldonado RN  Outcome: Ongoing     Problem: Suicide risk  Goal: Provide patient with safe environment  2/9/2022 0939 by Kesha Loera RN  Outcome: Ongoing  2/9/2022 0353 by Yousuf Maldonado RN  Outcome: Ongoing     Problem: Depressive Behavior With or Without Suicide Precautions:  Goal: Able to verbalize acceptance of life and situations over which he or she has no control  2/9/2022 0939 by Kesha Loera RN  Outcome: Ongoing  2/9/2022 0353 by Yousuf Maldonado RN  Outcome: Ongoing  Goal: Able to verbalize and/or display a decrease in depressive symptoms  2/9/2022 0939 by Kesha Loera RN  Outcome: Ongoing  2/9/2022 0353 by Yousuf Maldonado RN  Outcome: Ongoing  Goal: Ability to disclose and discuss suicidal ideas will improve  2/9/2022 0939 by Kesha Loera RN  Outcome: Ongoing  2/9/2022 0353 by Yousuf Maldonado RN  Outcome: Ongoing  Goal: Able to verbalize support systems  2/9/2022 0939 by Kesha Loera RN  Outcome: Ongoing  2/9/2022 0353 by Yousuf Maldonado RN  Outcome: Ongoing  Goal: Absence of self-harm  2/9/2022 0939 by Kesha Loera RN  Outcome: Ongoing  2/9/2022 0353 by Yousuf Maldonado RN  Outcome: Ongoing  Goal: Patient specific goal  2/9/2022 5976 by Kesha Loera RN  Outcome: Ongoing  2/9/2022 0353 by Yousuf Maldonado RN  Outcome: Ongoing  Goal: Participates in care planning  2/9/2022 0939 by Kesha Loera RN  Outcome: Ongoing  2/9/2022 0353 by Yousuf Maldonado RN  Outcome: Ongoing     Problem: Pain:  Goal: Pain level will decrease  Outcome: Ongoing  Goal: Control of acute pain  Outcome: Ongoing  Goal: Control of chronic pain  Outcome: Ongoing

## 2022-02-09 NOTE — PROGRESS NOTES
[facility] Daily Shift Assessment  Nursing Progress Note    Room: 06/612-01 Name: Paddy Chapa Age: 36 y.o. Gender: male   Dx: Persistent mood (affective) disorder, unspecified (HCC)  Precautions: suicide risk  Target Symptoms:   Accu-Chek: NoSleep: Yes,Sleep Quality Good SI No AVH denies [address] [place] [address]  ADLs: No Speech: normal Depression: 6 Anxiety: 7   Participation LevelActive Listener  Appetite: fair  Respiratory symptoms: No Headache: No Body aches: No Fever: No Cough: No  Patients encouraged to wear masks, wash hands frequently and practice social distancing while on the unit: Yes  Visitation: No   Participation QualityAppropriate and Attentive    Complaints:none    Notes: Patient is alert and oriented x 4. Pleasant, calm and cooperative. Wearing casual attire. Appearance is appropriate. Grooming is fair. Compliant with medications. Appetite is fair. Reports good sleep. Not social but attending groups. Affect is congruent. Though processes are linea and goal directed.      Signature: Electronically signed by Ivan Fragoso RN on 2/9/22 at 11:23 AM CST

## 2022-02-09 NOTE — PROGRESS NOTES
Progress Note  Abdi Carson  2/8/2022 11:10 PM  Subjective:   Admit Date:   2/5/2022      CC/ADMIT DX:       Interval History:   Reviewed overnight events and nursing notes. He denies any physical complaints. I have reviewed all labs/diagnostics from the last 24hrs. ROS:   I have done a 10 point ROS and all are negative, except what is mentioned in the HPI. ADULT DIET; Regular  ADULT ORAL NUTRITION SUPPLEMENT; Breakfast, Lunch, Dinner; Standard High Calorie/High Protein Oral Supplement    Medications:      vitamin D  50,000 Units Oral Weekly    vitamin B-12  500 mcg Oral Daily    buPROPion  150 mg Oral Daily    traZODone  50 mg Oral Nightly    nicotine  1 patch TransDERmal Daily           Objective:   Vitals: /83   Pulse 77   Temp 98.1 °F (36.7 °C) (Temporal)   Resp 20   Ht 5' 11\" (1.803 m)   Wt 132 lb 9.6 oz (60.1 kg)   SpO2 92%   BMI 18.49 kg/m²  No intake or output data in the 24 hours ending 02/08/22 2310  General appearance: alert and cooperative with exam  Extremities: extremities normal, atraumatic, no cyanosis or edema  Neurologic:  No obvious focal neurologic deficits. Skin: no rashes    Assessment and Plan:   Principal Problem:    Persistent mood (affective) disorder, unspecified (HCC)  Active Problems:    Anxiety disorder, unspecified    Tobacco use disorder    Other insomnia  Resolved Problems:    Depression    Vit D Def    Plan:  1. Continue present medication(s)   2. He is medically stable. I will monitor for any changes or concerns. 3.  Follow with Psych      Discharge planning:    home     Reviewed treatment plans with the patient and/or family.              Electronically signed by Artur Buck MD on 2/8/2022 at 11:10 PM

## 2022-02-09 NOTE — PROGRESS NOTES
SW faxed patient referral to [facility] for evaluation for admission/ DOT spoke with Cory Watson who confirmed that patient has been accepted for admission

## 2022-02-09 NOTE — PROGRESS NOTES
[address] [place] [address]      Psychiatric Progress Note    Name:  Lucille Irvin  Date:  2/9/2022  Age:  36 y.o. Sex:  male  Ethnicity:   Primary Care Physician:  No primary care provider on file. Patient Care Team:  No care team member to display  Chief Complaint: \" I am doing good. \"        Historian:patient  Complaint Type: anxiety, decreased appetite, depression, illegal drug usage, loss of interest in favorite activities, sleep disturbance and tobacco use  Course of Symptoms: improved  Precipitating Factors: stimulant use disorder       Subjective  Nursing notes were reviewed and patient had no behavioral issues during the night. As needed medications administered include Hydroxyzine. Today he denies suicidal ideation, homicidal ideation and psychosis. He reported that he would like to go to sober living home however they are all currently full at this time. He reported that he would go to [facility]. His grandmother will not allow him to return to their home. Today he also reports that he has to report to his  after he leaves treatment. Patient reports sleep as  \"it has been really good. \" Patient has been calm and cooperative with staff and peers. Patient has been compliant with medications. Patient has been attending groups. Patient reports appetite as \"it is fine. \" Patient reports no side effects from medications. Objective  Vitals:    02/08/22 2015   BP: 136/83   Pulse: 77   Resp: 20   Temp: 98.1 °F (36.7 °C)   SpO2: 92%       Previous Psychiatric/Substance Use History      Medical History:  Past Medical History:   Diagnosis Date    Alcohol abuse     Bipolar 1 disorder (HCC)     Post traumatic stress disorder (PTSD)         BLACK History:   Social History     Substance and Sexual Activity   Alcohol Use Not Currently    Comment: Has not partook in ETOH since approx.  8/2020         Social History     Substance and Sexual Activity   Drug Use Not Currently    Comment: Not since 8/2020        Social History     Tobacco Use   Smoking Status Current Every Day Smoker    Packs/day: 1.00    Years: 10.00    Pack years: 10.00   Smokeless Tobacco Never Used        Family History:     Family History   Problem Relation Age of Onset    Other Father             Mental Status:  Level of consciousness:  within normal limits and awake  Appearance:  well-appearing, street clothes, in chair, good grooming and good hygiene  Behavior/Motor:  no abnormalities noted  Attitude toward examiner:  cooperative, attentive and good eye contact  Speech:  normal rate and normal volume  Mood:  \" I am ok I guess. \"  Affect:  mood congruent  Thought processes:  linear and goal directed  Thought content:  Homocidal ideation: denies  Suicidal Ideation:  denies suicidal ideation  Delusions:  no evidence of delusions  Perceptual Disturbance:  denies any perceptual disturbance  Cognition:  oriented to person, place, and time  Concentration : good  Memory intact for recent and remote  Fund of knowledge:  average  Abstract thinking:  adequate  Insight: improved  Judgment:  good     vitamin D  50,000 Units Oral Weekly    vitamin B-12  500 mcg Oral Daily    buPROPion  150 mg Oral Daily    traZODone  50 mg Oral Nightly    nicotine  1 patch TransDERmal Daily       Current Medications:  Current Facility-Administered Medications   Medication Dose Route Frequency Provider Last Rate Last Admin    vitamin D (ERGOCALCIFEROL) capsule 50,000 Units  50,000 Units Oral Weekly Evan Strauss MD   50,000 Units at 02/07/22 1713    vitamin B-12 (CYANOCOBALAMIN) tablet 500 mcg  500 mcg Oral Daily Evan Strauss MD   500 mcg at 02/09/22 0826    buPROPion (WELLBUTRIN XL) extended release tablet 150 mg  150 mg Oral Daily Mansi Hooper MD   150 mg at 02/09/22 0826    traZODone (DESYREL) tablet 50 mg  50 mg Oral Nightly Mansi Hooper MD   50 mg at 02/08/22 2051    acetaminophen (TYLENOL) tablet 650 mg  650 mg Oral Q4H PRN Adela Merchant MD        polyethylene glycol (GLYCOLAX) packet 17 g  17 g Oral Daily PRN Adela Merchant MD        hydrOXYzine (ATARAX) tablet 25 mg  25 mg Oral TID PRN Adela Merchant MD   25 mg at 02/08/22 2102    nicotine (NICODERM CQ) 21 MG/24HR 1 patch  1 patch TransDERmal Daily Adela Merchant MD   1 patch at 02/09/22 0827       Psychotherapy:   SUPPORTIVE    DSM V Diagnoses:    Principal Problem:    Persistent mood (affective) disorder, unspecified ([facility] [address]  Active Problems:    Anxiety disorder, unspecified    Tobacco use disorder    Other insomnia  Resolved Problems:    Depression            Plan:    Encourage group therapy  15 minute safety checks  Medical monitoring by Dr. Shaneka Rice and associates  Continue current therapy and medications  Will discharge to [facility] tomorrow     Amount of time spent with patient:  15 minutes with greater than 50% of the time spent in counseling and collaboration of care.     TYRESE Rivera  Clinician Signature: signed electronically

## 2022-02-10 VITALS
WEIGHT: 132.6 LBS | DIASTOLIC BLOOD PRESSURE: 83 MMHG | HEIGHT: 71 IN | HEART RATE: 91 BPM | TEMPERATURE: 96.7 F | RESPIRATION RATE: 20 BRPM | BODY MASS INDEX: 18.56 KG/M2 | OXYGEN SATURATION: 100 % | SYSTOLIC BLOOD PRESSURE: 99 MMHG

## 2022-02-10 PROCEDURE — 5130000000 HC BRIDGE APPOINTMENT

## 2022-02-10 PROCEDURE — 99238 HOSP IP/OBS DSCHRG MGMT 30/<: CPT | Performed by: NURSE PRACTITIONER

## 2022-02-10 PROCEDURE — 6370000000 HC RX 637 (ALT 250 FOR IP): Performed by: PSYCHIATRY & NEUROLOGY

## 2022-02-10 PROCEDURE — 6370000000 HC RX 637 (ALT 250 FOR IP): Performed by: FAMILY MEDICINE

## 2022-02-10 RX ORDER — BUPROPION HYDROCHLORIDE 150 MG/1
150 TABLET ORAL DAILY
Qty: 30 TABLET | Refills: 0 | Status: ON HOLD | OUTPATIENT
Start: 2022-02-10 | End: 2022-04-19 | Stop reason: HOSPADM

## 2022-02-10 RX ORDER — TRAZODONE HYDROCHLORIDE 50 MG/1
50 TABLET ORAL NIGHTLY PRN
Qty: 30 TABLET | Refills: 1 | Status: ON HOLD | OUTPATIENT
Start: 2022-02-10 | End: 2022-04-19 | Stop reason: HOSPADM

## 2022-02-10 RX ADMIN — CYANOCOBALAMIN TAB 500 MCG 500 MCG: 500 TAB at 10:00

## 2022-02-10 RX ADMIN — BUPROPION HYDROCHLORIDE 150 MG: 150 TABLET, EXTENDED RELEASE ORAL at 10:00
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
## 2022-02-10 NOTE — BH NOTE
Group Therapy Note    Date: 2/09/2022  Start Time: 1330  End Time:  1400  Number of Participants: 3    Type of Group: Spirituality    Wellness Binder Information  Module Name:  Mindfulness  Session Number:      Patient's Goal:  To rest the mind through breathing and other senses     Notes:      Status After Intervention:  Improved    Participation Level: Interactive    Participation Quality: Appropriate and Attentive      Speech:  normal      Thought Process/Content:       Affective Functioning: Congruent      Mood: euthymic, calm      Level of consciousness:  Attentive      Response to Learning: Able to verbalize current knowledge/experience and Capable of insight      Endings:     Modes of Intervention: Education and Activity      Discipline Responsible:       Signature:  Marianna Alonso  [facility]

## 2022-02-10 NOTE — PROGRESS NOTES
Progress Note  Nayeli Sanchez  2/9/2022 10:45 PM  Subjective:   Admit Date:   2/5/2022      CC/ADMIT DX:       Interval History:   Reviewed overnight events and nursing notes. He has no medical issues. I have reviewed all labs/diagnostics from the last 24hrs. ROS:   I have done a 10 point ROS and all are negative, except what is mentioned in the HPI. ADULT DIET; Regular  ADULT ORAL NUTRITION SUPPLEMENT; Breakfast, Lunch, Dinner; Standard High Calorie/High Protein Oral Supplement    Medications:      vitamin D  50,000 Units Oral Weekly    vitamin B-12  500 mcg Oral Daily    buPROPion  150 mg Oral Daily    traZODone  50 mg Oral Nightly    nicotine  1 patch TransDERmal Daily           Objective:   Vitals: /73   Pulse 87   Temp 98.2 °F (36.8 °C) (Oral)   Resp 18   Ht 5' 11\" (1.803 m)   Wt 132 lb 9.6 oz (60.1 kg)   SpO2 100%   BMI 18.49 kg/m²  No intake or output data in the 24 hours ending 02/09/22 2885  General appearance: alert and cooperative with exam  Extremities: extremities normal, atraumatic, no cyanosis or edema  Neurologic:  No obvious focal neurologic deficits. Skin: no rashes    Assessment and Plan:   Principal Problem:    Persistent mood (affective) disorder, unspecified (HCC)  Active Problems:    Anxiety disorder, unspecified    Tobacco use disorder    Other insomnia  Resolved Problems:    Depression    Vit D Def    Plan:  1. Continue present medication(s)   2.  He remains medically stable. I will monitor for any changes or concerns. 3.  Follow with Psych      Discharge planning:    home     Reviewed treatment plans with the patient and/or family.              Electronically signed by Freedom Arriaza MD on 2/9/2022 at 10:45 PM

## 2022-02-10 NOTE — PROGRESS NOTES
[facility] Adult Unit Daily Assessment  Nursing Progress Note    Room: [facility]/612-01   Name: Nida Vasquez   Age: 36 y.o. Gender: male   Dx: Persistent mood (affective) disorder, unspecified (HCC)  Precautions: suicide risk  Inpatient Status: voluntary       SLEEP:    Sleep Quality Good  Sleep Medications: Yes   PRN Sleep Meds: No       MEDICAL:    Other PRN Meds: No   Med Compliant: Yes  Accu-Chek: No  Oxygen/CPAP/BiPAP: No  CIWA/CINA: No   PAIN Assessment: none  Side Effects from medication: No    Is Patient experiencing any respiratory symptoms (headache, fever, body aches, cough. Donzell Pyo ): no  Patient educated by nursing to practice social distancing, wear masks, wash hands frequently: yes      PSYCH:    Depression: 3   Anxiety: 4   SI denies suicidal ideation   HI Negative for homicidal ideation      AVH:Absent      GENERAL:    Appetite: good    Social: Yes   Speech: normal   Appearance: appropriately dressed and healthy looking    GROUP:     Group Participation: Yes  Participation Quality: Active Listener, Interactive and Minimal    Notes:     Patient is cooperative, Alert and Oriented x4, appears Withdrawn. Patient Rates Depression a 3 and Anxiety a 4 on a 0-10 scale, with 10 being the worst. Patient affect is Congruent and Flat. Exhibited a flat/sad facial expression; maintained good  eye contact throughout interview. Patient denies having current [address]. Patient is compliant with medications and group. No signs of distress noted; Patient observed getting ready for bed after interview.      Electronically signed by Rocio Bey RN on 2/10/22 at 12:26 AM CST

## 2022-02-10 NOTE — PROGRESS NOTES
Group Therapy Note    Start Time: 804  End Time:  650  Number of Participants: 8    Type of Group: Community Meeting       Patient's Goal:  treatment      Notes:      Participation Level:  Active Listener       Participation Quality: Appropriate      Thought Process/Content: Logical      Affective Functioning: Congruent      Mood: calm      Level of consciousness:  Alert      Modes of Intervention: Support      Discipline Responsible: Behavioral Health Tech II      Signature:  Donovan Melendez

## 2022-02-10 NOTE — PROGRESS NOTES
Discharge Note     Pt discharging on this date. Pt denies SI, HI, and AVH at this time. Pt reports improvement in behavior and is leaving unit in overall good condition. SW and pt discussed pt's follow up appointments and importance of attending appointments as scheduled, pt voiced understanding and agreement. Pt and SW also discussed pt safety plan and pt able to verbally identify: warning signs, coping strategies, places and people that help make the pt feel better/distract negative thoughts, friends/family/agencies/professionals the pt can reach out to in a crisis, and something that is important to the pt/worth living for. Pt provided the national suicide prevention hotline number (1-462-814-806.571.9454) as well as local community behavioral health [facility]) crisis number for emergencies (4-489.658.6659).      Pt to follow up with:  [facility] for substance abuse treatment for medication management, Referral to out patient tobacco cessation counseling treatment:    Patient refused referral to outpatient tobacco cessation counseling    SW offered to assist pt with transportation, pt reports that he will need a cab to go to the rehab

## 2022-02-10 NOTE — PROGRESS NOTES
[facility]  Discharge Note  Bridge Appointment completed: Reviewed Discharge Instructions with patient. Patient verbalizes understanding and agreement with the discharge plan using the teachback method. Referral for Outpatient Tobacco Cessation Counseling, upon discharge (nohemi X if applicable and completed):    ( )  Hospital staff assisted patient to call Quit Line or faxed referral                                   during hospitalization                  ( )  Recognizing danger situations (included triggers and roadblocks), if not completed on admission                    ( )  Coping skills (new ways to manage stress, exercise, relaxation techniques, changing routine, distraction), if not completed on admission                                                           ( )  Basic information about quitting (benefits of quitting, techniques in how to quit, available resources, if not completed on admission  ( ) Referral for counseling faxed to [facility]   ( ) Patient refused referral  ( ) Patient refused counseling  ( x) Patient refused smoking cessation medication upon discharge    Vaccinations (nohemi X if applicable and completed):  ( ) Patient states already received influenza vaccine elsewhere  ( ) Patient received influenza vaccine during this hospitalization  (x ) Patient refused influenza vaccine at this time      Pt discharged with followings belongings:   Dental Appliances: None  Vision - Corrective Lenses: None  Hearing Aid: None  Jewelry: None  Body Piercings Removed: N/A  Clothing: [facility]  Were All Patient Medications Collected?: Not Applicable (Reports not taking prescribed medications for at least a year now.)  Other Valuables: None    Valuables retrieved from safe and returned to patient. Patient left department with staff   via ambulatory to [facility]  , discharged to [facility]  .  Patient education on aftercare instructions: yes  Patient verbalize understanding of AVS:  yes. Suicidal Ideations? No AVH? denies HI?  Negative for homicidal ideation       Status EXAM upon discharge:  Status and Exam  Normal: No  Facial Expression: Flat,Sad,Worried  Affect: Blunt  Level of Consciousness: Alert  Mood:Normal: No  Mood: Depressed,Anxious  Motor Activity:Normal: Yes  Motor Activity: Decreased  Interview Behavior: Cooperative  Preception: [place] to Person,[place] to Time,[place] to Place,[place] to Situation  Attention:Normal: No  Attention: Unable to Concentrate  Thought Processes: Circumstantial  Thought Content:Normal: No  Thought Content: Preoccupations  Hallucinations: None  Delusions: No  Delusions: Persecution  Memory:Normal: Yes  Memory: Poor Remote,Poor Recent  Insight and Judgment: No  Insight and Judgment: Poor Judgment,Poor Insight  Present Suicidal Ideation: No  Present Homicidal Ideation: No

## 2022-02-10 NOTE — PLAN OF CARE
Group Therapy Note    Date: 2/10/2022  Start Time: 1000  End Time:  1030  Number of Participants: 4    Type of Group: Psychoeducation    Wellness Binder Information  Module Name:  Stress  Session Number:  5    Group Goal for Pt: To raise awareness of the effectiveness of diversionary coping skills    Notes:  Pt demonstrated improved awareness of the effectiveness of diversionary coping skills by actively participating in group activity. Status After Intervention:  Unchanged    Participation Level:  Active Listener    Participation Quality: Appropriate and Attentive      Speech:  normal      Thought Process/Content: Logical      Affective Functioning: Congruent      Mood: anxious and depressed      Level of consciousness:  Alert and Oriented x4      Response to Learning: Able to verbalize current knowledge/experience, Able to verbalize/acknowledge new learning, and Progressing to goal      Endings: None Reported    Modes of Intervention: Education      Discipline Responsible: Psychoeducational Specialist      Signature:  Sil Kilgore

## 2022-02-10 NOTE — PROGRESS NOTES
Signed            Group Note     Number of Participants in Group: 4  Number of Patients on Unit:9        Patient attended group:Yes  Reason for Absence:  Intervention for patient absence:         Type of Group:   Wrap-Up/Relaxation     Patient's Goal: See wrap up group sheet     Participation Level: Active Listener, Interactive, Monopolizing, Minimal and None             Patient Response to Learning: Yes     Patient's Behavior: Cooperative and Pleasant     Is Patient Social/Interacting: Yes     Relaxation:              Television:Yes              Reading: Yes              Game/Puzzle: Yes              Phone:  No       Notes/Comments:     Patient attended group on coping skills and filled out wrap up sheet     Please see patient's wrap up group sheet for patient's comments        Electronically signed by Pam Quintero RN on 2/9/22 at 10:33 PM CST

## 2022-02-10 NOTE — PROGRESS NOTES
Treatment Team Note:     SW met with [address] team to discuss Pts TX and DC plans.      Progress/Behavior/Group Attendance: TBD     Target Symptoms/Reason for admission:  male who presents to the emergency department with concern for SI. States he woke up this morning feeling like hanging himself. He has had similar previous feelings in past, most recently requiring admission approx 2 years ago. He tells me he was supposed to be on medication for this, but hasn't been in quite some time. He is homeless. He was [place] near [facility]. He tells me he is from the area. That he has family in the area, and that they know he is homeless. He tells me he does not drink alcohol or use any drugs, ilicit or prescription.  He won't really tell me about any increased stressors      Diagnoses: Mood disorder unspecified, Suicidal ideation, Anxiety disorder unspecified  PTSD, chronic, Insomnia unspecified, Methamphetamine use disorder, in remission  Cannabis use disorder, in remission, Alcohol use disorder, in remission, Tobacco use disorder     UDS: Neg     [facility]     Pt lives with: with his grandmother           Collateral obtained from: sister  On:2/8/22     Family Session: JOSE MIGUEL     Misc:          Revision History

## 2022-02-11 NOTE — PROGRESS NOTES
Progress Note  Jose Daniel Haque  2/10/2022 10:40 PM  Subjective:   Admit Date:   2/5/2022      CC/ADMIT DX:       Interval History:   Reviewed overnight events and nursing notes. He denies any physical complaints. I have reviewed all labs/diagnostics from the last 24hrs. ROS:   I have done a 10 point ROS and all are negative, except what is mentioned in the HPI. No diet orders on file    Medications:             Objective:   Vitals: BP 99/83   Pulse 91   Temp 96.7 °F (35.9 °C) (Temporal)   Resp 20   Ht 5' 11\" (1.803 m)   Wt 132 lb 9.6 oz (60.1 kg)   SpO2 100%   BMI 18.49 kg/m²  No intake or output data in the 24 hours ending 02/10/22 2240  General appearance: alert and cooperative with exam  Extremities: extremities normal, atraumatic, no cyanosis or edema  Neurologic:  No obvious focal neurologic deficits. Skin: no rashes    Assessment and Plan:   Principal Problem:    Persistent mood (affective) disorder, unspecified (HCC)  Active Problems:    Anxiety disorder, unspecified    Tobacco use disorder    Other insomnia  Resolved Problems:    Depression    Vit D Def    Plan:  1. Continue present medication(s)   2. He is medically stable. I will monitor for any changes or concerns. 3.  Follow with Psych      Discharge planning:    home     Reviewed treatment plans with the patient and/or family.              Electronically signed by Ashley Gardner MD on 2/10/2022 at 10:40 PM

## 2022-04-14 ENCOUNTER — HOSPITAL ENCOUNTER (INPATIENT)
Age: 32
LOS: 5 days | Discharge: HOME | DRG: 885 | End: 2022-04-19
Attending: EMERGENCY MEDICINE | Admitting: PSYCHIATRY & NEUROLOGY
Payer: MEDICAID

## 2022-04-14 DIAGNOSIS — F32.A DEPRESSION WITH SUICIDAL IDEATION: Primary | ICD-10-CM

## 2022-04-14 DIAGNOSIS — R45.851 DEPRESSION WITH SUICIDAL IDEATION: Primary | ICD-10-CM

## 2022-04-14 DIAGNOSIS — F10.920 ACUTE ALCOHOLIC INTOXICATION WITHOUT COMPLICATION (HCC): ICD-10-CM

## 2022-04-14 PROBLEM — F10.10 ETOH ABUSE: Status: ACTIVE | Noted: 2022-04-14

## 2022-04-14 LAB
ACETAMINOPHEN LEVEL: <15 UG/ML
ALBUMIN SERPL-MCNC: 5.3 G/DL (ref 3.5–5.2)
ALP BLD-CCNC: 59 U/L (ref 40–130)
ALT SERPL-CCNC: <5 U/L (ref 5–41)
AMPHETAMINE SCREEN, URINE: NEGATIVE
ANION GAP SERPL CALCULATED.3IONS-SCNC: 12 MMOL/L (ref 7–19)
AST SERPL-CCNC: 13 U/L (ref 5–40)
BARBITURATE SCREEN URINE: NEGATIVE
BASOPHILS ABSOLUTE: 0.1 K/UL (ref 0–0.2)
BASOPHILS RELATIVE PERCENT: 0.8 % (ref 0–1)
BENZODIAZEPINE SCREEN, URINE: NEGATIVE
BILIRUB SERPL-MCNC: <0.2 MG/DL (ref 0.2–1.2)
BUN BLDV-MCNC: 11 MG/DL (ref 6–20)
CALCIUM SERPL-MCNC: 9.1 MG/DL (ref 8.6–10)
CANNABINOID SCREEN URINE: NEGATIVE
CHLORIDE BLD-SCNC: 102 MMOL/L (ref 98–111)
CO2: 26 MMOL/L (ref 22–29)
COCAINE METABOLITE SCREEN URINE: NEGATIVE
CREAT SERPL-MCNC: 0.8 MG/DL (ref 0.5–1.2)
EOSINOPHILS ABSOLUTE: 0.4 K/UL (ref 0–0.6)
EOSINOPHILS RELATIVE PERCENT: 4.7 % (ref 0–5)
ETHANOL: 165 MG/DL (ref 0–0.08)
ETHANOL: 90 MG/DL (ref 0–0.08)
GFR AFRICAN AMERICAN: >59
GFR NON-AFRICAN AMERICAN: >60
GLUCOSE BLD-MCNC: 86 MG/DL (ref 74–109)
HCT VFR BLD CALC: 46.7 % (ref 42–52)
HEMOGLOBIN: 15.4 G/DL (ref 14–18)
IMMATURE GRANULOCYTES #: 0 K/UL
LYMPHOCYTES ABSOLUTE: 2.8 K/UL (ref 1.1–4.5)
LYMPHOCYTES RELATIVE PERCENT: 34.2 % (ref 20–40)
Lab: NORMAL
MCH RBC QN AUTO: 29.4 PG (ref 27–31)
MCHC RBC AUTO-ENTMCNC: 33 G/DL (ref 33–37)
MCV RBC AUTO: 89.3 FL (ref 80–94)
MONOCYTES ABSOLUTE: 0.4 K/UL (ref 0–0.9)
MONOCYTES RELATIVE PERCENT: 5.3 % (ref 0–10)
NEUTROPHILS ABSOLUTE: 4.5 K/UL (ref 1.5–7.5)
NEUTROPHILS RELATIVE PERCENT: 54.6 % (ref 50–65)
OPIATE SCREEN URINE: NEGATIVE
PDW BLD-RTO: 12.3 % (ref 11.5–14.5)
PLATELET # BLD: 314 K/UL (ref 130–400)
PMV BLD AUTO: 9.8 FL (ref 9.4–12.4)
POTASSIUM REFLEX MAGNESIUM: 4.1 MMOL/L (ref 3.5–5)
RBC # BLD: 5.23 M/UL (ref 4.7–6.1)
SALICYLATE, SERUM: <3 MG/DL (ref 3–10)
SARS-COV-2, NAAT: NOT DETECTED
SODIUM BLD-SCNC: 140 MMOL/L (ref 136–145)
TOTAL PROTEIN: 7.6 G/DL (ref 6.6–8.7)
WBC # BLD: 8.3 K/UL (ref 4.8–10.8)

## 2022-04-14 PROCEDURE — 1240000000 HC EMOTIONAL WELLNESS R&B

## 2022-04-14 PROCEDURE — 36415 COLL VENOUS BLD VENIPUNCTURE: CPT

## 2022-04-14 PROCEDURE — 80307 DRUG TEST PRSMV CHEM ANLYZR: CPT

## 2022-04-14 PROCEDURE — 80053 COMPREHEN METABOLIC PANEL: CPT

## 2022-04-14 PROCEDURE — 85025 COMPLETE CBC W/AUTO DIFF WBC: CPT

## 2022-04-14 PROCEDURE — 80179 DRUG ASSAY SALICYLATE: CPT

## 2022-04-14 PROCEDURE — 99285 EMERGENCY DEPT VISIT HI MDM: CPT

## 2022-04-14 PROCEDURE — 82077 ASSAY SPEC XCP UR&BREATH IA: CPT

## 2022-04-14 PROCEDURE — 80143 DRUG ASSAY ACETAMINOPHEN: CPT

## 2022-04-14 PROCEDURE — 87635 SARS-COV-2 COVID-19 AMP PRB: CPT

## 2022-04-14 RX ORDER — NICOTINE 21 MG/24HR
1 PATCH, TRANSDERMAL 24 HOURS TRANSDERMAL DAILY
Status: DISCONTINUED | OUTPATIENT
Start: 2022-04-14 | End: 2022-04-19 | Stop reason: HOSPADM

## 2022-04-14 RX ORDER — POLYETHYLENE GLYCOL 3350 17 G/17G
17 POWDER, FOR SOLUTION ORAL DAILY PRN
Status: DISCONTINUED | OUTPATIENT
Start: 2022-04-14 | End: 2022-04-19 | Stop reason: HOSPADM

## 2022-04-14 RX ORDER — ACETAMINOPHEN 325 MG/1
650 TABLET ORAL EVERY 4 HOURS PRN
Status: DISCONTINUED | OUTPATIENT
Start: 2022-04-14 | End: 2022-04-19 | Stop reason: HOSPADM

## 2022-04-14 ASSESSMENT — ENCOUNTER SYMPTOMS
SHORTNESS OF BREATH: 0
SINUS PRESSURE: 0
RHINORRHEA: 0
VOMITING: 0
DIARRHEA: 0
SORE THROAT: 0
NAUSEA: 0
ABDOMINAL PAIN: 0

## 2022-04-14 ASSESSMENT — SLEEP AND FATIGUE QUESTIONNAIRES
DIFFICULTY ARISING: NO
DO YOU HAVE DIFFICULTY SLEEPING: YES
SLEEP PATTERN: DIFFICULTY FALLING ASLEEP;RESTLESSNESS
DO YOU USE A SLEEP AID: YES
AVERAGE NUMBER OF SLEEP HOURS: 6
RESTFUL SLEEP: NO
DIFFICULTY STAYING ASLEEP: YES
DIFFICULTY FALLING ASLEEP: YES

## 2022-04-14 ASSESSMENT — LIFESTYLE VARIABLES: HISTORY_ALCOHOL_USE: YES

## 2022-04-14 NOTE — ED NOTES
Pt states \"I keep having flashes of forgetting where I'm at. \" pt oriented to person, time, situation, and knows he is in [place] [address] but is unable to state where.       Sinan Urias RN  04/14/22 8756

## 2022-04-14 NOTE — ED PROVIDER NOTES
[facility] ADULT [facility]  eMERGENCY dEPARTMENT eNCOUnter      Pt Name: Jaida Carrero  MRN: [id number]  [place] 1990  Date of evaluation: 4/14/2022  Provider: Jonathan Turner MD    CHIEF COMPLAINT       Chief Complaint   Patient presents with    Suicidal     pt states \"I see a vision of me killing myself. \"          HISTORY OF PRESENT ILLNESS   (Location/Symptom, Timing/Onset,Context/Setting, Quality, Duration, Modifying Factors, Severity)  Note limiting factors. Jaida Carrero is a 36 y.o. male who presents to the emergency department suicidal ideation and hallucinations    HPI     Patient called an ambulance from the [facility] [place] saying he is having visions of him killing himself and reports that he has had these visions for a while and is obsessed with the visions to the point where he is forgetting his sense of time or where he is or where he has been. He has been cooperative in the emergency department initially. Acknowledges he was last admitted here in February and was discharged home as per patient on trazodone and Wellbutrin. He says he is taking those medications but they are not helping him with his suicidal thoughts and visions. NursingNotes were reviewed. REVIEW OF SYSTEMS    (2-9 systems for level 4, 10 or more for level 5)     Review of Systems   Constitutional: Negative for chills, diaphoresis, fatigue and fever. HENT: Negative for rhinorrhea, sinus pressure and sore throat. Eyes: Negative for visual disturbance. Respiratory: Negative for shortness of breath. Cardiovascular: Negative for chest pain. Gastrointestinal: Negative for abdominal pain, diarrhea, nausea and vomiting. Genitourinary: Negative for difficulty urinating and dysuria. Musculoskeletal: Negative for arthralgias and myalgias. Skin: Negative for rash. Neurological: Negative for weakness. Psychiatric/Behavioral: Positive for decreased concentration, hallucinations and suicidal ideas.  Negative for confusion. All other systems reviewed and are negative. PAST MEDICALHISTORY     Past Medical History:   Diagnosis Date    Alcohol abuse     Bipolar 1 disorder ([facility] [address]     Post traumatic stress disorder (PTSD)          SURGICAL HISTORY       Past Surgical History:   Procedure Laterality Date    OTHER SURGICAL HISTORY      cut tendon in right little finger         CURRENT MEDICATIONS     Current Discharge Medication List      CONTINUE these medications which have NOT CHANGED    Details   traZODone (DESYREL) 50 MG tablet Take 1 tablet by mouth nightly as needed for Sleep  Qty: 30 tablet, Refills: 1      buPROPion (WELLBUTRIN XL) 150 MG extended release tablet Take 1 tablet by mouth daily  Qty: 30 tablet, Refills: 0      vitamin D (ERGOCALCIFEROL) 1.25 MG (69367 UT) CAPS capsule Take 1 capsule by mouth once a week  Qty: 5 capsule, Refills: 0             ALLERGIES     Patient has no known allergies. FAMILY HISTORY       Family History   Problem Relation Age of Onset    Other Father           SOCIAL HISTORY       Social History     Socioeconomic History    Marital status: Single     Spouse name: None    Number of children: 0    Years of education: None    Highest education level: None   Occupational History    None   Tobacco Use    Smoking status: Current Every Day Smoker     Packs/day: 1.00     Years: 10.00     Pack years: 10.00    Smokeless tobacco: Never Used   Vaping Use    Vaping Use: Never used   Substance and Sexual Activity    Alcohol use: Not Currently     Comment: Has not partook in ETOH since approx.  8/2020    Drug use: Not Currently     Comment: Not since 8/2020    Sexual activity: Yes     Partners: Female   Other Topics Concern    None   Social History Narrative    None     Social Determinants of Health     Financial Resource Strain:     Difficulty of Paying Living Expenses: Not on file   Food Insecurity:     Worried About Running Out of Food in the Last Year: Not on file   Shamika Christina Ran Out of Food in the Last Year: Not on file   Transportation Needs:     Lack of Transportation (Medical): Not on file    Lack of Transportation (Non-Medical): Not on file   Physical Activity:     Days of Exercise per Week: Not on file    Minutes of Exercise per Session: Not on file   Stress:     Feeling of Stress : Not on file   Social Connections:     Frequency of Communication with Friends and Family: Not on file    Frequency of Social Gatherings with Friends and Family: Not on file    Attends [employer] Services: Not on file    Active Member of [address] [place] [address] [employer] or Organizations: Not on file    Attends Club or Organization Meetings: Not on file    Marital Status: Not on file   Intimate Partner Violence:     Fear of Current or Ex-Partner: Not on file    Emotionally Abused: Not on file    Physically Abused: Not on file    Sexually Abused: Not on file   Housing Stability:     Unable to Pay for Housing in the Last Year: Not on file    Number of [place] in the Last Year: Not on file    Unstable Housing in the Last Year: Not on file       SCREENINGS    [place] Coma Scale  Eye Opening: Spontaneous  Best Verbal Response: Oriented  Best Motor Response: Obeys commands  [place] Coma Scale Score: 15        PHYSICAL EXAM    (up to 7 for level 4, 8 or more for level 5)     ED Triage Vitals   BP Temp Temp Source Pulse Resp SpO2 Height Weight   04/14/22 0533 04/14/22 0535 04/14/22 0535 04/14/22 0533 04/14/22 0533 04/14/22 0533 -- --   123/82 98 °F (36.7 °C) Oral 105 20 94 %         Physical Exam  Vitals and nursing note reviewed. Constitutional:       Appearance: He is well-developed. HENT:      Head: Normocephalic and atraumatic. Eyes:      General:         Right eye: No discharge. Left eye: No discharge. Conjunctiva/sclera: Conjunctivae normal.      Pupils: Pupils are equal, round, and reactive to light. Cardiovascular:      Rate and Rhythm: Normal rate and regular rhythm.       Heart sounds: Normal heart sounds. Pulmonary:      Effort: Pulmonary effort is normal. No respiratory distress. Breath sounds: Normal breath sounds. No wheezing or rales. Abdominal:      General: Bowel sounds are normal.      Palpations: Abdomen is soft. There is no mass. Tenderness: There is no abdominal tenderness. There is no guarding or rebound. Musculoskeletal:         General: No tenderness. Normal range of motion. Cervical back: Normal range of motion and neck supple. Skin:     General: Skin is warm and dry. Capillary Refill: Capillary refill takes less than 2 seconds. Neurological:      Mental Status: He is alert and oriented to person, place, and time. Psychiatric:      Comments: Suicidal ideation; abnormal thought content occluding visions of suicide; flat affect dysphoric mood         DIAGNOSTIC RESULTS     EKG: All EKG's areinterpreted by the Emergency Department Physician who either signs or Co-signs this chart in the absence of a cardiologist.        RADIOLOGY:  Non-plain film images such as CT, Ultrasound and MRI are read by the radiologist. Plain radiographic images are visualized and preliminarily interpreted bythe emergency physician with the below findings:          No orders to display           LABS:  [address] TO  FOR LOW K - Abnormal; Notable for the following components:       Result Value    Albumin 5.3 (*)     ALT <5 (*)     All other components within normal limits   COVID-19, RAPID   CBC WITH AUTO DIFFERENTIAL   ETHANOL   URINE DRUG SCREEN   ACETAMINOPHEN LEVEL   SALICYLATE LEVEL   ETHANOL       All other labs were within normal range or not returned as of this dictation.     EMERGENCY DEPARTMENT COURSE and DIFFERENTIAL DIAGNOSIS/MDM:   Vitals:    Vitals:    04/17/22 0753 04/17/22 1208 04/17/22 1630 04/17/22 1929   BP: 110/69 121/69 (!) 109/59 117/73   Pulse: 82 80 75 74   Resp: 16 18 16 16   Temp: 98.2 °F (36.8 °C) 97.7 °F (36.5 °C) 97.4 °F (36.3 °C) 99.1 °F (37.3 °C)   TempSrc: Temporal   Temporal   SpO2: 100% 99% 99% 98%       MDM     Laboratory studies and medical clearance are pending. The patient is endorsed to the morning provider to follow up with the results of testing. Will likely require admission. Reassessment    CONSULTS:  None    PROCEDURES:  Unless otherwise noted below, none     Procedures    FINAL IMPRESSION      1. Depression with suicidal ideation    2.  Acute alcoholic intoxication without complication [facility])          DISPOSITION/PLAN   DISPOSITION Decision To Admit 04/14/2022 10:38:49 AM      PATIENT REFERRED TO:  [facility]   [address], [address]   Phone [phone]   Fax [phone]   CRISIS LINE: [phone]           [facility]   [address], [address]   Phone [phone]   Fax [phone]   CRISIS LINE: [phone]             DISCHARGE MEDICATIONS:  Current Discharge Medication List             (Please note that portions of this note were completed with a voice recognition program.  Efforts were made to edit thedictations but occasionally words are mis-transcribed.)    Christine Lei MD (electronically signed)  Attending Emergency Physician          Christine Lei MD  04/17/22 0276

## 2022-04-14 NOTE — PROGRESS NOTES
[facility] Admission Note  Nursing Admission Note        Reason for Admission: having visions of him killing himself and reports that he has had these visions for a while and is obsessed with the visions to the point where he is forgetting his sense of time or where he is or where he has been    Patient Active Problem List   Diagnosis    Anxiety disorder, unspecified    Bipolar affective disorder, currently depressed, moderate ([facility]    Tobacco use disorder    Bipolar I disorder, most recent episode depressed, severe without psychotic features ([facility]    Persistent mood (affective) disorder, unspecified ([facility]    Bipolar disorder, current episode depressed, moderate ([facility]    Other insomnia    ETOH abuse         Addictive Behavior:   Addictive Behavior  In the past 3 months, have you felt or has someone told you that you have a problem with:  : Other(Comments)  Do you have a history of Chemical Use?: Yes  Do you have a history of Alcohol Use?: Yes  Do you have a history of Street Drug Abuse?: Yes  Histroy of Prescripton Drug Abuse?: No    Medical Problems:   Past Medical History:   Diagnosis Date    Alcohol abuse     Bipolar 1 disorder ([facility]     Post traumatic stress disorder (PTSD)        Status EXAM:  Status and Exam  Normal: No  Facial Expression: Avoids Gaze,Worried,Flat  Affect: Blunt  Level of Consciousness: Alert  Mood:Normal: No  Mood: Suspicious,Depressed,Anxious  Motor Activity:Normal: No  Motor Activity: Decreased  Interview Behavior: Evasive  Preception: [place] to Person,[place] to Place,[place] to Time,[place] to Situation  Attention:Normal: No  Attention: Unable to Concentrate,Distractible  Thought Processes: Circumstantial  Thought Content:Normal: No  Thought Content: Delusions,Obsessions,Preoccupations  Hallucinations: Visual (Comment)  Delusions: Yes  Delusions: Obsessions  Memory:Normal: No  Memory: Poor Recent  Insight and Judgment: No  Insight and Judgment: Poor Judgment,Poor Insight  Present Suicidal Ideation: No  Present Homicidal Ideation: No      Metabolic Screening:    Lab Results   Component Value Date    LABA1C 4.7 02/07/2022     Lab Results   Component Value Date    CHOL 116 (L) 02/07/2022     Lab Results   Component Value Date    TRIG 98 02/07/2022     Lab Results   Component Value Date    HDL 52 (L) 02/07/2022     No components found for: LDLCAL  No results found for: LABVLDL    There is no height or weight on file to calculate BMI. BP Readings from Last 2 Encounters:   04/14/22 113/76   02/10/22 99/83       PATIENT STRENGTHS:  Strengths: No significant Physical Illness    Patient Strengths and Limitations:  Limitations: Multiple barriers to leisure interests,Tendency to isolate self      Tobacco Screening:  Practical Counseling, on admission, nohemi X, if applicable and completed (first 3 are required if patient doesn't refuse):            Recognizing danger situations (included triggers and roadblocks)   yes              Coping skills (new ways to manage stress, exercise, relaxation techniques, changing routine, distraction  Refuses at this time                                                    Basic information about quitting (benefits of quitting, techniques in how to quit, available resources discussed nicotine patch, not interested in quitting smoking  Referral for counseling faxed to [facility]                          Patient refused counseling yes  Patient has not smoked in the last 30 days pt everyday smoker  Patient offered nicotine patch. Received yes            Admission to Unit:    Pt admitted to [facility] under the care of Dr. Aime Mantilla,  arrived on unit via [facility] with security and staff from ed    Patient arrived dressed in paper scrubs:  yes. Body assessment and safety check completed by sage and [employer] form float pool no contraband discovered. Patient belongings and valuables was cataloged and accounted for by [facility].      Admission completed by sage  Oriented to unit, unit policy and expectations:  yes    Reviewed and explained all legal documents:  yes    Education for Fall Prevention and Restraints given: yes    Patient signed all legal documents yes   Pt verbalizes understanding:yes     Vanessa Bruno Obtained? yes    Patient Teaching:    Identifies stressors. yes   Visual hallucinations            Protective Factors:    Patient identifies protective factors with nursing staff as follows: Identifies reasons for living: Yes   Supportive Social Network or family: Yes    Belief that suicide is immoral/high spirituality: Yes   Responsibility to family or others/living with family: Yes   Fear of death or dying due to pain and suffering: Yes   Engaged in work or school: No     If Patient is unable to identify, reason why? COVID TEACHING:   Nursing provided education regarding COVID for social distancing, wearing masks, washing hands, and reporting any symptoms: yes  Mask Provided: yes If patient refused, reason:        Admission Note:    Pt states that he has had visions of killing himself. He states he becomes so focused on them that he looses track of time. He denies any street drug or prescription drug abuse and when asked about ETOH he states \"I only drink a little,. \"  \"last night I only had 4 beers. \"  \"I don't drink very often, not even weekly. \"          Electronically signed by Francesca Burgess RN on 4/14/22 at 6:33 PM CDT

## 2022-04-14 NOTE — ED PROVIDER NOTES
[facility] EMERGENCY DEPT  eMERGENCYdEPARTMENT eNCOUnter      Pt Name: Elyssa Cook  MRN: [id number]  [place] 1990  Date of evaluation: 4/14/2022  Tony Shafer MD    Emergency Department care of this patient was assumed at 0630 from Dr. Inna Amin. We have discussed the case and the plan of care. I have seen and evaluated patient and reviewed ED course. CHIEF COMPLAINT       Chief Complaint   Patient presents with    Suicidal     pt states \"I see a vision of me killing myself. \"          PHYSICAL EXAM    (up to 7 for level 4, 8 or more for level 5)     ED Triage Vitals   BP Temp Temp Source Pulse Resp SpO2 Height Weight   04/14/22 0533 04/14/22 0535 04/14/22 0535 04/14/22 0533 04/14/22 0533 04/14/22 0533 -- --   123/82 98 °F (36.7 °C) Oral 105 20 94 %         Physical Exam    DIAGNOSTIC RESULTS           No orders to display           LABS:  Labs Reviewed   COMPREHENSIVE METABOLIC PANEL W/ REFLEX TO MG FOR LOW K - Abnormal; Notable for the following components:       Result Value    Albumin 5.3 (*)     ALT <5 (*)     All other components within normal limits   COVID-19, RAPID   CBC WITH AUTO DIFFERENTIAL   ETHANOL   URINE DRUG SCREEN   ACETAMINOPHEN LEVEL   SALICYLATE LEVEL       All other labs were within normal range or not returned as of this dictation.     EMERGENCY DEPARTMENT COURSE and DIFFERENTIAL DIAGNOSIS/MDM:   Vitals:    Vitals:    04/14/22 0533 04/14/22 0535   BP: 123/82    Pulse: 105    Resp: 20    Temp:  98 °F (36.7 °C)   TempSrc:  Oral   SpO2: 94%        MDM    Pt in NAD here, presents for SI having visions of killing himself, labs reassuring but etoh 165, once sober will d/w [address], had called Dr. Will Reynoso for admit but did not realize intoxicated , anticipate admission though, recheck etoh at 1000, cont to monitor 0639    Pt now sober, has been asleep in NAD, I woke him up and still having same SI thoughts, d/w Dr. Frieda Barkley for admission    CONSULTS:  None    PROCEDURES:  Unless otherwise noted below, none     Procedures    FINAL IMPRESSION      1. Depression with suicidal ideation          DISPOSITION/PLAN   DISPOSITION Decision To Admit    PATIENT REFERRED TO:  No follow-up provider specified.     DISCHARGE MEDICATIONS:  New Prescriptions    No medications on file          (Please note that portions ofthis note were completed with a voice recognition program.  Efforts were made to edit the dictations but occasionally words are mis-transcribed.)    Saulo Samson MD(electronically signed)  Attending Emergency Physician         Loyd Shea MD  04/14/22 1059

## 2022-04-14 NOTE — ED TRIAGE NOTES
Pt states \"I been having visions of me killing myself. \" Pt states that he has been having these thought off an on for a while.

## 2022-04-14 NOTE — ED NOTES
Pt changed into maroon scrubs, belongings removed from room;  cabinets locked. Security notified.       Kumar Villasenor RN  04/14/22 0432

## 2022-04-15 PROBLEM — F31.60 BIPOLAR AFFECTIVE DISORDER, MOST RECENT EPISODE MIXED (HCC): Status: ACTIVE | Noted: 2022-04-15

## 2022-04-15 PROCEDURE — 6370000000 HC RX 637 (ALT 250 FOR IP): Performed by: PSYCHIATRY & NEUROLOGY

## 2022-04-15 PROCEDURE — 99223 1ST HOSP IP/OBS HIGH 75: CPT | Performed by: PSYCHIATRY & NEUROLOGY

## 2022-04-15 PROCEDURE — 1240000000 HC EMOTIONAL WELLNESS R&B

## 2022-04-15 RX ORDER — HYDROXYZINE HYDROCHLORIDE 25 MG/1
25 TABLET, FILM COATED ORAL 3 TIMES DAILY PRN
Status: DISCONTINUED | OUTPATIENT
Start: 2022-04-15 | End: 2022-04-19 | Stop reason: HOSPADM

## 2022-04-15 RX ORDER — BUPROPION HYDROCHLORIDE 150 MG/1
150 TABLET ORAL DAILY
Status: DISCONTINUED | OUTPATIENT
Start: 2022-04-15 | End: 2022-04-19 | Stop reason: HOSPADM

## 2022-04-15 RX ORDER — TRAZODONE HYDROCHLORIDE 50 MG/1
50 TABLET ORAL NIGHTLY PRN
Status: DISCONTINUED | OUTPATIENT
Start: 2022-04-15 | End: 2022-04-15

## 2022-04-15 RX ORDER — DIVALPROEX SODIUM 500 MG/1
500 TABLET, EXTENDED RELEASE ORAL DAILY
Status: DISCONTINUED | OUTPATIENT
Start: 2022-04-15 | End: 2022-04-19 | Stop reason: HOSPADM

## 2022-04-15 RX ORDER — TRAZODONE HYDROCHLORIDE 50 MG/1
50 TABLET ORAL NIGHTLY
Status: DISCONTINUED | OUTPATIENT
Start: 2022-04-15 | End: 2022-04-19 | Stop reason: HOSPADM

## 2022-04-15 RX ADMIN — BUPROPION HYDROCHLORIDE 150 MG: 150 TABLET, EXTENDED RELEASE ORAL at 12:07

## 2022-04-15 RX ADMIN — TRAZODONE HYDROCHLORIDE 50 MG: 50 TABLET ORAL at 22:11

## 2022-04-15 RX ADMIN — DIVALPROEX SODIUM 500 MG: 500 TABLET, EXTENDED RELEASE ORAL at 12:06

## 2022-04-15 ASSESSMENT — PATIENT HEALTH QUESTIONNAIRE - PHQ9: SUM OF ALL RESPONSES TO PHQ QUESTIONS 1-9: 11
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
## 2022-04-15 NOTE — PROGRESS NOTES
Admission Note      Reason for admission/Target Symptom: Patient admitted to [facility] due to:Pt states \"I keep having flashes of forgetting where I'm at. \" pt oriented to person, time, situation, and knows he is in [place] [address] but is unable to state where   Diagnoses: Bipolar disorder, most recent episode mixed, Stimulants use disorder, in sustained remission, Alcohol use disorder, in sustained remission, Cannabis use disorder, in sustained remission, Tobacco use disorder  UDS: Neg  BAL:  80    SW met with treatment team to discuss patient's treatment including care planning, discharge planning, and follow-up needs. Pt has been admitted to [facility]. Treatment team has identified patient's discharge needs as medication management and outpatient therapy/counseling. Pt confirmed  the need for ongoing treatment post inpatient stay. Pt was also provided a handout of contact information for drug and alcohol treatment centers and other community support service such as [facility], AA, and [facility].

## 2022-04-15 NOTE — PROGRESS NOTES
Group Therapy Note    Start Time: 843  End Time:  8002  Number of Participants: 12    Type of Group: Community Meeting       Patient's Goal:  Feeling better      Notes:      Participation Level:  Active Listener       Participation Quality: Appropriate      Thought Process/Content: Logical      Affective Functioning: Congruent      Mood: calm      Level of consciousness:  Alert      Modes of Intervention: Support      Discipline Responsible: Behavioral Health Tech II      Signature:  Jennifer Olivas

## 2022-04-15 NOTE — PROGRESS NOTES
Treatment Team Note:    DOT met with [address] team to discuss Pts [address] plans. Progress/Behavior/Group Attendance: TBD    Target Symptoms/Reason for admission: Patient admitted to [facility] due to:Pt states \"I keep having flashes of forgetting where I'm at. \" pt oriented to person, time, situation, and knows he is in [place] [address] but is unable to state where     Diagnoses: Bipolar disorder, most recent episode mixed, Stimulants use disorder, in sustained remission, Alcohol use disorder, in sustained remission, Cannabis use disorder, in sustained remission, Tobacco use disorder  UDS: Neg  BAL:  90    AftercarePlan: [address] lives with: DOT will meet with pt to gather information. Collateral obtained from: DOT will meet with pt to gather release of information.   On:    Family Session: JOSE MIGUEL    Misc:

## 2022-04-15 NOTE — PROGRESS NOTES
[facility] Adult Unit Daily Assessment  Nursing Progress Note    Room: 06/607-02   Name: Sam Villarreal   Age: 36 y.o. Gender: male   Dx: ETOH abuse  Precautions: suicide risk, fall risk and seizure precautions  Inpatient Status: voluntary       SLEEP:    Sleep Quality Good  Sleep Medications: No   PRN Sleep Meds: No       MEDICAL:    Other PRN Meds: No   Med Compliant: n/a  Accu-Chek: No  Oxygen/CPAP/BiPAP: No  CIWA/CINA: Yes   PAIN Assessment: none  Side Effects from medication: No    COVID Teaching:    Is Patient experiencing any respiratory symptoms (headache, fever, body aches, cough. Shaaron Money ): no  Patient educated by nursing to practice social distancing, wear masks, wash hands frequently: yes    Protective Factors:    Patient identifies protective factors with nursing staff as follows: Identifies reasons for living: Yes   Supportive Social Network or family: Yes    Belief that suicide is immoral/high spirituality: Yes   Responsibility to family or others/living with family: Yes   Fear of death or dying due to pain and suffering: Yes   Engaged in work or school: No     If Patient is unable to identify, reason why? n/a      PSYCH:    Depression: 10   Anxiety: 10   SI active \"I keep seeing visons. I keep seeing myself stabbing myself\". HI Negative for homicidal ideation      AVH:Present - \"I keep seeing visons. I keep seeing myself stabbing myself\". GENERAL:    Appetite: good   Percent Meals: 25%   Social: No   Speech: normal   Appearance: appropriately dressed, no or minimal eye contact and healthy looking    GROUP:    Group Participation: No  Participation Quality: None    Notes:     Patient is cooperative, Alert and Oriented x4, appears Withdrawn. Patient Rates Depression a 10 and Anxiety a 10 on a 0-10 scale, with 10 being the worst. Patient affect is Congruent and Blunted. Exhibited a Flat facial expression; maintained poor eye contact throughout interview. Patient reports having current SI,AVH. Patient states,\" I dont know about anything. I keep like flashing in and out. I have a couple seconds when I dont know where I am at. I can reoritent myself\". Offered to contact Psychiatrist concerning medications to promote sleep; Patient declined. No signs of distress noted; Patient observed resting in bed with eyes closed after interview.        Electronically signed by Gloria Godwin RN on 4/14/22 at 11:39 PM CDT

## 2022-04-15 NOTE — PROGRESS NOTES
[facility] Daily Shift Assessment  Nursing Progress Note    Room: 0607/607-02 Name: Sully De Souza Age: 36 y.o. Ethnicity:  Gender: male   Dx: ETOH abuse  Precautions: suicide risk  CPAP: No Accu-Chek: No  MSE:  Status and Exam  Normal: No  Facial Expression: Avoids Gaze,Flat  Affect: Congruent,Blunt,Constricted  Level of Consciousness: Alert  Mood:Normal: No  Mood: Depressed,Anxious,Suspicious  Motor Activity:Normal: No  Motor Activity: Decreased  Interview Behavior: Cooperative,Evasive  Preception: [place] to Person,[place] to Time,[place] to Place,[place] to Situation  Attention:Normal: No  Attention: Distractible,Unable to Concentrate  Thought Processes: Circumstantial  Thought Content:Normal: No  Thought Content: Obsessions  Hallucinations: Visual (Comment)  Delusions: Yes  Delusions: Obsessions  Memory:Normal: No  Memory: Poor Recent,Poor Remote  Insight and Judgment: No  Insight and Judgment: Poor Judgment,Poor Insight  Present Suicidal Ideation: No  Present Homicidal Ideation: No  Sleep: Yes, Very good, sleeps excessively Hours Slept: 12 Sched Sleep Meds: Yes PRN Sleep Meds: Yes Other PRN Meds: Yes Med Compliant: Yes Appetite: good Percent Meals: 75% Social: No ADLs: No Speech: hesitant Depression: 10 Anxiety: 8    Pt reports good sleep. Has slept all day except for meal times. He is not social with peers nor staff. He has not preformed adls. He does not attend groups. He is compliant with medications. He denies HI. Reports still seeing visions of his death. Denies AH.   Contracts for safety    Keshav Prajapati RN

## 2022-04-15 NOTE — PLAN OF CARE
Problem: Depressive Behavior With or Without Suicide Precautions:  Goal: Able to verbalize and/or display a decrease in depressive symptoms  4/15/2022 1142 by Tamera Bardales  Outcome: Ongoing   Group Therapy Note     Date: 4/15/2022  Start Time: 1100  End Time:  1130  Number of Participants: 10     Type of Group: Psychoeducation     Wellness Binder Information  Module Name:  emotional wellness  Session Number:  1     Patient's Goal:  validation of feelings     Notes:  pt acknowledged to have feelings validated it may be necessary to share feelings with others.      Status After Intervention:  Improved     Participation Level: Interactive     Participation Quality: Appropriate, Attentive, and Sharing        Speech:  normal        Thought Process/Content: Logical        Affective Functioning: Congruent        Mood: congruent        Level of consciousness:  Alert, Oriented x4, and Attentive        Response to Learning: Able to verbalize current knowledge/experience        Endings: None Reported     Modes of Intervention: Education        Discipline Responsible: Psychoeducational Specialist        Signature:  Tamera Bardales

## 2022-04-15 NOTE — PLAN OF CARE
Problem: Depressive Behavior With or Without Suicide Precautions:  Goal: Able to verbalize acceptance of life and situations over which he or she has no control  Description: Able to verbalize acceptance of life and situations over which he or she has no control  Outcome: Ongoing  Goal: Able to verbalize and/or display a decrease in depressive symptoms  Description: Able to verbalize and/or display a decrease in depressive symptoms  Outcome: Ongoing  Goal: Ability to disclose and discuss suicidal ideas will improve  Description: Ability to disclose and discuss suicidal ideas will improve  Outcome: Ongoing  Goal: Able to verbalize support systems  Description: Able to verbalize support systems  Outcome: Ongoing  Goal: Absence of self-harm  Description: Absence of self-harm  Outcome: Ongoing  Goal: Patient specific goal  Description: Patient specific goal  Outcome: Ongoing  Goal: Participates in care planning  Description: Participates in care planning  Outcome: Ongoing     Problem: Substance Abuse:  Goal: Absence of drug withdrawal signs and symptoms  Description: Absence of drug withdrawal signs and symptoms  Outcome: Ongoing  Goal: Participates in care planning  Description: Participates in care planning  Outcome: Ongoing  Goal: Patient specific goal  Description: Patient specific goal  Outcome: Ongoing

## 2022-04-15 NOTE — PROGRESS NOTES
Behavioral Services  Medicare Certification Upon Admission    I certify that this patient's inpatient psychiatric hospital admission is medically necessary for:    [x] (1) Treatment which could reasonably be expected to improve this patient's condition,       [x] (2) Or for diagnostic study;     AND     [x](2) The inpatient psychiatric services are provided while the individual is under the care of a physician and are included in the individualized plan of care.     Estimated length of stay/service 5-7 days    Plan for post-hospital care TBD    Electronically signed by Claudette Velasco MD on 4/15/2022 at 10:09 AM

## 2022-04-15 NOTE — PROGRESS NOTES
Requirement Note     SW met with pt to complete Psychosocial and CSSR-S on this date. Patients long and short term goals discussed. Patient voiced understanding. Treatment plan sheet signed. Patient verbalized understanding of the treatment plan. Patient participated in goals and objectives of the treatment plan. Patient discussed safety plan with . SW explained treatment goals with pt. Decreasing depression and anxiety by improvement of positive coping patterns was discussed. Pt acknowledged understanding of treatment goals and signed treatment plan signature sheet. In the last 6 months has the pt been a danger to self: YES  In the last 6 months has the pt been a danger to others: NO  Legal Guardian/POA: NO     Provided patient with [employer] handout entitled \"Quitting Smoking. \"  Reviewed handout with patient: addressing dangers of smoking, developing coping skills, and providing basic information about quitting. Patient received all components practical counseling of tobacco practical counseling during the hospital stay.

## 2022-04-16 PROBLEM — R45.851 DEPRESSION WITH SUICIDAL IDEATION: Status: ACTIVE | Noted: 2020-06-16

## 2022-04-16 PROBLEM — F32.A DEPRESSION WITH SUICIDAL IDEATION: Status: ACTIVE | Noted: 2020-06-16

## 2022-04-16 PROCEDURE — 99221 1ST HOSP IP/OBS SF/LOW 40: CPT | Performed by: NURSE PRACTITIONER

## 2022-04-16 PROCEDURE — 99233 SBSQ HOSP IP/OBS HIGH 50: CPT | Performed by: PSYCHIATRY & NEUROLOGY

## 2022-04-16 PROCEDURE — 1240000000 HC EMOTIONAL WELLNESS R&B

## 2022-04-16 PROCEDURE — 6370000000 HC RX 637 (ALT 250 FOR IP): Performed by: PSYCHIATRY & NEUROLOGY

## 2022-04-16 RX ADMIN — HYDROXYZINE HYDROCHLORIDE 25 MG: 25 TABLET, FILM COATED ORAL at 21:41

## 2022-04-16 RX ADMIN — TRAZODONE HYDROCHLORIDE 50 MG: 50 TABLET ORAL at 21:33

## 2022-04-16 RX ADMIN — HYDROXYZINE HYDROCHLORIDE 25 MG: 25 TABLET, FILM COATED ORAL at 10:10

## 2022-04-16 RX ADMIN — BUPROPION HYDROCHLORIDE 150 MG: 150 TABLET, EXTENDED RELEASE ORAL at 08:01

## 2022-04-16 RX ADMIN — DIVALPROEX SODIUM 500 MG: 500 TABLET, EXTENDED RELEASE ORAL at 08:00

## 2022-04-16 ASSESSMENT — ENCOUNTER SYMPTOMS
GASTROINTESTINAL NEGATIVE: 1
RESPIRATORY NEGATIVE: 1
EYES NEGATIVE: 1

## 2022-04-16 NOTE — PROGRESS NOTES
Grandmother called ([phone]), did not have code and couldn't deny or confirm patient was here. She reports patient has called her and requesting her to come pick him up on Monday. She called writer saying she cannot handle patient anymore. \"He does well if he is someplace like he is now or in half-way. He wants me to pick him up and [place] him [place] the mall to who knows with. I cant dump him anymore. I just wanted to call to let you know that I cant pick him up. His mother may can but I cant do it anymore. \"

## 2022-04-16 NOTE — PROGRESS NOTES
Group Note    Number of Participants in Group: 9  Number of Patients on Unit:11      Patient attended group:Yes  Reason for Absence:  Intervention for patient absence:        Type of Group:   Wrap-Up/Relaxation    Patient's Goal: See wrap up group sheet    Participation Level:    active           Patient Response to Learning: Yes    Patient's Behavior: Pleasant    Is Patient Social/Interacting: No    Relaxation:   Television:No   Reading:No   Game/Puzzle:No   Phone: No       Notes/Comments:      Please see patient's wrap up group sheet for patient's comments       Electronically signed by Estefani Dockery on 4/15/22 at 11:46 PM CDT

## 2022-04-16 NOTE — PROGRESS NOTES
Group Therapy Note    Start Time: 800  End Time:  952  Number of Participants: 7    Type of Group: Community Meeting       Patient's Goal:        Notes:  Patient didn't attend group today    Participation Level:  Active Listener       Participation Quality: Appropriate      Thought Process/Content: Logical      Affective Functioning: Congruent      Mood: calm      Level of consciousness:  Alert      Modes of Intervention: Support      Discipline Responsible: Behavioral Health Tech II      Signature:  Duane Ocampo

## 2022-04-16 NOTE — H&P
HISTORY AND PHYSICAL    Sylvestermagdy Garcia is a 36 y.o.  male admitted through ED for depression and having vision of killing himself. He was intoxicated in the ED and allowed to sober up and still having SI. It is unsure when the symptoms started. Affect is flat and and he only answers with yes or no answers. This is all the information the patient provides. HISTORY:    Patient Active Problem List   Diagnosis    Anxiety disorder, unspecified    Bipolar affective disorder, currently depressed, moderate ([address]    Tobacco use disorder    Bipolar I disorder, most recent episode depressed, severe without psychotic features ([address]    Persistent mood (affective) disorder, unspecified ([address]    Bipolar disorder, current episode depressed, moderate ([address]    Other insomnia    ETOH abuse    Bipolar affective disorder, most recent episode mixed ([address]       Past Medical History:   Diagnosis Date    Alcohol abuse     Bipolar 1 disorder ([address]     Post traumatic stress disorder (PTSD)        Family History   Problem Relation Age of Onset    Other Father        Social History     Socioeconomic History    Marital status: Single     Spouse name: Not on file    Number of children: 0    Years of education: Not on file    Highest education level: Not on file   Occupational History    Not on file   Tobacco Use    Smoking status: Current Every Day Smoker     Packs/day: 1.00     Years: 10.00     Pack years: 10.00    Smokeless tobacco: Never Used   Vaping Use    Vaping Use: Never used   Substance and Sexual Activity    Alcohol use: Not Currently     Comment: Has not partook in ETOH since approx.  8/2020    Drug use: Not Currently     Comment: Not since 8/2020    Sexual activity: Yes     Partners: Female   Other Topics Concern    Not on file   Social History Narrative    Not on file     Social Determinants of Health     Financial Resource Strain:     Difficulty of Paying Living Expenses: Not on file Food Insecurity:     Worried About Running Out of Food in the Last Year: Not on file    Leon of Food in the Last Year: Not on file   Transportation Needs:     Lack of Transportation (Medical): Not on file    Lack of Transportation (Non-Medical): Not on file   Physical Activity:     Days of Exercise per Week: Not on file    Minutes of Exercise per Session: Not on file   Stress:     Feeling of Stress : Not on file   Social Connections:     Frequency of Communication with Friends and Family: Not on file    Frequency of Social Gatherings with Friends and Family: Not on file    Attends Taoism Services: Not on file    Active Member of [address] [place] [address] [employer] or Organizations: Not on file    Attends Club or Organization Meetings: Not on file    Marital Status: Not on file   Intimate Partner Violence:     Fear of Current or Ex-Partner: Not on file    Emotionally Abused: Not on file    Physically Abused: Not on file    Sexually Abused: Not on file   Housing Stability:     Unable to Pay for Housing in the Last Year: Not on file    Number of [place] in the Last Year: Not on file    Unstable Housing in the Last Year: Not on file       Prior to Admission medications    Medication Sig Start Date End Date Taking?  Authorizing Provider   traZODone (DESYREL) 50 MG tablet Take 1 tablet by mouth nightly as needed for Sleep 2/10/22 3/12/22  TYRESE Sierra   buPROPion (WELLBUTRIN XL) 150 MG extended release tablet Take 1 tablet by mouth daily 2/10/22 3/12/22  TYREES Sierra   vitamin D (ERGOCALCIFEROL) 1.25 MG (03143 UT) CAPS capsule Take 1 capsule by mouth once a week  Patient not taking: Reported on 4/14/2022 6/20/20   TYRESE Sierra         Current Facility-Administered Medications:     buPROPion (WELLBUTRIN XL) extended release tablet 150 mg, 150 mg, Oral, Daily, Anand Quintero MD, 150 mg at 04/16/22 0801    divalproex (DEPAKOTE ER) extended release tablet 500 mg, 500 mg, Oral, Daily, Estevan Das MD, 500 mg at 04/16/22 0800    traZODone (DESYREL) tablet 50 mg, 50 mg, Oral, Nightly, Estevan Das MD, 50 mg at 04/15/22 2211    hydrOXYzine (ATARAX) tablet 25 mg, 25 mg, Oral, TID PRN, Estevan Das MD, 25 mg at 04/16/22 1010    acetaminophen (TYLENOL) tablet 650 mg, 650 mg, Oral, Q4H PRN, Estevan Das MD    polyethylene glycol [facility]) packet 17 g, 17 g, Oral, Daily PRN, Estevan aDs MD    nicotine (NICODERM CQ) 21 MG/24HR 1 patch, 1 patch, TransDERmal, Daily, Estevan Das MD, 1 patch at 04/16/22 0802    Patient has no known allergies. REVIEW OF SYSTEMS:    Review of Systems   Constitutional: Negative. HENT: Negative. Eyes: Negative. Respiratory: Negative. Cardiovascular: Negative. Gastrointestinal: Negative. Genitourinary: Negative. Musculoskeletal: Negative. Skin: Negative. Neurological: Negative. Psychiatric/Behavioral: Positive for suicidal ideas. Depression       PHYSICAL EXAM:    /66   Pulse 92   Temp 98.1 °F (36.7 °C)   Resp 20   SpO2 97%     Physical Exam    No results found for this or any previous visit (from the past 24 hour(s)). ASSESSMENT:  1. Depression with suicidal ideation    PLAN:    1. He is admitted to [facility]. Lab results and home meds reviewed. VSS. Continue plan of care per psych. Alter treatment plan as needed.         TYRESE Weir,   4/16/2022

## 2022-04-16 NOTE — PROGRESS NOTES
[facility] Adult Unit Daily Assessment  Nursing Progress Note    Room: [facility]/607-02   Name: Jaida Carrero   Age: 36 y.o. Gender: male   Dx: ETOH abuse  Precautions: suicide risk  Inpatient Status: voluntary       SLEEP:    Sleep Quality Fair  Sleep Medications: Yes trazodone 50 mg  PRN Sleep Meds: No       MEDICAL:    Other PRN Meds: No   Med Compliant: Yes  Accu-Chek: No  Oxygen/CPAP/BiPAP: No  CIWA/CINA: No   PAIN Assessment: none  Side Effects from medication: No    COVID Teaching:    Is Patient experiencing any respiratory symptoms (headache, fever, body aches, cough. Janet Graven ): no  Patient educated by nursing to practice social distancing, wear masks, wash hands frequently: yes    Protective Factors:    Patient identifies protective factors with nursing staff as follows: Identifies reasons for living: Yes   Supportive Social Network or family: Yes    Belief that suicide is immoral/high spirituality: Yes   Responsibility to family or others/living with family: Yes   Fear of death or dying due to pain and suffering: Yes   Engaged in work or school: Yes     If Patient is unable to identify, reason why? PSYCH:    Depression: 10   Anxiety: 10   SI denies suicidal ideation   HI Negative for homicidal ideation      AVH:Absent      GENERAL:    Appetite: decreased   Percent Meals: janeth   Social: No   Speech: normal   Appearance: appropriately dressed, appropriately groomed, good hygiene and healthy looking    GROUP:    Group Participation: No  Participation Quality: None    Notes: Pt was in his room during this interview,he is calm and cooperative. He denies SI,but does say he has visual hallucinations of him killing himself and that he will suddenly not know where he is. To[place] he came out of his room ,had a snack and went right back to his room. Will continue to monitor.

## 2022-04-16 NOTE — PLAN OF CARE
Problem: Depressive Behavior With or Without Suicide Precautions:  Goal: Able to verbalize acceptance of life and situations over which he or she has no control  Outcome: Ongoing  Goal: Able to verbalize and/or display a decrease in depressive symptoms  Outcome: Ongoing  Goal: Ability to disclose and discuss suicidal ideas will improve  Outcome: Ongoing  Goal: Able to verbalize support systems  Outcome: Ongoing  Goal: Absence of self-harm  Outcome: Ongoing  Goal: Patient specific goal  Outcome: Ongoing  Goal: Participates in care planning  Outcome: Ongoing     Problem: Substance Abuse:  Goal: Absence of drug withdrawal signs and symptoms  Outcome: Ongoing  Goal: Participates in care planning  Outcome: Ongoing  Goal: Patient specific goal  Outcome: Ongoing

## 2022-04-16 NOTE — PROGRESS NOTES
Department of Psychiatry  Attending Progress Note     Chief complaint: \"Still feeling depressed and anxious\"    SUBJECTIVE:   Chart reviewed, discussed with the team. No major issues overnight. Patient is med-compliant. No SEs. Performs ADLs. Social and goes to groups. Patient seen in his room this morning. Denies SI/HI/AVH. Rates depression 8/10, anxiety 10/10. Affect somewhat incongruent. Slept 6h. Denies physical complaints. States he would like something for anxiety. Discussed as needed Atarax. No other concerns. Discussed coping skills. OBJECTIVE    Physical  Wt Readings from Last 3 Encounters:   02/05/22 132 lb 9.6 oz (60.1 kg)   06/12/20 134 lb (60.8 kg)   04/29/18 148 lb 12.8 oz (67.5 kg)     Temp Readings from Last 3 Encounters:   04/16/22 98.1 °F (36.7 °C)   02/10/22 96.7 °F (35.9 °C) (Temporal)   06/16/20 98.7 °F (37.1 °C) (Temporal)     BP Readings from Last 3 Encounters:   04/16/22 110/66   02/10/22 99/83   06/16/20 120/63     Pulse Readings from Last 3 Encounters:   04/16/22 92   02/10/22 91   06/16/20 89        Review of Systems: 14-point review of systems negative except as described above    Mental Status Examination:   Appearance: Stated age. Long hair. Thin. Gait not assessed. No abnormal movements or tremor. Behavior: Calm, cooperative  Speech: Normal in tone, volume, and quality. No slurring, dysarthria or pressured speech noted. Mood: \"Depressed and anxious \"  Affect: Mood incongruent. Thought Process: Appears linear. Thought Content: Denies suicidal and homicidal ideation. No overt delusions or paranoia appreciated. Perceptions: Denies auditory or visual hallucinations at present time. Not responding to internal stimuli. Concentration: Intact. Orientation: to person, place, date, and situation. Language: Intact. Fund of information: Intact. Memory: Recent and remote appear intact. Neurovegitative: Fair appetite and sleep. Insight: Limited.    Judgment: Limited. Data  Lab Results   Component Value Date    WBC 8.3 04/14/2022    HGB 15.4 04/14/2022    HCT 46.7 04/14/2022    MCV 89.3 04/14/2022     04/14/2022      Lab Results   Component Value Date     04/14/2022    K 4.1 04/14/2022     04/14/2022    CO2 26 04/14/2022    BUN 11 04/14/2022    CREATININE 0.8 04/14/2022    GLUCOSE 86 04/14/2022    CALCIUM 9.1 04/14/2022    PROT 7.6 04/14/2022    LABALBU 5.3 (H) 04/14/2022    BILITOT <0.2 04/14/2022    ALKPHOS 59 04/14/2022    AST 13 04/14/2022    ALT <5 (A) 04/14/2022    LABGLOM >60 04/14/2022    GFRAA >59 04/14/2022    GLOB 3.0 02/03/2016       Medications    Current Facility-Administered Medications:     buPROPion (WELLBUTRIN XL) extended release tablet 150 mg, 150 mg, Oral, Daily, Sebastian Villatoro MD, 150 mg at 04/16/22 0801    divalproex (DEPAKOTE ER) extended release tablet 500 mg, 500 mg, Oral, Daily, Sebastian Villatoro MD, 500 mg at 04/16/22 0800    traZODone (DESYREL) tablet 50 mg, 50 mg, Oral, Nightly, Sebastian Villatoro MD, 50 mg at 04/15/22 2211    hydrOXYzine (ATARAX) tablet 25 mg, 25 mg, Oral, TID PRN, Sebastian Villatoro MD, 25 mg at 04/16/22 1010    acetaminophen (TYLENOL) tablet 650 mg, 650 mg, Oral, Q4H PRN, Sebastian Villatoro MD    polyethylene glycol [facility]) packet 17 g, 17 g, Oral, Daily PRN, Sebastian Villatoro MD    nicotine (NICODERM CQ) 21 MG/24HR 1 patch, 1 patch, TransDERmal, Daily, Sebastian Villatoro MD, 1 patch at 04/16/22 0802    ASSESSMENT AND PLAN  DSM 5 DIAGNOSIS  Impression  Bipolar depression  Anxiety unspecified  Insomnia unspecified  Tobacco use disorder  Failure to thrive    No significant improvement. Continue to observe. Engage in psychotherapy and recreational activities. Plan:   1. Psychiatric Medications:   Continue current psychotropic medications as recommended. Monitor for side effects.   The risks, benefits, side effects, indications, contraindications, alternatives and adverse effects of the medications have been discussed with patient. 2. Continue to provide supportive psychotherapy. Encourage socialization and participation in recreational activities. Work on coping skills. 3. Medical Issues:    Continue medical monitoring by Dr. Kevin York and associates. 4. Disposition:     to provide outpatient resources and facilitate disposition.      Amount of time spent with patient:      35 minutes with greater than 50 % of the time spent in counseling and collaboration of care

## 2022-04-16 NOTE — PROGRESS NOTES
[facility] Adult Unit Daily Assessment  Nursing Progress Note    Room: [facility]/607-02   Name: Christopher Gambino   Age: 36 y.o. Gender: male   Dx: ETOH abuse  Precautions: suicide risk  Inpatient Status: voluntary       SLEEP:    Sleep Quality Good  Sleep Medications: Yes   PRN Sleep Meds: No       MEDICAL:    Other PRN Meds: No   Med Compliant: Yes  Accu-Chek: No  Oxygen/CPAP/BiPAP: No  CIWA/CINA: Yes   PAIN Assessment: none  Side Effects from medication: No    COVID Teaching:    Is Patient experiencing any respiratory symptoms (headache, fever, body aches, cough. Katey Hernandez ): no  Patient educated by nursing to practice social distancing, wear masks, wash hands frequently: yes    Protective Factors:    Patient identifies protective factors with nursing staff as follows: Identifies reasons for living: Yes   Supportive Social Network or family: Yes    Belief that suicide is immoral/high spirituality: Yes   Responsibility to family or others/living with family: Yes   Fear of death or dying due to pain and suffering: Yes   Engaged in work or school: Yes     If Patient is unable to identify, reason why? na      PSYCH:    Depression: 8   Anxiety: 10   SI denies suicidal ideation but still having visions of killing himself. HI Negative for homicidal ideation      AVH:denies not visual but having visions. Patient states \"I don't want to hurt myself but I still see visions of killing myself. I don't want to though. \"      GENERAL:    Appetite: good   Percent Meals: 75%   Social: No   Speech: normal   Appearance: appropriately dressed and healthy looking    GROUP:    Group Participation: no  Participation Quality: minimal    Notes: Patient is alert and oriented x 4. Pleasant, calm, and cooperative. Wearing hospital attire. Well groomed. Did not eat breakfast but did come out afterwards and eat a snack. Did not attend group. Reports he slept well last night, no problems with appetite.  Compliant with medications this am. Not social, is withdrawn and guarded during interview. . Reports feelings of low energy and no motivation. Fair eye contact and concentration during interview. Denies suicidal ideation, homicidal ideation, and auditory and visual hallucinations, however is having visions. No evidence of delusions or paranoia. Does not seem to be responding to internal stimuli. Patient states \"I don't want to hurt myself but I still see visions of killing myself. I don't want to though. \" Denies active suicidal thoughts or plan.      Electronically signed by Lissette Lopez RN on 4/16/22 at 10:30 AM CDT

## 2022-04-17 PROCEDURE — 1240000000 HC EMOTIONAL WELLNESS R&B

## 2022-04-17 PROCEDURE — 6370000000 HC RX 637 (ALT 250 FOR IP): Performed by: PSYCHIATRY & NEUROLOGY

## 2022-04-17 RX ADMIN — TRAZODONE HYDROCHLORIDE 50 MG: 50 TABLET ORAL at 21:00

## 2022-04-17 RX ADMIN — DIVALPROEX SODIUM 500 MG: 500 TABLET, EXTENDED RELEASE ORAL at 07:48

## 2022-04-17 RX ADMIN — BUPROPION HYDROCHLORIDE 150 MG: 150 TABLET, EXTENDED RELEASE ORAL at 07:48

## 2022-04-17 NOTE — PROGRESS NOTES
[facility] Adult Unit Daily Assessment  Nursing Progress Note    Room: 0607/607-02   Name: Lorenzo Allen   Age: 36 y.o. Gender: male   Dx: ETOH abuse  Precautions: suicide risk seizure precautions  Inpatient Status: voluntary       SLEEP:    Sleep Quality Good  Sleep Medications: Yes trazodone 50 mg  PRN Sleep Meds: no      MEDICAL:    Other PRN Meds: Yes atarax 25mg  Med Compliant: Yes  Accu-Chek: No  Oxygen/CPAP/BiPAP: No  CIWA/CINA: Yes [id number] Assessment: none  Side Effects from medication: No    COVID Teaching:    Is Patient experiencing any respiratory symptoms (headache, fever, body aches, cough. Karissa Crofton ): no  Patient educated by nursing to practice social distancing, wear masks, wash hands frequently: yes    Protective Factors:    Patient identifies protective factors with nursing staff as follows: Identifies reasons for living: Yes   Supportive Social Network or family: Yes    Belief that suicide is immoral/high spirituality: Yes   Responsibility to family or others/living with family: Yes   Fear of death or dying due to pain and suffering: Yes   Engaged in work or school: No     If Patient is unable to identify, reason why? N/A      PSYCH:    Depression: 3   Anxiety: 8   SI denies suicidal ideation   HI Negative for homicidal ideation      AVH:Absent      GENERAL:    Appetite: improved   Percent Meals: 75%   Social: Yes   Speech: normal   Appearance: appropriately dressed, appropriately groomed, good hygiene and healthy looking    GROUP:    Group Participation: Yes  Participation Quality: Active Listener    Notes: Pt was in the dining area during this interview,he was pleasant and cooperative. He was observed tonight watching TV with peers and socializing,he took a shower. He reports his mood has improved since his admission. He denies AVH and does not feel suicidal.He does report some anxiety. Will continue to monitor.

## 2022-04-17 NOTE — PROGRESS NOTES
[facility] Adult Unit Daily Assessment  Nursing Progress Note     Room: [facility]607-02   Name: Sudhir Mario   Age: 36 y.o. Gender: male   Dx: ETOH abuse  Precautions: suicide risk  Inpatient Status: voluntary         SLEEP:     Sleep Quality Good  Sleep Medications: Yes   PRN Sleep Meds: No         MEDICAL:     Other PRN Meds: No   Med Compliant: Yes  Accu-Chek: No  Oxygen/CPAP/BiPAP: No  CIWA/CINA: Yes   PAIN Assessment: none  Side Effects from medication: No     COVID Teaching:     Is Patient experiencing any respiratory symptoms (headache, fever, body aches, cough. Tej Cho ): no  Patient educated by nursing to practice social distancing, wear masks, wash hands frequently: yes     Protective Factors:     Patient identifies protective factors with nursing staff as follows: Identifies reasons for living: Yes              Supportive Social Network or family: Yes               Belief that suicide is immoral/high spirituality: Yes              Responsibility to family or others/living with family: Yes              Fear of death or dying due to pain and suffering: Yes              Engaged in work or school: Yes                If Patient is unable to identify, reason why? na        PSYCH:     Depression: 2  Anxiety: 1   SI denies suicidal ideation HI Negative for homicidal ideation      AVH:denies   GENERAL:     Appetite: good   Percent Meals: 75%   Social: No   Speech: normal   Appearance: appropriately dressed and healthy looking     GROUP:     Group Participation: no  Participation Quality: minimal     Notes: Patient is alert and oriented x 4. Pleasant, calm, and cooperative. Wearing hospital attire. Well groomed. Did not attend group. Reports he slept well last night, no problems with appetite. Reports mood has improved since hospital stay.  Compliant with medications this am. Not social, is withdrawn and guarded during interview and has been isolated to room with loss of interest. Good eye contact and concentration during interview. Denies suicidal ideation, homicidal ideation, and auditory and visual hallucinations. No evidence of delusions or paranoia. Does not seem to be responding to internal stimuli.     Electronically signed by Robin Rinaldi RN on 4/17/22 at 10:54 AM CDT

## 2022-04-17 NOTE — GROUP NOTE
Group Therapy Note    Date: 2022    Group Start Time: 1200  Group End Time: 1300  Group Topic: Art Therapy     [address]    Kacie Landon RN        Group Therapy Note    Attendees: 6         Patient's Goal:  ***    Notes:  ***    Status After Intervention:  {Status After Intervention:879814982}    Participation Level: {Participation Level:511513645}    Participation Quality: {[facility] PARTICIPATION QUALITY:633120080}      Speech:  {[facility] CD_SPEECH:39966}      Thought Process/Content: {Thought Process/Content:535909116}      Affective Functioning: {Affective Functionin}      Mood: {Mood:875038441}      Level of consciousness:  {Level of consciousness:186261738}      Response to Learning: {[facility] Responses to Learnin}      Endings: {[facility] Endings:79509}    Modes of Intervention: {[facility] Modes of Intervention:712327272}      Discipline Responsible: {[facility] Multidisciplinary:801873045}      Signature:  Rufina Philippe RN

## 2022-04-17 NOTE — PROGRESS NOTES
Group Note    Number of Participants in Group: 12  Number of Patients on Unit:13      Patient attended group:Yes  Reason for Absence:  Intervention for patient absence:        Type of Group:   Wrap-Up/Relaxation    Patient's Goal: See wrap up group sheet    Participation Level:     Active Listener, Interactive, Monopolizing, Minimal and None           Patient Response to Learning: Yes    Patient's Behavior: Cooperative    Is Patient Social/Interacting: Yes    Relaxation:   Television:Yes   Reading:No   Game/Puzzle:No   Phone: Yes       Notes/Comments:      Please see patient's wrap up group sheet for patient's comments       Electronically signed by Suzi Montero RN on 4/16/22 at 11:32 PM CDT

## 2022-04-17 NOTE — PROGRESS NOTES
Group Therapy Note    Start Time: 800  End Time:  258  Number of Participants: 7    Type of Group: Community Meeting       Patient's Goal:  \"focusing\"      Notes:      Participation Level:  Active Listener       Participation Quality: Appropriate      Thought Process/Content: Logical      Affective Functioning: Congruent      Mood: calm      Level of consciousness:  Alert      Modes of Intervention: Support      Discipline Responsible: Behavioral Health Tech II      Signature:  Nery De Anda

## 2022-04-18 PROCEDURE — 99233 SBSQ HOSP IP/OBS HIGH 50: CPT | Performed by: PSYCHIATRY & NEUROLOGY

## 2022-04-18 PROCEDURE — 6370000000 HC RX 637 (ALT 250 FOR IP): Performed by: PSYCHIATRY & NEUROLOGY

## 2022-04-18 PROCEDURE — 1240000000 HC EMOTIONAL WELLNESS R&B

## 2022-04-18 RX ADMIN — DIVALPROEX SODIUM 500 MG: 500 TABLET, EXTENDED RELEASE ORAL at 09:05

## 2022-04-18 RX ADMIN — TRAZODONE HYDROCHLORIDE 50 MG: 50 TABLET ORAL at 21:23

## 2022-04-18 RX ADMIN — BUPROPION HYDROCHLORIDE 150 MG: 150 TABLET, EXTENDED RELEASE ORAL at 09:05

## 2022-04-18 NOTE — PROGRESS NOTES
Treatment Team Note:     SW met with [place] team to discuss Pts TX and DC plans.      Progress/Behavior/Group Attendance: TBD     Target Symptoms/Reason for admission: Patient admitted to [facility] due to:Pt states \"I keep having flashes of forgetting where I'm at. \" pt oriented to person, time, situation, and knows he is in [place] [address] but is unable to state where      Diagnoses: Bipolar disorder, most recent episode mixed, Stimulants use disorder, in sustained remission, Alcohol use disorder, in sustained remission, Cannabis use disorder, in sustained remission, Tobacco use disorder  UDS: Neg  BAL:  90     AftercarePlan: [address]     Pt lives with: SW will meet with pt to gather information.     Collateral obtained from: SW will meet with pt to gather release of information.   On:     Family Session: JOSE MIGUEL     Misc:

## 2022-04-18 NOTE — PROGRESS NOTES
[facility] Adult Unit Daily Assessment  Nursing Progress Note    Room: [facility]607-02   Name: Phebe Apgar   Age: 36 y.o. Gender: male   Dx: ETOH abuse  Precautions: suicide risk  Inpatient Status: involuntary       SLEEP:    Sleep Quality Good  Sleep Medications: Yes   PRN Sleep Meds: Yes       MEDICAL:    Other PRN Meds: No   Med Compliant: Yes  Accu-Chek: Yes  Oxygen/CPAP/BiPAP: No  CIWA/CINA: Yes   PAIN Assessment: none  Side Effects from medication: No    COVID Teaching:    Is Patient experiencing any respiratory symptoms (headache, fever, body aches, cough. Waylon Jones ): no  Patient educated by nursing to practice social distancing, wear masks, wash hands frequently: yes    Protective Factors:    Patient identifies protective factors with nursing staff as follows: Identifies reasons for living: Yes   Supportive Social Network or family: Yes    Belief that suicide is immoral/high spirituality: Yes   Responsibility to family or others/living with family: Yes   Fear of death or dying due to pain and suffering: Yes   Engaged in work or school: No     If Patient is unable to identify, reason why? PSYCH:    Depression: 4   Anxiety: 4   SI denies suicidal ideation   HI Negative for homicidal ideation      AVH:Absent      GENERAL:    Appetite: improved   Percent Meals: 75%   Social: No   Speech: hesitant   Appearance: appropriately dressed and healthy looking    GROUP:    Group Participation: No  Participation Quality: None    Notes:       Pt has been out of his room more today,  He reports sleeping well last night. He has attended some groups. He is somewhat social with peers. He is cooperative with staff. He is compliant with medications.   He denies SI HI and avh      0Electronically signed by Kuldeep Sahu RN on 22 at 3:04 PM CDT

## 2022-04-18 NOTE — PROGRESS NOTES
COLLATERAL DATA    Collateral Obtained From : Lady Valdovinos, Mother([phone])  Date :4/18/2022    CRITERIA    I. Immediate Stressors & Time Episode Began: Mother stated pt has ongoing substance use history. Pt has been to multiple rehabilitation facilities none of which has helped. II. Diagnosis/Hx Compliance with Meds: pt has been diagnosed with depression and prescribed medication for diagnosis. III. Treatment Hx: Pt was provided psychotropic medication but he is not compliant with these medications. IV. Family Hx of Psychiatric Issues: Pt's father completed suicide in 2009. V. Substance Use: pt has history of alcohol use and polysubstance use. Evidenced by multiple substance use treatment efforts. VI. Pending Legal Issues: Pt was discovered by police in a well know location for meth use. Pt was arrested and charged with possession of methamphetamine and is currently on probation for these charges      VII. Safety Issues: Mother feels for her and her husbands safety pt can not return to this location. He also can not live with Grandmother due to unpredictable behavior. VIII. Additional Info: Mother states pt can return to [address] [place] [address] where he has been living or to [place] [address] when discharged.

## 2022-04-18 NOTE — RESEARCH
SW attempted to call pt's mom, Judy Plummer @[phone], to obtain collateral information about the pt, but it went to voicemail. SW left a message, including the unit's contact number, requesting a call back at her earliest convenience.

## 2022-04-18 NOTE — PROGRESS NOTES
Department of Psychiatry  Attending Progress Note      SUBJECTIVE:    36 y.o. male with previous psychiatric history of bipolar disorder, posttraumatic stress disorder, polysubstance abuse, who has been admitted to our psychiatric unit secondary to suicidal ideations. Patient has been seen in treatment team room with presence of the patient's nurse. Patient reported that his condition significantly improved during this hospital stay and his mood is \"good\" today. He endorses good appetite and good quality of sleep during the last night. Patient is compliant with currently prescribed medications and denies any side effects. He  endorses mild feeling of anxiety, and rated level of his anxiety as 3-4 out of 10, with 10 being the worst.  Patient denies any other affective symptomatology today. He attends group activities in the unit and participates in some of those activities. Patient is social with medical staff and other patients in the unit. He performs his ADLs daily. Patient denies current active suicidal or homicidal ideations, denies any plans. Also, he denies any auditory and visual hallucinations. At the end of the interview, patient reported that he does not want to return back to rehabilitation facility, said that he wants to return back home and stay with his parents. OBJECTIVE    Physical  VITALS:  /70   Pulse 85   Temp 98.1 °F (36.7 °C) (Temporal)   Resp 16   SpO2 96%   TEMPERATURE:  Current - Temp: 98.1 °F (36.7 °C);  Max - Temp  Av.2 °F (36.8 °C)  Min: 97.4 °F (36.3 °C)  Max: 99.1 °F (37.3 °C)  RESPIRATIONS RANGE: Resp  Av  Min: 16  Max: 16  PULSE RANGE: Pulse  Av  Min: 74  Max: 85  BLOOD PRESSURE RANGE:  Systolic (31SOZ), IMN:272 , Min:105 , IESHA:766   ; Diastolic (24CGM), IBT:11, Min:59, Max:73    PULSE OXIMETRY RANGE: SpO2  Av.7 %  Min: 96 %  Max: 99 %    Review of Systems: 14 point review of systems is negative    Psychological ROS: Positive for presence of mild anxiety    Mental Status Examination:    Appearance: Appropriately groomed and in hospital attire. Made good eye contact. Behavior: Calm, cooperative with interview, socially appropriate. No psychomotor retardation or agitation appreciated. Gait unremarkable. Speech: Normal in tone, volume, and quality. Mood: \"Good\"   Affect: Mood congruent. Range is mildly restricted  Thought Process: Mostly linear and goal oriented  Thought Content: Patient does not have any current active suicidal and homicidal ideations. No overt delusions or paranoia appreciated. Perceptions: Seems patient does not have any auditory or visual hallucinations at present time. Patient did not appear to be responding to internal stimuli. Orientation: to person, place, date, and situation. Alert. Impulsivity: Improved  Neurovegitative: Good appetite, good sleep  Insight: Limited  Judgment: Limited    Data  No new lab test results to review    Medications  Current Facility-Administered Medications: buPROPion (WELLBUTRIN XL) extended release tablet 150 mg, 150 mg, Oral, Daily  divalproex (DEPAKOTE ER) extended release tablet 500 mg, 500 mg, Oral, Daily  traZODone (DESYREL) tablet 50 mg, 50 mg, Oral, Nightly  hydrOXYzine (ATARAX) tablet 25 mg, 25 mg, Oral, TID PRN  acetaminophen (TYLENOL) tablet 650 mg, 650 mg, Oral, Q4H PRN  polyethylene glycol (GLYCOLAX) packet 17 g, 17 g, Oral, Daily PRN  nicotine (NICODERM CQ) 21 MG/24HR 1 patch, 1 patch, TransDERmal, Daily    ASSESSMENT AND PLAN    DSM 5 DIAGNOSIS:   Bipolar disorder, most recent episode mixed  Stimulants use disorder, in sustained remission  Alcohol use disorder, in sustained remission  Cannabis use disorder, in sustained remission  Tobacco use disorder      Plan:   1. Psychiatric Medications:   Continue current psychotropic medications as recommended. Patient denies side effect of currently prescribed medications.   No changes with dose of prescribed psychotropic medications today. 2. Medical Issues:    Continue medical monitoring by Dr. Ludin Jaime and associates. 3. Disposition:    Discharge patient home when he will be in stable mental condition and adjustment psychotropic medications will be done.   Preliminary day of discharge is tomorrow on 04/19/2022     Amount of time spent with patient:    35 minutes with greater than 50% of the time spent in counseling and collaboration of care

## 2022-04-18 NOTE — PROGRESS NOTES
[facility] Daily Assessment  Nursing Progress Note    Room: [facility]/607-02   Name: Rick Fermin   Age: 36 y.o. Gender: male   Dx: ETOH abuse  Precautions: suicide risk and seizure precautions  Inpatient Status: voluntary       SLEEP:    Sleep Quality Good  Sleep Medications: Yes   PRN Sleep Meds: No       MEDICAL:    Other PRN Meds: No   Med Compliant: Yes  Accu-Chek: No  Oxygen/CPAP/BiPAP: No  CIWA/CINA: Yes   PAIN Assessment: none  Side Effects from medication: No    COVID Teaching:    Is Patient experiencing any respiratory symptoms (headache, fever, body aches, cough. Clenton Reas ): no  Patient educated by nursing to practice social distancing, wear masks, wash hands frequently: yes    Protective Factors:    Patient identifies protective factors with nursing staff as follows: Identifies reasons for living: Yes   Supportive Social Network or family: Yes    Belief that suicide is immoral/high spirituality: Yes   Responsibility to family or others/living with family: Yes   Fear of death or dying due to pain and suffering: Yes   Engaged in work or school: Yes     If Patient is unable to identify, reason why? n/a      PSYCH:    Depression: 0   Anxiety: 0   SI denies suicidal ideation   HI Negative for homicidal ideation      AVH:Absent      GENERAL:    Appetite: good   Percent Meals: 75%   Social: No   Speech: normal   Appearance: appropriately dressed and healthy looking    GROUP:    Group Participation: Yes  Participation Quality: Active Listener    Notes:   Patient in room and has isolated this shift. He has not been social.  His interview behavior is withdrawn and guarded, but cooperative. Eye contact is fair. He is compliant with scheduled sleep medication and has completed group wrap up. He currently denies SI, HI, and AVH.       Electronically signed by Josey Perez LPN on 3/11/20 at 8:42 AM CDT

## 2022-04-18 NOTE — PLAN OF CARE
Problem: Depressive Behavior With or Without Suicide Precautions:  Goal: Able to verbalize support systems  4/18/2022 1611 by Kavitha Jimenez  Outcome: Ongoing      Group Therapy Note     Date: 4/18/2022  Start Time: 1043  End Time:  1600  Number of Participants: 10     Type of Group: Recovery     Wellness Binder Information  Module Name:  Emotional wellness  Session Number:  1     Patient's Goal:  validation of feelings     Notes:  pt acknowledged to have feelings validated it may be necessary to share feelings with others.      Status After Intervention:  Improved     Participation Level: Interactive     Participation Quality: Appropriate, Attentive, and Sharing        Speech:  normal        Thought Process/Content: Logical        Affective Functioning: Congruent        Mood: congruent        Level of consciousness:  Alert, Oriented x4, and Attentive        Response to Learning: Able to verbalize current knowledge/experience        Endings: None Reported     Modes of Intervention: Education        Discipline Responsible: Psychoeducational Specialist        Signature:  Kavitha Jimenez

## 2022-04-18 NOTE — PLAN OF CARE
Problem: Depressive Behavior With or Without Suicide Precautions:  Goal: Able to verbalize and/or display a decrease in depressive symptoms  4/18/2022 1148 by Elvin Bentley   Group Therapy Note     Date: 4/18/2022  Start Time: 1100  End Time:  1130  Number of Participants: 12     Type of Group: Psychoeducation     Wellness Binder Information  Module Name:  emotional wellness  Session Number:  5     Patient's Goal:  obstacles to emotional wellness     Notes:  pt acknowledged negative thinking as an obstacle to emotional wellness.      Status After Intervention:  Improved     Participation Level: Interactive     Participation Quality: Appropriate, Attentive, and Sharing        Speech:  normal        Thought Process/Content: Logical        Affective Functioning: Congruent        Mood: congruent        Level of consciousness:  Alert, Oriented x4, and Attentive        Response to Learning: Able to verbalize current knowledge/experience        Endings: None Reported     Modes of Intervention: Education        Discipline Responsible: Psychoeducational Specialist        Signature:  Elvin Bentley             Revision History                              Outcome: Ongoing

## 2022-04-18 NOTE — PLAN OF CARE
Problem: Depressive Behavior With or Without Suicide Precautions:  Goal: Able to verbalize acceptance of life and situations over which he or she has no control  Description: Able to verbalize acceptance of life and situations over which he or she has no control  Outcome: Ongoing  Goal: Able to verbalize and/or display a decrease in depressive symptoms  Description: Able to verbalize and/or display a decrease in depressive symptoms  Outcome: Ongoing  Goal: Ability to disclose and discuss suicidal ideas will improve  Description: Ability to disclose and discuss suicidal ideas will improve  Outcome: Ongoing  Goal: Able to verbalize support systems  Description: Able to verbalize support systems  Outcome: Ongoing  Goal: Absence of self-harm  Description: Absence of self-harm  Outcome: Ongoing  Goal: Patient specific goal  Description: Patient specific goal  Outcome: Ongoing  Goal: Participates in care planning  Description: Participates in care planning  Outcome: Ongoing     Problem: Substance Abuse:  Goal: Absence of drug withdrawal signs and symptoms  Description: Absence of drug withdrawal signs and symptoms  Outcome: Ongoing  Goal: Participates in care planning  Description: Participates in care planning  Outcome: Ongoing  Goal: Patient specific goal  Description: Patient specific goal  Outcome: Ongoing     Problem: Pain:  Description: Pain management should include both nonpharmacologic and pharmacologic interventions.   Goal: Control of acute pain  Description: Control of acute pain  Outcome: Ongoing  Goal: Control of chronic pain  Description: Control of chronic pain  Outcome: Ongoing

## 2022-04-18 NOTE — PROGRESS NOTES
Group Therapy Note    Start Time: 900  End Time:  276  Number of Participants: 14    Type of Group: Community Meeting       Patient's Goal:        Notes:  Did not participate      Participation Level:       Participation Quality:        Thought Process/Content:       Affective Functioning:       Mood:       Level of consciousness:        Modes of Intervention: Support      Discipline Responsible: Behavioral Health Tech II      Signature:  Bam Obrien

## 2022-04-19 VITALS
RESPIRATION RATE: 20 BRPM | OXYGEN SATURATION: 98 % | DIASTOLIC BLOOD PRESSURE: 64 MMHG | SYSTOLIC BLOOD PRESSURE: 109 MMHG | TEMPERATURE: 98 F | HEART RATE: 86 BPM

## 2022-04-19 LAB
TSH REFLEX FT4: 1.59 UIU/ML (ref 0.35–5.5)
VITAMIN B-12: 412 PG/ML (ref 211–946)
VITAMIN D 25-HYDROXY: 12.6 NG/ML

## 2022-04-19 PROCEDURE — 84443 ASSAY THYROID STIM HORMONE: CPT

## 2022-04-19 PROCEDURE — 36415 COLL VENOUS BLD VENIPUNCTURE: CPT

## 2022-04-19 PROCEDURE — 99239 HOSP IP/OBS DSCHRG MGMT >30: CPT | Performed by: PSYCHIATRY & NEUROLOGY

## 2022-04-19 PROCEDURE — 82306 VITAMIN D 25 HYDROXY: CPT

## 2022-04-19 PROCEDURE — 6370000000 HC RX 637 (ALT 250 FOR IP): Performed by: PSYCHIATRY & NEUROLOGY

## 2022-04-19 PROCEDURE — 5130000000 HC BRIDGE APPOINTMENT

## 2022-04-19 PROCEDURE — 82607 VITAMIN B-12: CPT

## 2022-04-19 RX ORDER — BUPROPION HYDROCHLORIDE 150 MG/1
150 TABLET ORAL DAILY
Qty: 30 TABLET | Refills: 0 | Status: ON HOLD | OUTPATIENT
Start: 2022-04-20 | End: 2022-09-09 | Stop reason: HOSPADM

## 2022-04-19 RX ORDER — ERGOCALCIFEROL 1.25 MG/1
50000 CAPSULE ORAL WEEKLY
Qty: 4 CAPSULE | Refills: 0 | Status: CANCELLED | OUTPATIENT
Start: 2022-04-19

## 2022-04-19 RX ORDER — TRAZODONE HYDROCHLORIDE 50 MG/1
50 TABLET ORAL NIGHTLY
Qty: 30 TABLET | Refills: 0 | Status: ON HOLD | OUTPATIENT
Start: 2022-04-19 | End: 2022-09-09 | Stop reason: SDUPTHER

## 2022-04-19 RX ORDER — HYDROXYZINE HYDROCHLORIDE 25 MG/1
25 TABLET, FILM COATED ORAL 3 TIMES DAILY PRN
Qty: 90 TABLET | Refills: 0 | Status: ON HOLD | OUTPATIENT
Start: 2022-04-19 | End: 2022-09-09 | Stop reason: HOSPADM

## 2022-04-19 RX ORDER — DIVALPROEX SODIUM 500 MG/1
500 TABLET, EXTENDED RELEASE ORAL DAILY
Qty: 30 TABLET | Refills: 0 | Status: ON HOLD | OUTPATIENT
Start: 2022-04-20 | End: 2022-09-09 | Stop reason: HOSPADM

## 2022-04-19 RX ADMIN — DIVALPROEX SODIUM 500 MG: 500 TABLET, EXTENDED RELEASE ORAL at 08:52

## 2022-04-19 RX ADMIN — HYDROXYZINE HYDROCHLORIDE 25 MG: 25 TABLET, FILM COATED ORAL at 08:52

## 2022-04-19 RX ADMIN — BUPROPION HYDROCHLORIDE 150 MG: 150 TABLET, EXTENDED RELEASE ORAL at 08:51

## 2022-04-19 NOTE — PLAN OF CARE
Problem: Depressive Behavior With or Without Suicide Precautions:  Goal: Able to verbalize support systems  Outcome: Ongoing    Group Therapy Note     Date: 4/19/2022  Start Time: 4740  End Time:  1600  Number of Participants: 6     Type of Group: Recovery     Wellness Binder Information  Module Name:  relapse prevention  Session Number:  4     Patient's Goal:  relapse prevention toolbox     Notes:  pt acknowledged use of positive coping skills as tools to help stay well.      Status After Intervention:  Improved     Participation Level: Interactive     Participation Quality: Appropriate, Attentive, and Sharing        Speech:  normal        Thought Process/Content: Logical        Affective Functioning: Congruent        Mood: congruent        Level of consciousness:  Alert, Oriented x4, and Attentive        Response to Learning: Able to verbalize current knowledge/experience        Endings: None Reported     Modes of Intervention: Education        Discipline Responsible: Psychoeducational Specialist        Signature:  Ramon Billings

## 2022-04-19 NOTE — DISCHARGE INSTR - DIET

## 2022-04-19 NOTE — PROGRESS NOTES
CLINICAL PHARMACY NOTE: MEDS TO BEDS    Total # of Prescriptions Filled: 4   The following medications were delivered to the patient:  · Trazodone 50 mg  · Hydroxyzine 25 mg  · Divalproex  mg  · Bupropion  mg    Additional Documentation:    Handed scripts to Mario Gillespie) at nurses station

## 2022-04-19 NOTE — PROGRESS NOTES
[facility] Adult Unit Daily Assessment  Nursing Progress Note    Room: [facility]/607-02   Name: Allyson Sepulveda   Age: 36 y.o. Gender: male   Dx: ETOH abuse  Precautions: suicide risk, fall risk and seizure precautions  Inpatient Status: voluntary       SLEEP:    Sleep Quality Good  Sleep Medications: Yes   PRN Sleep Meds: No       MEDICAL:    Other PRN Meds: No   Med Compliant: Yes  Accu-Chek: No  Oxygen/CPAP/BiPAP: No  CIWA/CINA: No   PAIN Assessment: none  Side Effects from medication: No    COVID Teaching:    Is Patient experiencing any respiratory symptoms (headache, fever, body aches, cough. Lorean Snare ): no  Patient educated by nursing to practice social distancing, wear masks, wash hands frequently: yes    Protective Factors:    Patient identifies protective factors with nursing staff as follows: Identifies reasons for living: Yes   Supportive Social Network or family: Yes    Belief that suicide is immoral/high spirituality: Yes   Responsibility to family or others/living with family: Yes   Fear of death or dying due to pain and suffering: Yes   Engaged in work or school: No     If Patient is unable to identify, reason why? n/a      PSYCH:    Depression: 0   Anxiety: 0   SI denies suicidal ideation   HI Negative for homicidal ideation      AVH:Absent      GENERAL:    Appetite: good   Percent Meals: 50%   Social: No   Speech: normal   Appearance: appropriately dressed and healthy looking    GROUP:    Group Participation: No  Participation Quality: None    Notes:     Patient is cooperative, Alert and Oriented x4, appears Withdrawn. Patient Rates Depression a 0 and Anxiety a 0 on a 0-10 scale, with 10 being the worst. Patient affect is Congruent and Flat. Exhibited a flat facial expression; maintained good  eye contact throughout interview. Patient denies having current [address]. Patient states,\" I think I am finally starting to feel better now\". Patient is compliant with medications. No signs of distress noted;  Patient observed resting in bed with eyes open after interview.          Electronically signed by Leeanne Batista RN on 4/18/22 at 11:44 PM CDT

## 2022-04-19 NOTE — PROGRESS NOTES
Progress Note  Sully De Souza  4/19/2022 7:47 AM  Subjective:   Admit Date:   4/14/2022      CC/ADMIT DX:       Interval History:   Reviewed overnight events and nursing notes. He has no new medical issues. I have reviewed all labs/diagnostics from the last 24hrs. ROS:   I have done a 10 point ROS and all are negative, except what is mentioned in the HPI. ADULT DIET; Regular    Medications:      buPROPion  150 mg Oral Daily    divalproex  500 mg Oral Daily    traZODone  50 mg Oral Nightly    nicotine  1 patch TransDERmal Daily           Objective:   Vitals: /73   Pulse 83   Temp 99 °F (37.2 °C)   Resp 16   SpO2 99%  No intake or output data in the 24 hours ending 04/19/22 0747  General appearance: alert and cooperative with exam  Extremities: extremities normal, atraumatic, no cyanosis or edema  Neurologic:  No obvious focal neurologic deficits. Skin: no rashes    Assessment and Plan:   Principal Problem:    ETOH abuse  Active Problems:    Depression with suicidal ideation    Bipolar affective disorder, most recent episode mixed ([address]  Resolved Problems:    * No resolved hospital problems. *        Plan:  1. Continue present medication(s)   2. Follow with Psych  3. He is medically stable. I will monitor for any changes or concerns. Discharge planning:   home     Reviewed treatment plans with the patient and/or family.              Electronically signed by Harper Wade MD on 4/19/2022 at 7:47 AM

## 2022-04-19 NOTE — PROGRESS NOTES
Discharge Note     Pt discharging on this date. Pt denies SI, HI, and AVH at this time. Pt reports improvement in behavior and is leaving unit in overall good condition. SW and pt discussed pt's follow up appointments and importance of attending appointments as scheduled, pt voiced understanding and agreement. Pt and SW also discussed pt safety plan and pt able to verbally identify: warning signs, coping strategies, places and people that help make the pt feel better/distract negative thoughts, friends/family/agencies/professionals the pt can reach out to in a crisis, and something that is important to the pt/worth living for. Pt provided the national suicide prevention hotline number (0-025-025-607-250-4097) as well as local community behavioral health [facility]) crisis number for emergencies (9-981-363-338-705-5860). Pt to follow up with:  [address] [place] [address]) on 04__/22__/22 @ 2:00pm. Patient will follow up with TYRESE Noguera at [facility] for medication management, patient will be seen on _04_/26__/22 @ 3:30pm for the med management appt.      Referral to out patient tobacco cessation counseling treatment:    Patient refused referral to outpatient tobacco cessation counseling    SW offered to assist pt with transportation, pt reports that she [facility]

## 2022-04-19 NOTE — PROGRESS NOTES
Group Therapy Note    Start Time: 1100  End Time:  1130  Number of Participants: 18    Type of Group: Community Meeting       Patient's Goal:  focusing      Notes:      Participation Level:  Active Listener       Participation Quality: Appropriate      Thought Process/Content: Logical      Affective Functioning: Congruent      Mood: calm      Level of consciousness:  Alert      Modes of Intervention: Support      Discipline Responsible: Behavioral Health Tech II      Signature:  Magdi Julien
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
## 2022-04-19 NOTE — PROGRESS NOTES
Behavioral Health   Discharge Note  Bridge Appointment completed: Reviewed Discharge Instructions with patient. Patient verbalizes understanding and agreement with the discharge plan using the teachback method. Referral for Outpatient Tobacco Cessation Counseling, upon discharge (nohemi X if applicable and completed):    ( )  Hospital staff assisted patient to call Quit Line or faxed referral                                   during hospitalization                  ( )  Recognizing danger situations (included triggers and roadblocks), if not completed on admission                    ( )  Coping skills (new ways to manage stress, exercise, relaxation techniques, changing routine, distraction), if not completed on admission                                                           ( )  Basic information about quitting (benefits of quitting, techniques in how to quit, available resources, if not completed on admission  ( ) Referral for counseling faxed to [facility]   ( ) Patient refused referral  ( ) Patient refused counseling  (x ) Patient refused smoking cessation medication upon discharge    Vaccinations (nohemi X if applicable and completed):  ( ) Patient states already received influenza vaccine elsewhere  ( ) Patient received influenza vaccine during this hospitalization  (x ) Patient refused influenza vaccine at this time      Pt discharged with followings belongings:       Valuables sent home with patient. Valuables retrieved from safe and returned to patient. Patient left department with  staff via  ambulation, discharged to  half way house via cab. Patient education on aftercare instructions: yes  Patient verbalize understanding of AVS:  yes. Suicidal Ideations? No AVH? denies HI?  Negative for homicidal ideation       Status EXAM upon discharge:  Status and Exam  Normal: No  Facial Expression: Flat  Affect: Appropriate,Congruent  Level of Consciousness: Alert  Mood:Normal: No  Mood: Sad  Motor Activity:Normal: No  Motor Activity: Decreased  Interview Behavior: Cooperative  Preception: [place] to Person,[place] to Time,[place] to Place,[place] to Situation  Attention:Normal: No  Attention: Distractible,Unable to Concentrate  Thought Processes: Circumstantial  Thought Content:Normal: No  Thought Content: Preoccupations  Hallucinations: None  Delusions: No  Delusions: Obsessions  Memory:Normal: No  Memory: Poor Recent,Poor Remote  Insight and Judgment: No  Insight and Judgment: Poor Judgment,Poor Insight  Present Suicidal Ideation: No  Present Homicidal Ideation: No      Patient discharged home in stable condition with no obvious s/s of distress voiced or noted. patient discharged with personal belongings and medication from our pharmacy. patinet discharged via cab back to half way house.

## 2022-04-19 NOTE — PLAN OF CARE
Problem: Depressive Behavior With or Without Suicide Precautions:  Goal: Ability to disclose and discuss suicidal ideas will improve  Outcome: Ongoing       Group Therapy Note     Date: 4/19/2022  Start Time: 1100  End Time:  1130  Number of Participants: 13     Type of Group: Psychoeducation     Wellness Binder Information  Module Name:  staying well  Session Number:  1     Patient's Goal:  daily maintenance and coping skills     Notes:  pt acknowledged use of positive coping skills daily to help stay well     Status After Intervention:  Improved     Participation Level: Interactive     Participation Quality: Appropriate, Attentive, and Sharing        Speech:  normal        Thought Process/Content: Logical        Affective Functioning: Congruent        Mood: congruent        Level of consciousness:  Alert, Oriented x4, and Attentive        Response to Learning: Able to verbalize current knowledge/experience        Endings: None Reported     Modes of Intervention: Education        Discipline Responsible: Psychoeducational Specialist        Signature:  Shireen Garcia

## 2022-04-20 NOTE — PROGRESS NOTES
Progress Note  Vani Wills  4/19/2022 10:34 PM  Subjective:   Admit Date:   4/14/2022      CC/ADMIT DX:       Interval History:   Reviewed overnight events and nursing notes. He hs no new physical complaints. I have reviewed all labs/diagnostics from the last 24hrs. ROS:   I have done a 10 point ROS and all are negative, except what is mentioned in the HPI. No diet orders on file    Medications:             Objective:   Vitals: /64   Pulse 86   Temp 98 °F (36.7 °C) (Temporal)   Resp 20   SpO2 98%  No intake or output data in the 24 hours ending 04/19/22 2234  General appearance: alert and cooperative with exam  Extremities: extremities normal, atraumatic, no cyanosis or edema  Neurologic:  No obvious focal neurologic deficits. Skin: no rashes    Assessment and Plan:   Principal Problem:    ETOH abuse  Active Problems:    Depression with suicidal ideation    Bipolar affective disorder, most recent episode mixed ([address]  Resolved Problems:    * No resolved hospital problems. *        Plan:  1. Continue present medication(s)   2. Follow with Psych  3. He remains medically stable. I will monitor for any changes or concerns. Discharge planning:   home     Reviewed treatment plans with the patient and/or family.              Electronically signed by Bienvenido Leigh MD on 4/19/2022 at 10:34 PM

## 2022-09-06 ENCOUNTER — HOSPITAL ENCOUNTER (INPATIENT)
Age: 32
LOS: 2 days | Discharge: HOME | DRG: 753 | End: 2022-09-09
Attending: EMERGENCY MEDICINE | Admitting: PSYCHIATRY & NEUROLOGY
Payer: MEDICAID

## 2022-09-06 DIAGNOSIS — R45.851 DEPRESSION WITH SUICIDAL IDEATION: Primary | ICD-10-CM

## 2022-09-06 DIAGNOSIS — F32.A DEPRESSION WITH SUICIDAL IDEATION: Primary | ICD-10-CM

## 2022-09-06 DIAGNOSIS — F10.920 ACUTE ALCOHOLIC INTOXICATION WITHOUT COMPLICATION (HCC): ICD-10-CM

## 2022-09-06 LAB
ALBUMIN SERPL-MCNC: 4.6 G/DL (ref 3.5–5.2)
ALP BLD-CCNC: 79 U/L (ref 40–130)
ALT SERPL-CCNC: 8 U/L (ref 5–41)
AMPHETAMINE SCREEN, URINE: NEGATIVE
ANION GAP SERPL CALCULATED.3IONS-SCNC: 21 MMOL/L (ref 7–19)
AST SERPL-CCNC: 31 U/L (ref 5–40)
BARBITURATE SCREEN URINE: NEGATIVE
BASOPHILS ABSOLUTE: 0.1 K/UL (ref 0–0.2)
BASOPHILS RELATIVE PERCENT: 1.4 % (ref 0–1)
BENZODIAZEPINE SCREEN, URINE: NEGATIVE
BILIRUB SERPL-MCNC: 0.6 MG/DL (ref 0.2–1.2)
BUN BLDV-MCNC: 16 MG/DL (ref 6–20)
BUPRENORPHINE URINE: NEGATIVE
CALCIUM SERPL-MCNC: 8.8 MG/DL (ref 8.6–10)
CANNABINOID SCREEN URINE: NEGATIVE
CHLORIDE BLD-SCNC: 101 MMOL/L (ref 98–111)
CO2: 23 MMOL/L (ref 22–29)
COCAINE METABOLITE SCREEN URINE: NEGATIVE
CREAT SERPL-MCNC: 0.7 MG/DL (ref 0.5–1.2)
EOSINOPHILS ABSOLUTE: 0 K/UL (ref 0–0.6)
EOSINOPHILS RELATIVE PERCENT: 0.3 % (ref 0–5)
ETHANOL: 194 MG/DL (ref 0–0.08)
ETHANOL: 75 MG/DL (ref 0–0.08)
GFR AFRICAN AMERICAN: >59
GFR NON-AFRICAN AMERICAN: >60
GLUCOSE BLD-MCNC: 93 MG/DL (ref 74–109)
HCT VFR BLD CALC: 40.6 % (ref 42–52)
HEMOGLOBIN: 14.1 G/DL (ref 14–18)
IMMATURE GRANULOCYTES #: 0 K/UL
LYMPHOCYTES ABSOLUTE: 1.7 K/UL (ref 1.1–4.5)
LYMPHOCYTES RELATIVE PERCENT: 22.2 % (ref 20–40)
Lab: NORMAL
MCH RBC QN AUTO: 29.9 PG (ref 27–31)
MCHC RBC AUTO-ENTMCNC: 34.7 G/DL (ref 33–37)
MCV RBC AUTO: 86 FL (ref 80–94)
METHADONE SCREEN, URINE: NEGATIVE
METHAMPHETAMINE, URINE: NEGATIVE
MONOCYTES ABSOLUTE: 0.2 K/UL (ref 0–0.9)
MONOCYTES RELATIVE PERCENT: 2.8 % (ref 0–10)
NEUTROPHILS ABSOLUTE: 5.7 K/UL (ref 1.5–7.5)
NEUTROPHILS RELATIVE PERCENT: 73 % (ref 50–65)
OPIATE SCREEN URINE: NEGATIVE
OXYCODONE URINE: NEGATIVE
PDW BLD-RTO: 12.2 % (ref 11.5–14.5)
PHENCYCLIDINE SCREEN URINE: NEGATIVE
PLATELET # BLD: 301 K/UL (ref 130–400)
PMV BLD AUTO: 10.5 FL (ref 9.4–12.4)
POTASSIUM SERPL-SCNC: 3.9 MMOL/L (ref 3.5–5)
PROPOXYPHENE SCREEN: NEGATIVE
RBC # BLD: 4.72 M/UL (ref 4.7–6.1)
SARS-COV-2, NAAT: NOT DETECTED
SODIUM BLD-SCNC: 145 MMOL/L (ref 136–145)
TOTAL PROTEIN: 7.2 G/DL (ref 6.6–8.7)
TRICYCLIC, URINE: NEGATIVE
WBC # BLD: 7.8 K/UL (ref 4.8–10.8)

## 2022-09-06 PROCEDURE — 82077 ASSAY SPEC XCP UR&BREATH IA: CPT

## 2022-09-06 PROCEDURE — 80306 DRUG TEST PRSMV INSTRMNT: CPT

## 2022-09-06 PROCEDURE — 80053 COMPREHEN METABOLIC PANEL: CPT

## 2022-09-06 PROCEDURE — 99285 EMERGENCY DEPT VISIT HI MDM: CPT

## 2022-09-06 PROCEDURE — 85025 COMPLETE CBC W/AUTO DIFF WBC: CPT

## 2022-09-06 PROCEDURE — 36415 COLL VENOUS BLD VENIPUNCTURE: CPT

## 2022-09-06 PROCEDURE — 87635 SARS-COV-2 COVID-19 AMP PRB: CPT

## 2022-09-06 ASSESSMENT — ENCOUNTER SYMPTOMS
VOMITING: 0
RHINORRHEA: 0
EYE REDNESS: 0
DIARRHEA: 0
VOICE CHANGE: 0
COUGH: 0
ABDOMINAL PAIN: 0
SHORTNESS OF BREATH: 0
EYE PAIN: 0

## 2022-09-06 ASSESSMENT — PAIN - FUNCTIONAL ASSESSMENT: PAIN_FUNCTIONAL_ASSESSMENT: NONE - DENIES PAIN

## 2022-09-06 NOTE — ED PROVIDER NOTES
[facility] EMERGENCY DEPT  EMERGENCY DEPARTMENT ENCOUNTER      Pt Name: Hermelinda Novoa  MRN: [id number]  [place] 1990  Date of evaluation: 9/6/2022  Provider: Reinier Lynn MD    CHIEF COMPLAINT       Chief Complaint   Patient presents with    Suicidal     Pt reports having thoughts of suicide today, pt states that he has a plan          HISTORY OF PRESENT ILLNESS   (Location/Symptom, Timing/Onset,Context/Setting, Quality, Duration, Modifying Factors, Severity)  Note limiting factors. Hermelinda Novoa is a 36 y.o. male who presents to the emergency department with complaint of suicidal ideation with plan to hang himself. Says it just started when he woke up this morning. No specific triggers. Has been off his medications for awhile. Was put in [facility] after they were prescribed so hasn't been on anything since April. Says he hasn't had any recent new or significant stressors and that these episodes seem to come on out of the blue. Prior medical records show history of bipolar disorder. HPI    NursingNotes were reviewed. REVIEW OF SYSTEMS    (2-9 systems for level 4, 10 or more for level 5)     Review of Systems   Constitutional:  Negative for fatigue and fever. HENT:  Negative for congestion, rhinorrhea and voice change. Eyes:  Negative for pain and redness. Respiratory:  Negative for cough and shortness of breath. Cardiovascular:  Negative for chest pain. Gastrointestinal:  Negative for abdominal pain, diarrhea and vomiting. Endocrine: Negative. Genitourinary: Negative. Musculoskeletal:  Negative for arthralgias and gait problem. Skin:  Negative for rash and wound. Neurological:  Negative for weakness and headaches. Hematological: Negative. All other systems reviewed and are negative. A complete review of systems was performed and is negative except as noted above in the HPI.        PAST MEDICAL HISTORY     Past Medical History:   Diagnosis Date    Alcohol abuse Bipolar 1 disorder (HCC)     Post traumatic stress disorder (PTSD)          SURGICAL HISTORY       Past Surgical History:   Procedure Laterality Date    OTHER SURGICAL HISTORY      cut tendon in right little finger         CURRENT MEDICATIONS       Previous Medications    BUPROPION (WELLBUTRIN XL) 150 MG EXTENDED RELEASE TABLET    Take 1 tablet by mouth daily    DIVALPROEX (DEPAKOTE ER) 500 MG EXTENDED RELEASE TABLET    Take 1 tablet by mouth daily    HYDROXYZINE (ATARAX) 25 MG TABLET    Take 1 tablet by mouth 3 times daily as needed for Anxiety    TRAZODONE (DESYREL) 50 MG TABLET    Take 1 tablet by mouth nightly       ALLERGIES     Patient has no known allergies. FAMILY HISTORY       Family History   Problem Relation Age of Onset    Other Father           SOCIAL HISTORY       Social History     Socioeconomic History    Marital status: Single    Number of children: 0   Tobacco Use    Smoking status: Every Day     Packs/day: 1.00     Years: 10.00     Pack years: 10.00     Types: Cigarettes    Smokeless tobacco: Never   Vaping Use    Vaping Use: Never used   Substance and Sexual Activity    Alcohol use: Not Currently     Comment: Has not partook in ETOH since approx. 8/2020    Drug use: Not Currently     Comment: Not since 8/2020    Sexual activity: Yes     Partners: Female       SCREENINGS    Clara Coma Scale  Eye Opening: Spontaneous  Best Verbal Response: Oriented  Best Motor Response: Obeys commands  [place]inson Coma Scale Score: 15        PHYSICAL EXAM    (up to 7 for level 4, 8 or more for level 5)     ED Triage Vitals [09/06/22 1803]   BP Temp Temp src Heart Rate Resp SpO2 Height Weight   117/80 98.4 °F (36.9 °C) -- (!) 115 18 95 % 5' 11\" (1.803 m) 132 lb (59.9 kg)       Physical Exam  Vitals and nursing note reviewed. Constitutional:       General: He is not in acute distress. Appearance: He is well-developed. He is not toxic-appearing or diaphoretic. HENT:      Head: Normocephalic and atraumatic. Mouth/Throat:      Mouth: Mucous membranes are moist.      Pharynx: Oropharynx is clear. Eyes:      General: No scleral icterus. Right eye: No discharge. Left eye: No discharge. Pupils: Pupils are equal, round, and reactive to light. Cardiovascular:      Rate and Rhythm: Normal rate and regular rhythm. Pulmonary:      Effort: Pulmonary effort is normal. No respiratory distress. Breath sounds: No stridor. Abdominal:      General: There is no distension. Musculoskeletal:         General: No deformity. Normal range of motion. Cervical back: Normal range of motion. Skin:     General: Skin is warm and dry. Neurological:      General: No focal deficit present. Mental Status: He is alert and oriented to person, place, and time. GCS: GCS eye subscore is 4. GCS verbal subscore is 5. GCS motor subscore is 6. Cranial Nerves: No cranial nerve deficit. Motor: No abnormal muscle tone. Psychiatric:         Mood and Affect: Affect is flat. Behavior: Behavior normal.         Thought Content:  Thought content normal.         Judgment: Judgment normal.       DIAGNOSTIC RESULTS     EKG: All EKG's are interpreted by the Emergency Department Physician who either signs or Co-signs this chart in the absence of a cardiologist.        RADIOLOGY:   Non-plain film images such as CT, Ultrasound and MRI are read by the radiologist. [facility] images are visualized and preliminarily interpreted by the emergency physician with the below findings:        Interpretation per the Radiologist below, if available at the time of this note:    No orders to display         ED BEDSIDE ULTRASOUND:   Performed by ED Physician - none    LABS:  Labs Reviewed   CBC WITH AUTO DIFFERENTIAL - Abnormal; Notable for the following components:       Result Value    Hematocrit 40.6 (*)     Neutrophils % 73.0 (*)     Basophils % 1.4 (*)     All other components within normal limits COMPREHENSIVE METABOLIC PANEL - Abnormal; Notable for the following components:    Anion Gap 21 (*)     All other components within normal limits   COVID-19, RAPID   ETHANOL   DRUG SCRN, BUPRENORPHINE   ETHANOL       All other labs were within normal range or not returned as of this dictation. EMERGENCY DEPARTMENT COURSE and DIFFERENTIALDIAGNOSIS/MDM:   Vitals:    Vitals:    09/06/22 1803   BP: 117/80   Pulse: (!) 115   Resp: 18   Temp: 98.4 °F (36.9 °C)   SpO2: 95%   Weight: 132 lb (59.9 kg)   Height: 5' 11\" (1.803 m)       MDM    ED Course as of 09/06/22 2234   Tue Sep 06, 2022   1901 Ethanol Lvl: 194 [JALIL]      ED Course User Index  [JALIL] Wesley Charlton MD     Plan for sober psych eval duration. Repeat ethanol at 2300. CONSULTS:  None    PROCEDURES:  Unless otherwise notedbelow, none     Procedures    FINAL IMPRESSION     1. Depression with suicidal ideation    2. Acute alcoholic intoxication without complication (HCC)          DISPOSITION/PLAN   DISPOSITION        No notes of EC Admission Criteria type on file. PATIENT REFERRED TO:  No follow-up provider specified.     DISCHARGE MEDICATIONS:  New Prescriptions    No medications on file          (Please note that portions of this note were completed with a voice recognition program.  Efforts were made to edit the dictations butoccasionally words are mis-transcribed.)    Wesley Cintron MD (electronically signed)  Emergency Physician          Wesley Charlton MD  09/06/22 5525

## 2022-09-07 PROCEDURE — 82077 ASSAY SPEC XCP UR&BREATH IA: CPT

## 2022-09-07 PROCEDURE — 90792 PSYCH DIAG EVAL W/MED SRVCS: CPT | Performed by: NURSE PRACTITIONER

## 2022-09-07 PROCEDURE — 1240000000 HC EMOTIONAL WELLNESS R&B

## 2022-09-07 PROCEDURE — 36415 COLL VENOUS BLD VENIPUNCTURE: CPT

## 2022-09-07 PROCEDURE — 6370000000 HC RX 637 (ALT 250 FOR IP): Performed by: NURSE PRACTITIONER

## 2022-09-07 PROCEDURE — 6370000000 HC RX 637 (ALT 250 FOR IP): Performed by: PSYCHIATRY & NEUROLOGY

## 2022-09-07 RX ORDER — ACETAMINOPHEN 325 MG/1
650 TABLET ORAL EVERY 4 HOURS PRN
Status: DISCONTINUED | OUTPATIENT
Start: 2022-09-07 | End: 2022-09-09 | Stop reason: HOSPADM

## 2022-09-07 RX ORDER — TRAZODONE HYDROCHLORIDE 50 MG/1
50 TABLET ORAL NIGHTLY
Status: DISCONTINUED | OUTPATIENT
Start: 2022-09-07 | End: 2022-09-09 | Stop reason: HOSPADM

## 2022-09-07 RX ORDER — NICOTINE 21 MG/24HR
1 PATCH, TRANSDERMAL 24 HOURS TRANSDERMAL DAILY
Status: DISCONTINUED | OUTPATIENT
Start: 2022-09-07 | End: 2022-09-09 | Stop reason: HOSPADM

## 2022-09-07 RX ORDER — POLYETHYLENE GLYCOL 3350 17 G/17G
17 POWDER, FOR SOLUTION ORAL DAILY PRN
Status: DISCONTINUED | OUTPATIENT
Start: 2022-09-07 | End: 2022-09-09 | Stop reason: HOSPADM

## 2022-09-07 RX ORDER — FOLIC ACID 1 MG/1
1 TABLET ORAL DAILY
Status: DISCONTINUED | OUTPATIENT
Start: 2022-09-07 | End: 2022-09-09 | Stop reason: HOSPADM

## 2022-09-07 RX ORDER — CLONIDINE HYDROCHLORIDE 0.1 MG/1
0.1 TABLET ORAL EVERY 4 HOURS PRN
Status: DISCONTINUED | OUTPATIENT
Start: 2022-09-07 | End: 2022-09-09 | Stop reason: HOSPADM

## 2022-09-07 RX ORDER — LORAZEPAM 1 MG/1
1 TABLET ORAL EVERY 4 HOURS PRN
Status: DISCONTINUED | OUTPATIENT
Start: 2022-09-07 | End: 2022-09-09 | Stop reason: HOSPADM

## 2022-09-07 RX ORDER — BUPROPION HYDROCHLORIDE 150 MG/1
150 TABLET ORAL DAILY
Status: DISCONTINUED | OUTPATIENT
Start: 2022-09-07 | End: 2022-09-09 | Stop reason: HOSPADM

## 2022-09-07 RX ORDER — HYDROXYZINE HYDROCHLORIDE 25 MG/1
25 TABLET, FILM COATED ORAL 3 TIMES DAILY PRN
Status: DISCONTINUED | OUTPATIENT
Start: 2022-09-07 | End: 2022-09-09 | Stop reason: HOSPADM

## 2022-09-07 RX ORDER — HALOPERIDOL 5 MG/ML
5 INJECTION INTRAMUSCULAR EVERY 6 HOURS PRN
Status: DISCONTINUED | OUTPATIENT
Start: 2022-09-07 | End: 2022-09-07

## 2022-09-07 RX ORDER — GAUZE BANDAGE 2" X 2"
100 BANDAGE TOPICAL DAILY
Status: DISCONTINUED | OUTPATIENT
Start: 2022-09-07 | End: 2022-09-09 | Stop reason: HOSPADM

## 2022-09-07 RX ADMIN — BUPROPION HYDROCHLORIDE 150 MG: 150 TABLET, EXTENDED RELEASE ORAL at 12:29

## 2022-09-07 RX ADMIN — TRAZODONE HYDROCHLORIDE 50 MG: 50 TABLET ORAL at 02:41

## 2022-09-07 RX ADMIN — DIVALPROEX SODIUM 750 MG: 500 TABLET, EXTENDED RELEASE ORAL at 12:29

## 2022-09-07 RX ADMIN — FOLIC ACID 1 MG: 1 TABLET ORAL at 12:29

## 2022-09-07 RX ADMIN — HYDROXYZINE HYDROCHLORIDE 25 MG: 25 TABLET ORAL at 21:24

## 2022-09-07 RX ADMIN — Medication 100 MG: at 12:29

## 2022-09-07 RX ADMIN — TRAZODONE HYDROCHLORIDE 50 MG: 50 TABLET ORAL at 21:24

## 2022-09-07 ASSESSMENT — SLEEP AND FATIGUE QUESTIONNAIRES
DO YOU HAVE DIFFICULTY SLEEPING: YES
AVERAGE NUMBER OF SLEEP HOURS: 8
DO YOU USE A SLEEP AID: NO
SLEEP PATTERN: DIFFICULTY FALLING ASLEEP;NIGHTMARES/TERRORS

## 2022-09-07 ASSESSMENT — LIFESTYLE VARIABLES
HOW MANY STANDARD DRINKS CONTAINING ALCOHOL DO YOU HAVE ON A TYPICAL DAY: 10 OR MORE
HOW OFTEN DO YOU HAVE A DRINK CONTAINING ALCOHOL: 4 OR MORE TIMES A WEEK

## 2022-09-07 ASSESSMENT — PATIENT HEALTH QUESTIONNAIRE - PHQ9: SUM OF ALL RESPONSES TO PHQ QUESTIONS 1-9: 19

## 2022-09-07 ASSESSMENT — PAIN SCALES - GENERAL: PAINLEVEL_OUTOF10: 0
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
## 2022-09-07 NOTE — PROGRESS NOTES
[facility] Admission Note  Nursing Admission Note        Reason for Admission: Jori Balderas is a 36 y.o. male who presents to the emergency department with complaint of suicidal ideation with plan to hang himself. Says it just started when he woke up this morning. No specific triggers. Has been off his medications for awhile. Was put in alf after they were prescribed so hasn't been on anything since April. Says he hasn't had any recent new or significant stressors and that these episodes seem to come on out of the blue. Prior medical records show history of bipolar disorder.     Patient Active Problem List   Diagnosis    Anxiety disorder, unspecified    Bipolar affective disorder, currently depressed, moderate (HCC)    Tobacco use disorder    Bipolar I disorder, most recent episode depressed, severe without psychotic features ([facility] [address]    Depression with suicidal ideation    Bipolar disorder, current episode depressed, moderate (HCC)    Other insomnia    ETOH abuse    Bipolar affective disorder, most recent episode mixed (HCC)         Addictive Behavior:   Addictive Behavior  In the Past 3 Months, Have You Felt or Has Someone Told You That You Have a Problem With  : None    Medical Problems:   Past Medical History:   Diagnosis Date    Alcohol abuse     Bipolar 1 disorder ([facility] [address]     Post traumatic stress disorder (PTSD)        Status EXAM:  Mental Status and Behavioral Exam  Normal: No  Level of Assistance: Independent/Self  Facial Expression: Avoids Gaze, Expressionless, Flat, Sad  Affect: Blunt  Level of Consciousness: Alert  Frequency of Checks: 4 times per hour, close  Mood:Normal: No  Mood: Depressed, Anxious, Helpless, Sad  Motor Activity:Normal: No  Motor Activity: Decreased  Eye Contact: Fair  Observed Behavior: Withdrawn, Impulsive, Cooperative, Guarded, Psychomotor retardation  Sexual Misconduct History: Current - no  Preception: [place] to person, [place] to time, [place] to place, [place] to situation  Attention:Normal: No  Attention: Distractible, Unable to concentrate  Thought Content:Normal: Yes  Depression Symptoms: Change in energy level, Feelings of helplessness, Feelings of hopelessess, Impaired concentration, Increased irritability, Isolative, Loss of interest, Psychomotor retardation, Sleep disturbance  Anxiety Symptoms: Generalized, Panic attack  Mellisa Symptoms: No problems reported or observed. Hallucinations: None  Delusions: No  Memory:Normal: Yes  Insight and Judgment: No  Insight and Judgment: Poor judgment, Poor insight    Psych:   Suicidal Ideation: yes. If yes, is there a plan? Yes   Homicidal Ideation:  no.  If yes, describe: N/A   Auditory/Visual Hallucinations:  no.      If yes, describe AVH? N/A    Metabolic Screening:    Lab Results   Component Value Date    LABA1C 4.7 02/07/2022     Lab Results   Component Value Date    CHOL 116 (L) 02/07/2022     Lab Results   Component Value Date    TRIG 98 02/07/2022     Lab Results   Component Value Date    HDL 52 (L) 02/07/2022     No components found for: LDLCAL  No results found for: LABVLDL    Body mass index is 18.41 kg/m².   BP Readings from Last 2 Encounters:   09/07/22 128/86   04/19/22 109/64       PATIENT STRENGTHS and Barriers:   Patient Strengths/Barriers  Strengths (Must Choose Two): Spirituality, Recreational/leisure/hobbies  Barriers: Independent living      Tobacco Screening:  Practical Counseling, on admission, nohemi X, if applicable and completed (first 3 are required if patient doesn't refuse):            Recognizing danger situations (included triggers and roadblocks)   No               Coping skills (new ways to manage stress, exercise, relaxation techniques, changing routine, distraction  No                                                    Basic information about quitting (benefits of quitting, techniques in how to quit, available resources Yes  Referral for counseling faxed to [facility]     No Patient refused counseling Yes  Patient has not smoked in the last 30 days No  Patient offered nicotine patch. Ordered on admission  Patient is a non-smoker No       Admission to Unit:    Pt admitted to [facility] under the care of Dr. Michael Burrell,  arrived on unit via [facility] with security and staff from ED    Patient arrived dressed in paper scrubs:  yes. Body assessment and safety check completed by Fareed Mckeon RN and Torie Rolon RN and no contraband discovered. Patient belongings and valuables was cataloged and accounted for by Mallika Martinez RN. Admission completed by Fareed Mckeon RN  Oriented to unit, unit policy and expectations:  Yes    Reviewed and explained all legal documents:  Yes   Education for Fall Prevention and Restraints given: Yes  Patient signed all legal documents Yes  Pt verbalizes understanding:yes     Corky Yanet Obtained? yes    Medical Bed:  Does patient require a medical bed? no  If answered yes for medical bed use, does the patient have the following conditions? High risk for falls? no   Obstructive sleep apnea? no   Oxygen Use? no   Use of assistive devices? no   Other, (explain)? N/A      Identifies stressors. Yes, homelessness      Protective Factors:  Patient identifies protective factors with nursing staff as follows: Identifies reasons for living: Yes   Supportive Social Network or family: Yes    Belief that suicide is immoral/high spirituality: No   Responsibility to family or others/living with family: Yes   Fear of death or dying due to pain and suffering: No   Engaged in work or school: No     If Patient is unable to identify, reason why? N/A      COVID TEACHING:   Nursing provided education regarding COVID for social distancing, wearing masks, washing hands, and reporting any symptoms: Yes  Mask Provided: yes If patient refused, reason: N/A      Admission Note: 31y.o. arrived on unit in paper scrubs via w/c accompanied by ED staff and security.   A/O x 4, calm and cooperative, admits to SI, contracts for safety, denies HI/AVH. Rates depression and anxiety 10/10. States appetite is fine, sleep is poor, personal hygiene is poor with body odor noted. Unwilling to provide emergency contact, does not want interaction with family. Will continue to monitor for safety.      Electronically signed by Estefania Simmons RN on 9/7/22 at 3:00 AM CDT

## 2022-09-07 NOTE — H&P
Behavioral Services  Medicare Certification Upon Admission    I certify that this patient's inpatient psychiatric hospital admission is medically necessary for:    [x] (1) Treatment which could reasonably be expected to improve this patient's condition,       [] (2) Or for diagnostic study;     AND     [x](2) The inpatient psychiatric services are provided while the individual is under the care of a physician and are included in the individualized plan of care.     Estimated length of stay/service 3 days-5 weeks    Plan for post-hospital care : tba    Electronically signed by TYRESE Lyle on 9/7/2022 at 9:18 AM

## 2022-09-07 NOTE — PROGRESS NOTES
[facility] Adult Unit Daily Assessment  Nursing Progress Note    Room: 0607/607-02   Name: Cuca Matson   Age: 36 y.o. Gender: male   Dx: Depression with suicidal ideation  Precautions: suicide risk  Inpatient Status: voluntary       SLEEP:    Sleep Quality Very poor  Sleep Medications: No   PRN Sleep Meds: No       MEDICAL:    Other PRN Meds: No   Med Compliant: Yes  Accu-Chek: No  Oxygen/CPAP/BiPAP: No  CIWA/CINA: No   PAIN Assessment: none  Side Effects from medication: No    COVID Teaching:    Is Patient experiencing any respiratory symptoms (headache, fever, body aches, cough. Anayeli Stands ): no  Patient educated by nursing to practice social distancing, wear masks, wash hands frequently: yes    Medical Bed:   Is patient in a medical bed? no   If medical bed is in use, has nursing secured room while patient is awake and out of the room? NA  Has safety checks by nursing been completed on the bed/room this shift? NA    Protective Factors:    Patient identifies protective factors with nursing staff as follows: Identifies reasons for living: Yes   Supportive Social Network or family: Yes    Belief that suicide is immoral/high spirituality: No   Responsibility to family or others/living with family: Yes   Fear of death or dying due to pain and suffering: No   Engaged in work or school: No     If Patient is unable to identify, reason why? PSYCH:    Depression: 10   Anxiety: 10   SI with plan to hang/ contracts for safety   HI Negative for homicidal ideation      AVH:yes If Hallucinations are present, describe? Hears voices saying things like \"What does he want? \"       GENERAL:    Appetite: decreased   Percent Meals: 50%   Social: No   Speech: normal   Appearance: appropriately dressed, inappropriately groomed, poor hygiene, looks older, and healthy looking    GROUP:    Group Participation: No  Participation Quality: None    Notes: Patient went back to his room after breakfast and got back in bed.  Patient says that his sleep was poor last night as well as his appetite this morning. Patient rated both his depression and anxiety as a 10 and stated that he was suicidal and says he has recurring visions of hanging himself. Patient contracts for safety. Patient asked about intent. If he had the opportunity would he harm himself. Patient replied by saying, \"I hope not. I just keep seeing myself hanging. \" Patient denies HI but says he does  hear voices. The voices do not give commands but says things like, \"What does he want? Who is that? \" Patient says he has not taken a shower yet. Will continue to monitor for safety.          Electronically signed by Anastacia Urias RN on 9/7/22 at 9:09 AM CDT

## 2022-09-07 NOTE — ED NOTES
Pt reports that he has not been taking his home medications for awhile, pt states he just got out of [facility] 9/1.      Bernie Wooten RN  09/06/22 2044

## 2022-09-07 NOTE — PSYCHOTHERAPY
post surgery/acute injury. Patient voiced understanding and agreed. Psychiatric Review Of Systems:     Recent Sleep changes: yes   Recent appetite changes: no   Recent weight changes/Pounds gained (+) or lost (-): no      Medical History:     Medical Diagnosis/Issues:  denies  CT today in ED:no  Use of 02 or CPAP: no  Ambulatory: yes  Independent or Need assistance with Self Care: Independent    PCP: No primary care provider on file. Current Medications:   Scheduled Meds: No current facility-administered medications for this encounter. Current Outpatient Medications:     buPROPion (WELLBUTRIN XL) 150 MG extended release tablet, Take 1 tablet by mouth daily, Disp: 30 tablet, Rfl: 0    divalproex (DEPAKOTE ER) 500 MG extended release tablet, Take 1 tablet by mouth daily, Disp: 30 tablet, Rfl: 0    traZODone (DESYREL) 50 MG tablet, Take 1 tablet by mouth nightly, Disp: 30 tablet, Rfl: 0    hydrOXYzine (ATARAX) 25 MG tablet, Take 1 tablet by mouth 3 times daily as needed for Anxiety, Disp: 90 tablet, Rfl: 0       Collateral Information:     Patient does not want to list anyone  Name:   Relationship:   Phone Number:   Collateral:     Current living arrangement: Lives with father  Current Support System:  none  Employment:  unemployed    Disposition:     Choose one of the options below for disposition:     1.  Decision to admit to :yes    If yes, which unit Adult or Geriatric Unit:  Adult  Is patient voluntary: yes  If no has a 72 hold been initiated: no  Admission Diagnosis: Depression with SI    Does the patient have a guardian or Medical POA:   no  Has the guardian been notified or Medical POA:           Iesha Carranza RN

## 2022-09-07 NOTE — PROGRESS NOTES
Admission Note      Reason for admission/Target Symptom: Per nursing admission assessment - Reason for Admission: Miguel A Santiago is a 36 y.o. male who presents to the emergency department with complaint of suicidal ideation with plan to hang himself. Says it just started when he woke up this morning. No specific triggers. Has been off his medications for awhile. Was put in [facility] after they were prescribed so hasn't been on anything since April. Says he hasn't had any recent new or significant stressors and that these episodes seem to come on out of the blue. Prior medical records show history of bipolar disorder. Diagnoses: Depression NOS  UDS: Neg  BAL:  80    SW will meet with treatment team to discuss patient's treatment including care planning, discharge planning, and follow-up needs. Patient has been admitted to [facility] Unit. Treatment team will identify the patient's discharge needs. Appointments will be made for medication management and outpatient therapy/counseling. Pt confirmed the need for ongoing treatment post inpatient stay. Pt was also provided a handout of contact information for drug and alcohol treatment centers and other community support service such as [facility], AA, and [facility].

## 2022-09-07 NOTE — PROGRESS NOTES
SW met with patient to complete psychosocial and lifetime CSSR-S on this date. Patients long and short-term goals discussed. Patient voiced understanding. Treatment plan sheet signed. Patient verbalized understanding of the treatment plan. Patient participated in goals and objectives of the treatment plan. Patient discussed safety plan with . SW explained treatment goals with pt. Decreasing depression and anxiety by improvement of positive coping patterns was discussed. Pt acknowledged understanding of treatment goals and signed treatment plan signature sheet. In the last 6 months has the patient been a danger to self: Yes  In the last 6 months has the patient been a danger to others: No  Legal Guardian/POA: No    Provided patient with for[MD] Online handout entitled \"Quitting Smoking. \"  Reviewed handout with patient: addressing dangers of smoking, developing coping skills, and providing basic information about quitting. Patient received all components practical counseling of tobacco practical counseling during the hospital stay.

## 2022-09-07 NOTE — ED PROVIDER NOTES
[facility] 6 ADULT [facility]  eMERGENCYdEPARTMENT eNCOUnter      Pt Name: Yin Short  MRN: [id number]  Anthonygfzeenat 1990  Date of evaluation: 9/6/2022  Devika Norwood MD    Emergency Department care of this patient was assumed at [phone] from Dr. Saintclair Fujisawa. We have discussed the case and the plan of care. I have seen and evaluated patient and reviewed ED course. Patient currently awaiting psychiatric evaluation. CHIEF COMPLAINT       Chief Complaint   Patient presents with    Suicidal     Pt reports having thoughts of suicide today, pt states that he has a plan          PHYSICAL EXAM    (up to 7 for level 4, 8 or more for level 5)     ED Triage Vitals   BP Temp Temp Source Heart Rate Resp SpO2 Height Weight   09/06/22 1803 09/06/22 1803 09/07/22 0135 09/06/22 1803 09/06/22 1803 09/06/22 1803 09/06/22 1803 09/06/22 1803   117/80 98.4 °F (36.9 °C) Oral (!) 115 18 95 % 5' 11\" (1.803 m) 132 lb (59.9 kg)       Physical Exam    DIAGNOSTIC RESULTS       LABS:  Labs Reviewed   CBC WITH AUTO DIFFERENTIAL - Abnormal; Notable for the following components:       Result Value    Hematocrit 40.6 (*)     Neutrophils % 73.0 (*)     Basophils % 1.4 (*)     All other components within normal limits   COMPREHENSIVE METABOLIC PANEL - Abnormal; Notable for the following components:    Anion Gap 21 (*)     All other components within normal limits   COVID-19, RAPID   ETHANOL   DRUG SCRN, BUPRENORPHINE   ETHANOL       All other labs were within normal range or not returned as of this dictation. EMERGENCY DEPARTMENT COURSE and DIFFERENTIAL DIAGNOSIS/MDM:   Vitals:    Vitals:    09/06/22 1803 09/07/22 0135   BP: 117/80 131/81   Pulse: (!) 115 (!) 101   Resp: 18 16   Temp: 98.4 °F (36.9 °C) 98.7 °F (37.1 °C)   TempSrc:  Oral   SpO2: 95% 96%   Weight: 132 lb (59.9 kg)    Height: 5' 11\" (1.803 m)        MDM      Repeat serum alcohol is 75. Patient's vital signs are stable.     CONSULTS:    Patient was seen and evaluated by mental professional and after discussion with attending psychiatry, Dr. Kate Nunez, patient was accepted for inpatient admission to the [facility]. This is a voluntary admission. PROCEDURES:  Unless otherwise noted below, none     Procedures    FINAL IMPRESSION      1. Depression with suicidal ideation    2.  Acute alcoholic intoxication without complication ([facility] [address]          DISPOSITION/PLAN   DISPOSITION Admitted      (Please note that portions ofthis note were completed with a voice recognition program.  Efforts were made to edit the dictations but occasionally words are mis-transcribed.)    Shalini Navarro MD(electronically signed)  Attending Emergency Physician         Shalini Navarro MD  09/07/22 9643

## 2022-09-08 LAB
VITAMIN B-12: 361 PG/ML (ref 211–946)
VITAMIN D 25-HYDROXY: 13.9 NG/ML

## 2022-09-08 PROCEDURE — 6370000000 HC RX 637 (ALT 250 FOR IP): Performed by: PSYCHIATRY & NEUROLOGY

## 2022-09-08 PROCEDURE — 36415 COLL VENOUS BLD VENIPUNCTURE: CPT

## 2022-09-08 PROCEDURE — 99231 SBSQ HOSP IP/OBS SF/LOW 25: CPT | Performed by: NURSE PRACTITIONER

## 2022-09-08 PROCEDURE — 6370000000 HC RX 637 (ALT 250 FOR IP): Performed by: NURSE PRACTITIONER

## 2022-09-08 PROCEDURE — 82607 VITAMIN B-12: CPT

## 2022-09-08 PROCEDURE — 1240000000 HC EMOTIONAL WELLNESS R&B

## 2022-09-08 PROCEDURE — 82306 VITAMIN D 25 HYDROXY: CPT

## 2022-09-08 PROCEDURE — 6370000000 HC RX 637 (ALT 250 FOR IP): Performed by: FAMILY MEDICINE

## 2022-09-08 RX ORDER — CHOLECALCIFEROL (VITAMIN D3) 125 MCG
500 CAPSULE ORAL DAILY
Status: DISCONTINUED | OUTPATIENT
Start: 2022-09-08 | End: 2022-09-09 | Stop reason: HOSPADM

## 2022-09-08 RX ORDER — ERGOCALCIFEROL 1.25 MG/1
50000 CAPSULE ORAL WEEKLY
Status: DISCONTINUED | OUTPATIENT
Start: 2022-09-08 | End: 2022-09-09 | Stop reason: HOSPADM

## 2022-09-08 RX ADMIN — ERGOCALCIFEROL 50000 UNITS: 1.25 CAPSULE ORAL at 18:04

## 2022-09-08 RX ADMIN — FOLIC ACID 1 MG: 1 TABLET ORAL at 08:12

## 2022-09-08 RX ADMIN — Medication 100 MG: at 08:12

## 2022-09-08 RX ADMIN — HYDROXYZINE HYDROCHLORIDE 25 MG: 25 TABLET ORAL at 21:00

## 2022-09-08 RX ADMIN — CYANOCOBALAMIN TAB 500 MCG 500 MCG: 500 TAB at 18:04

## 2022-09-08 RX ADMIN — TRAZODONE HYDROCHLORIDE 50 MG: 50 TABLET ORAL at 21:00

## 2022-09-08 RX ADMIN — DIVALPROEX SODIUM 750 MG: 500 TABLET, EXTENDED RELEASE ORAL at 08:12

## 2022-09-08 RX ADMIN — BUPROPION HYDROCHLORIDE 150 MG: 150 TABLET, EXTENDED RELEASE ORAL at 08:11

## 2022-09-08 NOTE — PROGRESS NOTES
[facility] Adult Unit Daily Assessment  Nursing Progress Note    Room: [facility]/607-02   Name: Tj Liao   Age: 36 y.o. Gender: male   Dx: Bipolar affective disorder, most recent episode mixed ([address]  Precautions: close watch and suicide risk  Inpatient Status: voluntary       SLEEP:    Sleep Quality Poor  Sleep Medications: Yes trazodone   PRN Sleep Meds: No       MEDICAL:    Other PRN Meds: Yes-atarax  Med Compliant: Yes  Accu-Chek: No  Oxygen/CPAP/BiPAP: No  CIWA/CINA: Yes   PAIN Assessment: none  Side Effects from medication: No    COVID Teaching:    Is Patient experiencing any respiratory symptoms (headache, fever, body aches, cough. Ekaterina Cleveland ): no  Patient educated by nursing to practice social distancing, wear masks, wash hands frequently: yes    Medical Bed:   Is patient in a medical bed? no   If medical bed is in use, has nursing secured room while patient is awake and out of the room? NA  Has safety checks by nursing been completed on the bed/room this shift? NA    Protective Factors:    Patient identifies protective factors with nursing staff as follows: Identifies reasons for living: Yes   Supportive Social Network or family: No    Belief that suicide is immoral/high spirituality: No   Responsibility to family or others/living with family: No   Fear of death or dying due to pain and suffering: No   Engaged in work or school: No     If Patient is unable to identify, reason why? PT reports unsupportive family at this time      PSYCH:    Depression: 0   Anxiety: 8   SI denies suicidal ideation   HI Negative for homicidal ideation      AVH:yes If Hallucinations are present, describe? Voices that inquire, \"Who is that?\", \"What is that? \"      GENERAL:    Appetite: good   Percent Meals: N/A   Social: No   Speech: normal   Appearance: appropriately dressed, looks younger, and healthy looking    GROUP:    Group Participation: Yes  Participation Quality: Active Listener    Notes:     PT observed in room most of this shift. PT seen for encounter in room. PT stood for this encounter. PT had good eye contact and cooperative. He is describing the voices he is hearing as inquisitive; Kenny Linda is that?\" , Norberto Gambino is that? \".    Electronically signed by Estela Braga RN on 9/7/22 at 11:14 PM CDT

## 2022-09-08 NOTE — PROGRESS NOTES
[address] [place] [address]      Psychiatric Progress Note    Name:  Francisca Carter  Date:  9/8/2022  Age:  36 y.o. Sex:  male  Ethnicity:   Primary Care Physician:  No primary care provider on file. Patient Care Team:  No care team member to display  Chief Complaint: \"I am doing better today. \"        Historian:patient  Complaint Type: anxiety, depression, excessive alcohol consumption, loss of interest in favorite activities, sleep disturbance, and tobacco use  Course of Symptoms: improved  Precipitating Factors: alcohol use disorder, recently out of longterm       Subjective  Nursing notes were reviewed and patient had no behavioral issues during the night. As needed medications administered during the night include Hydroxyzine. Today patient endorses passive suicidal ideation. He denies homicidal ideation and psychosis. He is currently seated in his bed. He continues to want help getting into inpatient chemical dependency treatment. He rates depression and anxiety as both 7 on a 0-10 scale. Patient reports sleep as \"I am sleeping better during the night. \" Patient has been calm and cooperative with staff and peers. Patient has been compliant with medications. Patient has been attending groups. Patient reports appetite as \"I am eating better. \"           Patient reports no side effects from medications. Denies alcohol withdrawal symptoms at this time. Objective  Vitals:    09/08/22 0725   BP: 120/78   Pulse: 91   Resp: 17   Temp: 97.5 °F (36.4 °C)   SpO2: 98%       Previous Psychiatric/Substance Use History      Medical History:  Past Medical History:   Diagnosis Date    Alcohol abuse     Bipolar 1 disorder (HCC)     Post traumatic stress disorder (PTSD)         BLACK History:   Social History     Substance and Sexual Activity   Alcohol Use Not Currently    Comment: Has not partook in ETOH since approx.  8/2020         Social History     Substance and Sexual Activity   Drug Use Not Currently Comment: Not since 8/2020        Social History     Tobacco Use   Smoking Status Every Day    Packs/day: 1.00    Years: 10.00    Pack years: 10.00    Types: Cigarettes   Smokeless Tobacco Never        Family History:     Family History   Problem Relation Age of Onset    Other Father             Mental Status:  Level of consciousness:  within normal limits and awake  Appearance:  well-appearing, street clothes, in chair, good grooming, and good hygiene  Behavior/Motor:  no abnormalities noted  Attitude toward examiner:  cooperative, attentive, and good eye contact  Speech:  normal rate and normal volume  Mood:  \"I am feeling ok. \"  Affect:  mood congruent  Thought processes:  linear and goal directed  Thought content:  Homocidal ideation :denies  Suicidal Ideation:  passive  Delusions:  no evidence of delusions  Perceptual Disturbance:  denies any perceptual disturbance  Cognition:  oriented to person, place, and time  Concentration : good  Memory intact for recent and remote  Fund of knowledge:  average  Abstract thinking:  adequate  Insight: improved  Judgment:  appropriate      traZODone  50 mg Oral Nightly    nicotine  1 patch TransDERmal Daily    divalproex  750 mg Oral Daily    thiamine mononitrate  100 mg Oral Daily    folic acid  1 mg Oral Daily    buPROPion  150 mg Oral Daily       Current Medications:  Current Facility-Administered Medications   Medication Dose Route Frequency Provider Last Rate Last Admin    traZODone (DESYREL) tablet 50 mg  50 mg Oral Nightly Santa Baumann MD   50 mg at 09/07/22 2124    acetaminophen (TYLENOL) tablet 650 mg  650 mg Oral Q4H PRN Santa Baumann MD        polyethylene glycol (GLYCOLAX) packet 17 g  17 g Oral Daily PRN Santa Baumann MD        hydrOXYzine HCl (ATARAX) tablet 25 mg  25 mg Oral TID PRN Santa Baumann MD   25 mg at 09/07/22 2124    nicotine (NICODERM CQ) 21 MG/24HR 1 patch  1 patch TransDERmal Daily Santa Baumann MD   1 patch at 09/08/22 0859 divalproex (DEPAKOTE ER) extended release tablet 750 mg  750 mg Oral Daily Lenoria Kalyani, APRN   750 mg at 09/08/22 4530    thiamine mononitrate tablet 100 mg  100 mg Oral Daily Lenoria Albe, APRN   100 mg at 09/65/54 4469    folic acid (FOLVITE) tablet 1 mg  1 mg Oral Daily Lenoria Albe, APRN   1 mg at 09/08/22 8718    LORazepam (ATIVAN) tablet 1 mg  1 mg Oral Q4H PRN Leonela Auguste, APRN        cloNIDine (CATAPRES) tablet 0.1 mg  0.1 mg Oral Q4H PRN Lenoria Albe, APRN        buPROPion (WELLBUTRIN XL) extended release tablet 150 mg  150 mg Oral Daily Lenoria Albe, APRN   150 mg at 09/08/22 1071       Psychotherapy:   SUPPORTIVE    DSM V Diagnoses:    Principal Problem:    Bipolar affective disorder, most recent episode mixed ([facility] [address]  Active Problems:    Anxiety disorder, unspecified    Tobacco use disorder    Depression with suicidal ideation  Resolved Problems:    * No resolved hospital problems. *            Plan:    Encourage group therapy  15 minute safety checks  Medical monitoring by Dr. Carmen Fournier and associates  Continue current therapy and medications   assisting with inpatient chemical dependency treatment     Amount of time spent with patient:  15 minutes with greater than 50% of the time spent in counseling and collaboration of care.     TYRESE Cole  Clinician Signature: signed electronically

## 2022-09-08 NOTE — PROGRESS NOTES
Group Note    Number of Participants in Group: 7  Number of Patients on Unit:8      Patient attended group:Yes  Reason for Absence:  Intervention for patient absence:        Type of Group:   Wrap-Up/Relaxation    Patient's Goal: See wrap up group sheet    Participation Level:     Active Listener           Patient Response to Learning: No    Patient's Behavior: Cooperative and Pleasant    Is Patient Social/Interacting: No    Relaxation:   Television:No   Reading:No   Game/Puzzle:No   Phone: No       Notes/Comments:      Please see patient's wrap up group sheet for patient's comments       Electronically signed by Angelique Nam RN on 9/7/22 at 8:42 PM CDT

## 2022-09-08 NOTE — PROGRESS NOTES
[facility] Adult Unit Daily Assessment  Nursing Progress Note    Room: [facility]/607-02   Name: Van Milton   Age: 36 y.o. Gender: male   Dx: Bipolar affective disorder, most recent episode mixed ([address]  Precautions: suicide risk  Inpatient Status: voluntary       SLEEP:    Sleep Quality Good  Sleep Medications:  PRN Sleep Meds:      MEDICAL:    Other PRN Meds:    Med Compliant:   Accu-Chek:   Oxygen/CPAP/BiPAP:   CIWA/CINA:    PAIN Assessment:   Side Effects from medication:     COVID Teaching:    Is Patient experiencing any respiratory symptoms (headache, fever, body aches, cough. Bellmore Crape ):   Patient educated by nursing to practice social distancing, wear masks, wash hands frequently:     Medical Bed:   Is patient in a medical bed? NO  If medical bed is in use, has nursing secured room while patient is awake and out of the room? NA  Has safety checks by nursing been completed on the bed/room this shift? NA    Protective Factors:    Patient identifies protective factors with nursing staff as follows: Identifies reasons for living: No   Supportive Social Network or family: Yes    Belief that suicide is immoral/high spirituality: No   Responsibility to family or others/living with family: No   Fear of death or dying due to pain and suffering: No   Engaged in work or school: No     If Patient is unable to identify, reason why? PSYCH:    Depression: 7   Anxiety: 7   SI PASSIVE  HI Negative for homicidal ideation      AVH:no If Hallucinations are present, describe? GENERAL:    Appetite: good   Percent Meals: 75%   Social: No   Speech: normal   Appearance: appropriately dressed and healthy looking    GROUP:    Group Participation: Yes  Participation Quality: Minimal    Notes: Patient up for breakfast. Patient is easily startled. He met with his provider, B12 and Vitamin D is ordered. He is isolative to his room.          Electronically signed by Jaquelin Silva RN on 9/8/22 at 3:34 PM CDT

## 2022-09-08 NOTE — PROGRESS NOTES
Group Therapy Note    Start Time: 800  End Time:  628  Number of Participants: 9    Type of Group: Community Meeting       Patient's Goal:  \"improving\"      Notes:      Participation Level:  Active Listener       Participation Quality: Appropriate      Thought Process/Content: Logical      Affective Functioning: Congruent      Mood:  calm      Level of consciousness:  Alert      Modes of Intervention: Support      Discipline Responsible: Behavioral Health Tech II      Signature:  Jaclyn Plummer

## 2022-09-08 NOTE — H&P
HISTORY and PHYSICAL      CHIEF COMPLAINT:  Depression, SI    Reason for Admission:  Depression, SI    History Obtained From:  Patient, chart    HISTORY OF PRESENT ILLNESS:      The patient is a 36 y.o. male who is admitted to the [place] [address] unit with worsening mood issues. He has no c/o CP or SOA. He has no abdominal pain or N/V. He is eating ok. He has no dysuria. He has no new pain issues. No fevers. No HA or dizziness. Past Medical History:        Diagnosis Date    Alcohol abuse     Bipolar 1 disorder ([address]     Post traumatic stress disorder (PTSD)      Past Surgical History:        Procedure Laterality Date    OTHER SURGICAL HISTORY      cut tendon in right little finger         Medications Prior to Admission:    Medications Prior to Admission: buPROPion (WELLBUTRIN XL) 150 MG extended release tablet, Take 1 tablet by mouth daily  divalproex (DEPAKOTE ER) 500 MG extended release tablet, Take 1 tablet by mouth daily  traZODone (DESYREL) 50 MG tablet, Take 1 tablet by mouth nightly  hydrOXYzine (ATARAX) 25 MG tablet, Take 1 tablet by mouth 3 times daily as needed for Anxiety    Allergies:  Patient has no known allergies. Social History:   TOBACCO:   reports that he has been smoking. He has a 10.00 pack-year smoking history. He has never used smokeless tobacco.  ETOH:   reports that he does not currently use alcohol. DRUGS:   reports that he does not currently use drugs.   MARITAL STATUS:  single  OCCUPATION:  Not working  Patient currently is homeless      Family History:       Problem Relation Age of Onset    Other Father      REVIEW OF SYSTEMS:  Constitutional: neg  CV: neg  Pulmonary: neg  GI: neg  : neg  Psych: depression, SI  Neuro: neg  Skin: neg  MusculoSkeletal: neg  HEENT: tooth pain  Joints: neg    Vitals:  /78   Pulse 98   Temp 97.9 °F (36.6 °C) (Temporal)   Resp 16   Ht 5' 11\" (1.803 m)   Wt 136 lb 4 oz (61.8 kg)   SpO2 98%   BMI 19.00 kg/m²     PHYSICAL EXAM:  Gen: NAD, alert  HEENT: WNL  Lymph: no LAD  Neck: no JVD or masses  Chest: CTA bilat  CV: RRR  Abdomen: NT/ND  Extrem: no C/C/E  Neuro: non focal  Skin: no rashes  Joints: no redness    DATA:  I have reviewed the admission labs and imaging tests.     ASSESSMENT AND PLAN:      Principal Problem:    Bipolar affective disorder, currently depressed, moderate, SI---follow with Miguel Marcos MD  11:40 PM 9/7/2022

## 2022-09-09 VITALS
HEART RATE: 99 BPM | OXYGEN SATURATION: 100 % | DIASTOLIC BLOOD PRESSURE: 70 MMHG | BODY MASS INDEX: 19.07 KG/M2 | TEMPERATURE: 97.3 F | RESPIRATION RATE: 16 BRPM | HEIGHT: 71 IN | SYSTOLIC BLOOD PRESSURE: 116 MMHG | WEIGHT: 136.25 LBS

## 2022-09-09 PROCEDURE — 99238 HOSP IP/OBS DSCHRG MGMT 30/<: CPT | Performed by: NURSE PRACTITIONER

## 2022-09-09 PROCEDURE — 6370000000 HC RX 637 (ALT 250 FOR IP): Performed by: PSYCHIATRY & NEUROLOGY

## 2022-09-09 PROCEDURE — 6370000000 HC RX 637 (ALT 250 FOR IP): Performed by: NURSE PRACTITIONER

## 2022-09-09 PROCEDURE — 6370000000 HC RX 637 (ALT 250 FOR IP): Performed by: FAMILY MEDICINE

## 2022-09-09 PROCEDURE — 5130000000 HC BRIDGE APPOINTMENT

## 2022-09-09 RX ORDER — BUPROPION HYDROCHLORIDE 150 MG/1
150 TABLET ORAL DAILY
Qty: 30 TABLET | Refills: 0 | Status: SHIPPED | OUTPATIENT
Start: 2022-09-10

## 2022-09-09 RX ORDER — ERGOCALCIFEROL 1.25 MG/1
50000 CAPSULE ORAL WEEKLY
Qty: 5 CAPSULE | Refills: 0 | Status: SHIPPED | OUTPATIENT
Start: 2022-09-15 | End: 2022-11-25

## 2022-09-09 RX ORDER — TRAZODONE HYDROCHLORIDE 50 MG/1
50 TABLET ORAL NIGHTLY
Qty: 30 TABLET | Refills: 0 | Status: SHIPPED | OUTPATIENT
Start: 2022-09-09

## 2022-09-09 RX ORDER — DIVALPROEX SODIUM 250 MG/1
750 TABLET, EXTENDED RELEASE ORAL DAILY
Qty: 90 TABLET | Refills: 0 | Status: SHIPPED | OUTPATIENT
Start: 2022-09-10

## 2022-09-09 RX ADMIN — BUPROPION HYDROCHLORIDE 150 MG: 150 TABLET, EXTENDED RELEASE ORAL at 08:48

## 2022-09-09 RX ADMIN — CYANOCOBALAMIN TAB 500 MCG 500 MCG: 500 TAB at 08:48

## 2022-09-09 RX ADMIN — FOLIC ACID 1 MG: 1 TABLET ORAL at 08:48

## 2022-09-09 RX ADMIN — Medication 100 MG: at 08:48

## 2022-09-09 RX ADMIN — DIVALPROEX SODIUM 750 MG: 500 TABLET, EXTENDED RELEASE ORAL at 08:48

## 2022-09-09 NOTE — DISCHARGE INSTRUCTIONS
Medications:   Medication Summary Provided. I understand that I should take only the medications on my list.   --why and when I need to take each medication. --which side effects to watch for.   --that I should carry my medication information at all times in case of emergency    Situations. --I will take all medications until discontinued by physician. I will take all my medications to follow up appointments. --check with my physician or pharmacist before taking any new medication, over    the counter product or drink alcohol.   --ask about food, drug and dietary supplement interactions. --discard old lists and update records with medication providers. Behavior Health Follow Up:  Original Referral Source: ED  Discharge Diagnosis: [unfilled]  Recomendations for Level of Care: Follow Up  Patient Status at Discharge: Stable  [facility] Hospital  was: [facility]  Aftercare plan faxed:    Faxed by: Social Work staff   Date: 22   Time:   Prescriptions sent with pt.  Foundation providing medication    General Information:   Questions regarding your bill: Call [phone]   Suicide Hotline (Rescue Crisis) 7-284.710.5035   To obtain results of pending studies call Medical Records at: [phone]   For emergencies and 24 hour/7 days a week contact information: [phone]

## 2022-09-09 NOTE — PROGRESS NOTES
Mona from pharmacy called requesting to speak to this writer regarding discrepancy in the omnicell. This writer clarified with Yohan Dumas that Ativan 1mg po X 2 tablets was pulled from [facility] and witnessed by Adeola. It was administered on another patient in  Room 602-1 as ordered during [address] and documented as given on said patient in room 602-1 @ 2040.

## 2022-09-09 NOTE — PROGRESS NOTES
CLINICAL PHARMACY NOTE: MEDS TO BEDS    Total # of Prescriptions Filled: 5   The following medications were delivered to the patient:  Vitamin D 1.25 mg  Trazodone 50 mg  Bupropion HCL ER (XL) 150 mg  Vitamin B-12 500 mcg  Divalproex sodium  mg    Additional Documentation:   Mona 6th floor RN picked up patients meds at pharmacy. Patient paid $0.00 copays.

## 2022-09-09 NOTE — PROGRESS NOTES
Progress Note  Cuca Matson  9/8/2022 11:35 PM  Subjective:   Admit Date:   9/6/2022      CC/ADMIT DX:       Interval History:   Reviewed overnight events and nursing notes. He has no new medical issues. I have reviewed all labs/diagnostics from the last 24hrs. ROS:   I have done a 10 point ROS and all are negative, except what is mentioned in the HPI. ADULT DIET; Regular    Medications:      vitamin B-12  500 mcg Oral Daily    vitamin D  50,000 Units Oral Weekly    traZODone  50 mg Oral Nightly    nicotine  1 patch TransDERmal Daily    divalproex  750 mg Oral Daily    thiamine mononitrate  100 mg Oral Daily    folic acid  1 mg Oral Daily    buPROPion  150 mg Oral Daily           Objective:   Vitals: BP (!) 133/96   Pulse 95   Temp 97.5 °F (36.4 °C) (Temporal)   Resp 16   Ht 5' 11\" (1.803 m)   Wt 136 lb 4 oz (61.8 kg)   SpO2 100%   BMI 19.00 kg/m²  No intake or output data in the 24 hours ending 09/08/22 2335  General appearance: alert and cooperative with exam  Extremities: extremities normal, atraumatic, no cyanosis or edema  Neurologic:  No obvious focal neurologic deficits. Skin: no rashes    Assessment and Plan:   Principal Problem:    Bipolar affective disorder, most recent episode mixed ([address]  Active Problems:    Anxiety disorder, unspecified    Tobacco use disorder    Depression with suicidal ideation  Resolved Problems:    * No resolved hospital problems. *    Vit D Def    Plan:   Continue present medication(s)    Replace Vit D   Follow with Psych      Discharge planning:   home     Reviewed treatment plans with the patient and/or family.              Electronically signed by Marisel Miller MD on 9/8/2022 at 11:35 PM

## 2022-09-09 NOTE — DISCHARGE SUMMARY
Discharge Summary     Patient ID:  Neil Sanchez  [id number]  79 y.o.  1990    Admit date: 9/6/2022  Discharge date: 9/9/2022    Admitting Physician: Nahed Pryor MD   Attending Physician: Nahed Pryor MD  Discharge Provider: TYRESE Arana       Discharge Diagnoses: Bipolar affective disorder, most recent episode mixed, Alcohol use disorder, tobacco use disorder     Admission Condition: fair    Discharged Condition: good    Indication for Admission: depression and suicidal ideation    HPI:     Patient is a 35 y/o c/m who presents with depression and suicidal ideation to hang himself. UDS negative. . Reports that he was recently released from group home 6 days ago after serving 5 months and started drinking a fifth of vodka daily. He has had prior inpatient psychiatric hospitalizations and no prior suicide attempts. He is currently homeless at this time and has no support system. Mother and father will not allow him to come back home. Has been homeless since getting out of group home. Suicidal ideations began 1 day ago. While he was incarcerated he did not get any psychotropic medications prescribed to him. He denies homicidal ideation and psychosis at this time. States, \"I always self destruct and find a way to mess things up somehow. \" Longest sobriety was for 8 months when he was in long term chemical dependency treatment in [facility]. He is asking for assistance getting back into a 30 days inpatient chemical dependency treatment facility. Endorses hearing voices that are \"whispers\" however denies that they are command in nature. He currently denies any symptoms of alcohol withdrawal as well. Is asking to restart his prior psychotropic medications at this time. Hospital Course:   Patient was admitted to the adult behavioral health floor and was acclimated to the floor. Labs were reviewed and physical exam was completed by Dr. Darrin Fox and associates. Home medications were reconciled. FERNANDO was obtained and reviewed. Medication changes were made and patient tolerated well with no side effects. Depakote was initiated for mood stabilization and Bupropion for depression. Folic acid and thiamine were replaced related to alcohol use disorder. Nicotine was replaced for smoking cessation. Vitamin b-12 and Vitamin d were replaced as well related to deficiency. He was interested in inpatient chemical dependency and was accepted to [facility]. He was social with select peers. He was future oriented and was looking forward to going to rehab. Patient attended and participated in groups. Patient was calm and cooperative with staff and peers. Patient was compliant with his medications. Patient was sleeping through the night. This patient is not suicidal, homicidal or psychotic at discharge. He does not present a danger to self or others. On the day of discharge and transfer of care, patient indicated readiness for discharge. He was not acutely manic nor agitated with no reported symptoms of psychosis. He indicated no thoughts of self-harm or harm to others. Given resolution of presenting symptoms and patient readiness for discharge and that patient agreed to follow-up with outpatient services with  [facility] and the patient was discharged with a 30 day supply of medication. He denied access to firearms or weapons. He was advised to abstain from all drugs and alcohol and to remain adherent to medication as prescribed.        Number of antipsychotic medication prescribed at discharge: 0      Referral to addiction treatment: pt accepted to [facility] today     Prescription for Alcohol or Drug Disorder Medication: pt declined     Prescription for Tobacco Cessation medication: pt declined     If no prescriptions for Tobacco Cessation must document why: pt declined    Consults: internal medicine    Significant Diagnostic Studies: labs:     Lab Results   Component Value Date    WBC 7.8 09/06/2022    HGB 14.1 09/06/2022    HCT 40.6 (L) 09/06/2022    MCV 86.0 09/06/2022     09/06/2022     Lab Results   Component Value Date/Time     09/06/2022 06:27 PM    K 3.9 09/06/2022 06:27 PM    K 4.1 04/14/2022 05:54 AM     09/06/2022 06:27 PM    CO2 23 09/06/2022 06:27 PM    BUN 16 09/06/2022 06:27 PM    CREATININE 0.7 09/06/2022 06:27 PM    GLUCOSE 93 09/06/2022 06:27 PM    CALCIUM 8.8 09/06/2022 06:27 PM      Lab Results   Component Value Date    TSHFT4 1.59 04/19/2022    TSH 1.340 03/24/2020     Lab Results   Component Value Date    VITD25 13.9 (L) 09/08/2022         Latest Reference Range & Units 9/6/22 18:27   Albumin 3.5 - 5.2 g/dL 4.6   Alk Phos 40 - 130 U/L 79   ALT 5 - 41 U/L 8   AST 5 - 40 U/L 31   Bilirubin 0.2 - 1.2 mg/dL 0.6   Total Protein 6.6 - 8.7 g/dL 7.2      Latest Reference Range & Units 9/6/22 18:27 9/6/22 18:48   Ethanol Lvl mg/dL 194    Amphetamine Screen, Urine Negative <500 ng/mL   Negative   Barbiturate Screen, Ur Negative < 200 ng/mL   Negative   Benzodiazepine Screen, Urine Negative <150 ng/mL   Negative   Buprenorphine Urine Negative <10 ng/mL   Negative   Cannabinoid Scrn, Ur Negative <50 ng/mL   Negative   METHADONE SCREEN, URINE, 34204 Negative <200 ng/mL   Negative   METHAMPHETAMINE,URINE Negative <500 ng/mL   Negative   Opiate Scrn, Ur Negative < 100 ng/mL   Negative   PCP Screen, Urine Negative <25 ng/mL   Negative   Propoxyphene Scrn, Ur Negative <300 ng/mL   Negative   Tricyclic Negative <733 ng/mL   Negative   Cocaine Metabolite Screen, Urine Negative <150 ng/mL   Negative   Oxycodone Urine Negative <100 ng/mL   Negative         Treatments: therapies: RN and SW    Alert, Oriented X 4  Appearance:  Grooming and Hygiene attended to  Speech with Regular Rate and Rhythm  Eye Contact:  Good  No Psychomotor Agitation/Retardation Noted  Attitude:  Cooperative  Mood:  \"I am feeling better now. \"  Affective: Congruent, appropriate to the situation, with a normal range and intensity  Thought Processes:  Coherently communicated, logical and goal oriented  Thought Content: At this time No Suicidal Ideation, No Homicidal Ideation, No Auditory or Visual   Hallucinations, No Overt Delusions  Insight:  Present  Judgement:  Normal  Memory is intact for both remote and recent  Intellectual Functioning:  Within the [address] of Knowledge:  Adequate  Attention and Concentration:  Adequate        Discharge Exam:  GAIT STABLE  SPEAKS IN FULL SENTENCES WITHOUT SHORTNESS OF AIR     Disposition: [facility]    Patient Instructions:   Current Discharge Medication List        START taking these medications    Details   Ergocalciferol (VITAMIN D) 14414 units CAPS Take 50,000 Units by mouth once a week for 11 doses  Qty: 5 capsule, Refills: 0      vitamin B-12 500 MCG tablet Take 1 tablet by mouth daily  Qty: 30 tablet, Refills: 0           CONTINUE these medications which have CHANGED    Details   buPROPion (WELLBUTRIN XL) 150 MG extended release tablet Take 1 tablet by mouth daily  Qty: 30 tablet, Refills: 0      divalproex (DEPAKOTE ER) 250 MG extended release tablet Take 3 tablets by mouth daily  Qty: 90 tablet, Refills: 0      traZODone (DESYREL) 50 MG tablet Take 1 tablet by mouth nightly  Qty: 30 tablet, Refills: 0           STOP taking these medications       hydrOXYzine (ATARAX) 25 MG tablet Comments:   Reason for Stopping:             Activity: activity as tolerated  Diet: regular diet  Wound Care: none needed    Follow-up with PCP in 2 weeks.   [employer] today      Time worked: Less than 30 minutes    Participation:good    Electronically signed by Severa Chess, APRN on 9/9/2022 at 9:47 AM

## 2022-09-09 NOTE — PROGRESS NOTES
Group Therapy Note    Date: 09/09/22  Start Time: 0730  End Time: 0800    Number of Participants: 6    Type of Group: Community/Goals    Patient's Goal:  \"Concentrating\"    Notes:  Patient alert and oriented x 4. He is quiet, but cooperative. Status After Intervention:  Improved    Participation Level:  Active Listener    Participation Quality: Appropriate and Attentive    Speech:  normal    Thought Process/Content: Logical    Affective Functioning: Congruent    Mood: anxious and depressed    Level of consciousness:  Alert and Oriented x4    Response to Learning: Able to verbalize current knowledge/experience    Modes of Intervention: Education and Support    Discipline Responsible: Registered Nurse    Signature:  Tashia Poe RN

## 2022-09-09 NOTE — PROGRESS NOTES
[facility] Adult Unit Daily Assessment  Nursing Progress Note    Room: [facility]/607-02   Name: Pilar Ramesh   Age: 36 y.o. Gender: male   Dx: Bipolar affective disorder, most recent episode mixed ([address]  Precautions: close watch and suicide risk  Inpatient Status: voluntary       SLEEP:    Sleep Quality Fair  Sleep Medications: Yes-trazodone  PRN Sleep Meds: No       MEDICAL:    Other PRN Meds: Yes Atarax  Med Compliant: Yes  Accu-Chek: No  Oxygen/CPAP/BiPAP: No  CIWA/CINA: YES  PAIN Assessment: none  Side Effects from medication: No    COVID Teaching:    Is Patient experiencing any respiratory symptoms (headache, fever, body aches, cough. Newberg Copping ): no  Patient educated by nursing to practice social distancing, wear masks, wash hands frequently: yes    Medical Bed:   Is patient in a medical bed? no   If medical bed is in use, has nursing secured room while patient is awake and out of the room? NA  Has safety checks by nursing been completed on the bed/room this shift? NA    Protective Factors:    Patient identifies protective factors with nursing staff as follows: Identifies reasons for living: Yes   Supportive Social Network or family: Yes    Belief that suicide is immoral/high spirituality: No   Responsibility to family or others/living with family: Yes   Fear of death or dying due to pain and suffering: No   Engaged in work or school: No     If Patient is unable to identify, reason why? PT is not Zoroastrian and guarded about personal life      PSYCH:    Depression: 0   Anxiety: 8   SI denies suicidal ideation   HI Negative for homicidal ideation      AVH:no If Hallucinations are present, describe? N/A      GENERAL:    Appetite: Good  Percent Meals: N/A   Social: Yes   Speech: normal   Appearance: appropriately dressed and healthy looking    GROUP:    Group Participation: No  Participation Quality: None    Notes:   PT seen in TV area, dinning room and room this shift. PT has improved socially at this encounter.  PT seen in room for this encounter and has fair eye contact and cooperative. PT is quiet spoken and withdrawn to conversation with nurse.        Electronically signed by Mercedes Irizarry RN on 9/9/22 at 3:03 AM KRISTOPHERT

## 2022-09-09 NOTE — PLAN OF CARE
Group Therapy Note     Date: 9/9/2022  Start Time: 1100  End Time:  5140  Number of Participants: 6     Type of Group: Psychoeducation     Wellness Binder Information  Module Name:  Staying well  Session Number:  1     Patient's Goal:  validation of feelings     Notes:  pt acknowledged to have feelings validated It may be necessary to share feelings with others.      Status After Intervention:  Improved     Participation Level: Interactive     Participation Quality: Appropriate, Attentive, and Sharing        Speech:  normal        Thought Process/Content: Logical        Affective Functioning: Congruent        Mood: congruent        Level of consciousness:  Alert, Oriented x4, and Attentive        Response to Learning: Able to verbalize current knowledge/experience        Endings: None Reported     Modes of Intervention: Education        Discipline Responsible: Psychoeducational Specialist        Signature:  Teresita Lane
Problem: Anxiety  Goal: Will report anxiety at manageable levels  9/7/2022 1140 by Sydni Frances  Outcome: Progressing   Group Therapy Note     Date: 9/7/2022  Start Time: 1100  End Time:  8343  Number of Participants: 6     Type of Group: Psychoeducation     Wellness Binder Information  Module Name:  Emotional wellness  Session Number:  1     Patient's Goal:  validation of feelings     Notes:  pt acknowledged to have feelings validated it may be necessary to share feelings with others.      Status After Intervention:  Improved     Participation Level: Interactive     Participation Quality: Appropriate, Attentive, and Sharing        Speech:  normal        Thought Process/Content: Logical        Affective Functioning: Congruent        Mood: congruent        Level of consciousness:  Alert, Oriented x4, and Attentive        Response to Learning: Able to verbalize current knowledge/experience        Endings: None Reported     Modes of Intervention: Education        Discipline Responsible: Psychoeducational Specialist        Signature:  Sydni Frances
Problem: Anxiety  Goal: Will report anxiety at manageable levels  9/7/2022 1605 by David Leggett  Outcome: Progressing                                                                       Group Therapy Note    Date: 9/7/2022  Start Time: 1010  End Time:  1600  Number of Participants: 5    Type of Group: Psychoeducation    Wellness Binder Information  Module Name:  Staying well  Session Number:  1    Patient's Goal:  daily maintenance and coping skills    Notes:  pt acknowledged use of positive coping skills daily to help stay well.     Status After Intervention:  Improved    Participation Level: Interactive    Participation Quality: Appropriate, Attentive, and Sharing      Speech:  normal      Thought Process/Content: Logical      Affective Functioning: Congruent      Mood: congruent      Level of consciousness:  Alert, Oriented x4, and Attentive      Response to Learning: Able to verbalize current knowledge/experience      Endings: None Reported    Modes of Intervention: Education      Discipline Responsible: Psychoeducational Specialist      Signature:  David Leggett
Problem: Self Harm/Suicidality  Goal: Will have no self-injury during hospital stay  Description: INTERVENTIONS:  1. Q 30 MINUTES: Routine safety checks  2. Q SHIFT & PRN: Assess risk to determine if routine checks are adequate to maintain patient safety  9/8/2022 1558 by Sharan Cardenas RN  Outcome: Progressing  9/8/2022 1337 by Salma Hope LPC  Outcome: Progressing
Problem: Self Harm/Suicidality  Goal: Will have no self-injury during hospital stay  Description: INTERVENTIONS:  1. Q 30 MINUTES: Routine safety checks  2. Q SHIFT & PRN: Assess risk to determine if routine checks are adequate to maintain patient safety  9/9/2022 1028 by Rhoda Belcher RN  Outcome: Completed      Group Therapy Note     Date: 9/9/2022  Start Time: 1000  End Time:  0522  Number of Participants: 6     Type of Group: Psychotherapy     Patient's Goal: Patient will process emotions and actions and how to relate to other or their with others. Patient discussed open communication and feelings and emotions. Notes:  Patient attended process group as scheduled, patient identified a issue to work on today regarding how they will interact and relate to others. Status After Intervention:  Improved     Participation Level:  Active Listener     Participation Quality: Appropriate        Speech:  normal        Thought Process/Content: Logical        Affective Functioning: Congruent        Mood: euthymic        Level of consciousness:  Alert        Response to Learning: Able to verbalize current knowledge/experience        Endings: None Reported     Modes of Intervention: Education, Support, and Socialization        Discipline Responsible: /Counselor        Signature:  [facility]ine [facility]
Problem: Self Harm/Suicidality  Goal: Will have no self-injury during hospital stay  Description: INTERVENTIONS:  1. Q 30 MINUTES: Routine safety checks  2. Q SHIFT & PRN: Assess risk to determine if routine checks are adequate to maintain patient safety  Outcome: Completed
Problem: Self Harm/Suicidality  Goal: Will have no self-injury during hospital stay  Description: INTERVENTIONS:  1. Q 30 MINUTES: Routine safety checks  2. Q SHIFT & PRN: Assess risk to determine if routine checks are adequate to maintain patient safety  Outcome: Progressing     Group Therapy Note     Date: 9/8/2022  Start Time: 1000  End Time:  0403  Number of Participants: 7     Type of Group: Psychotherapy     Patient's Goal: Patient will process emotions and actions and how to relate to other or their with others. Patient discussed open communication and feelings and emotions. Notes:  Patient attended process group as scheduled, patient identified a issue to work on today regarding how they will interact and relate to others. Status After Intervention:  Improved     Participation Level:  Active Listener     Participation Quality: Appropriate, Attentive, and Sharing        Speech:  normal        Thought Process/Content: Logical        Affective Functioning: Congruent        Mood: euthymic        Level of consciousness:  Alert        Response to Learning: Able to verbalize current knowledge/experience        Endings: None Reported     Modes of Intervention: Education, Support, and Socialization        Discipline Responsible: /Counselor        Signature:  Jean Acosta, [facility]
status  3. Provide emotional support with 1:1 interaction with staff  4. Encourage  recovery focused treatment   Outcome: Progressing     Problem: Sleep Disturbance  Goal: Will exhibit normal sleeping pattern  Description: INTERVENTIONS:  1. Administer medication as ordered  2. Decrease environmental stimuli, including noise, as appropriate  3.  Discourage social isolation and naps during the day  Outcome: Progressing

## 2022-09-09 NOTE — PROGRESS NOTES
[facility] Adult Unit Daily Assessment  Nursing Progress Note    Room: [facility]/607-02   Name: Neil Sanchez   Age: 36 y.o. Gender: male   Dx: Bipolar affective disorder, most recent episode mixed ([address]  Precautions: suicide risk  Inpatient Status: voluntary       SLEEP:    Sleep Quality Fair  Sleep Medications:    PRN Sleep Meds:        MEDICAL:    Other PRN Meds:    Med Compliant: Yes  Accu-Chek: No  Oxygen/CPAP/BiPAP: No  CIWA/CINA: No   PAIN Assessment: none  Side Effects from medication: No    COVID Teaching:    Is Patient experiencing any respiratory symptoms (headache, fever, body aches, cough. Magnolia Fanevelyn ): no  Patient educated by nursing to practice social distancing, wear masks, wash hands frequently: yes    Medical Bed:   Is patient in a medical bed? no   If medical bed is in use, has nursing secured room while patient is awake and out of the room? NA  Has safety checks by nursing been completed on the bed/room this shift? NA    Protective Factors:    Patient identifies protective factors with nursing staff as follows: Identifies reasons for living: Yes   Supportive Social Network or family: Yes    Belief that suicide is immoral/high spirituality: No   Responsibility to family or others/living with family: Yes   Fear of death or dying due to pain and suffering: No   Engaged in work or school: No     If Patient is unable to identify, reason why? N/A      PSYCH:    Depression: improved  Anxiety: improved   SI denies suicidal ideation   HI Negative for homicidal ideation      AVH:no If Hallucinations are present, describe? GENERAL:    Appetite: good   Percent Meals: 75%   Social: Yes   Speech: normal   Appearance: appropriately dressed and healthy looking    GROUP:    Group Participation: Yes  Participation Quality: Active Listener    Notes:  Patient is up for medications and breakfast. Reports fair sleep. Depression and anxiety are improving.  Patient is sitting in the dinning room late in the morning eating a sandwich and sitting with another patient. He met with his provider and discharge is noted.          Electronically signed by Himanshu Celestin RN on 9/9/22 at 10:48 AM CDT

## 2022-09-09 NOTE — PROGRESS NOTES
Behavioral Health   Discharge Note  Bridge Appointment completed: Reviewed Discharge Instructions with patient. Patient verbalizes understanding and agreement with the discharge plan using the teachback method. Referral for Outpatient Tobacco Cessation Counseling, upon discharge (nohemi X if applicable and completed):    ( )  Hospital staff assisted patient to call Quit Line or faxed referral                                   during hospitalization                  ( )  Recognizing danger situations (included triggers and roadblocks), if not completed on admission                    ( )  Coping skills (new ways to manage stress, exercise, relaxation techniques, changing routine, distraction), if not completed on admission                                                           ( )  Basic information about quitting (benefits of quitting, techniques in how to quit, available resources, if not completed on admission  ( ) Referral for counseling faxed to [facility]   ( ) Patient refused referral  ( ) Patient refused counseling  (x ) Patient refused smoking cessation medication upon discharge    Vaccinations (nohemi X if applicable and completed):  ( ) Patient states already received influenza vaccine elsewhere  ( ) Patient received influenza vaccine during this hospitalization  ( x) Patient refused influenza vaccine at this time      Pt discharged with followings belongings:   Dental Appliances: None  Vision - Corrective Lenses: Contact Lenses  Hearing Aid: None  Jewelry: Watch (Watch in safe)  Body Piercings Removed: N/A  Clothing: Pants, Shirt, Socks, Undergarments, Footwear  Other Valuables: At home   Valuables sent home with  patient, watch and a lighter. Valuables retrieved from safe and returned to patient. yesPatient left department with staff via ambulatory discharged to self care. Going to rehab, [facility].  Patient education on aftercare instructions: yes  Patient verbalize understanding of AVS:  yes. Suicidal Ideations? No AVH? HI? Negative for homicidal ideation       Status EXAM upon discharge:  Mental Status and Behavioral Exam  Normal: No  Level of Assistance: Independent/Self  Facial Expression: Avoids Gaze  Affect: Appropriate  Level of Consciousness: Alert  Frequency of Checks: 4 times per hour, close  Mood:Normal: No  Mood: Depressed, Anxious (Anxiety: 8)  Motor Activity:Normal: Yes  Motor Activity: Decreased  Eye Contact: Fair  Observed Behavior: Cooperative  Sexual Misconduct History: Current - no  Preception: Clint to person, Clint to time, Clint to place, Clint to situation  Attention:Normal: Yes  Attention: Distractible  Thought Processes: Blocking  Thought Content:Normal: No  Thought Content: Preoccupations, Poverty of content  Depression Symptoms: No problems reported or observed. Anxiety Symptoms: Unexplained fears  Mellisa Symptoms: No problems reported or observed. Hallucinations: None  Delusions: No  Memory:Normal: Yes  Memory: Other (comment) (seems intact at this encounter)  Insight and Judgment: No  Insight and Judgment: Poor judgment    AVS/Transition Record has been discussed with patient and a copy was given to the patient. The AVS/Transition Record was faxed to the next level of care today. Patient met with his provider and discharge is noted. Patient had valuables returned in full. Foundation medication is provided. Patient is discharging to "[facility] in [place]. Discharged per ambulatory. Transported per Teresita Dan.

## 2022-09-10 NOTE — PROGRESS NOTES
Progress Note  Van Milton  9/9/2022 11:47 PM  Subjective:   Admit Date:   9/6/2022      CC/ADMIT DX:       Interval History:   Reviewed overnight events and nursing notes. He denies any new physical complaints. I have reviewed all labs/diagnostics from the last 24hrs. ROS:   I have done a 10 point ROS and all are negative, except what is mentioned in the HPI. No diet orders on file    Medications:   REM  REM          Objective:   Vitals: /70   Pulse 99   Temp 97.3 °F (36.3 °C) (Temporal)   Resp 16   Ht 5' 11\" (1.803 m)   Wt 136 lb 4 oz (61.8 kg)   SpO2 100%   BMI 19.00 kg/m²  No intake or output data in the 24 hours ending 09/09/22 3110  General appearance: alert and cooperative with exam  Extremities: extremities normal, atraumatic, no cyanosis or edema  Neurologic:  No obvious focal neurologic deficits. Skin: no rashes    Assessment and Plan:   Principal Problem:    Bipolar affective disorder, most recent episode mixed ([address]  Active Problems:    Anxiety disorder, unspecified    Tobacco use disorder    Depression with suicidal ideation  Resolved Problems:    * No resolved hospital problems. *    Vit D Def    Plan:   Continue present medication(s)    He is medically stable. I will monitor for any changes or concerns. Follow with Psych      Discharge planning:   home     Reviewed treatment plans with the patient and/or family.              Electronically signed by Bret Cantrell MD on 9/9/2022 at 11:47 PM

## 2022-12-11 ENCOUNTER — HOSPITAL ENCOUNTER (INPATIENT)
Age: 32
LOS: 4 days | Discharge: HOME | DRG: 885 | End: 2022-12-15
Attending: EMERGENCY MEDICINE | Admitting: PSYCHIATRY & NEUROLOGY
Payer: MEDICAID

## 2022-12-11 DIAGNOSIS — F10.920 ACUTE ALCOHOLIC INTOXICATION WITHOUT COMPLICATION (HCC): ICD-10-CM

## 2022-12-11 DIAGNOSIS — R45.851 DEPRESSION WITH SUICIDAL IDEATION: Primary | ICD-10-CM

## 2022-12-11 DIAGNOSIS — F32.A DEPRESSION WITH SUICIDAL IDEATION: Primary | ICD-10-CM

## 2022-12-11 LAB
ALBUMIN SERPL-MCNC: 4.6 G/DL (ref 3.5–5.2)
ALP BLD-CCNC: 78 U/L (ref 40–130)
ALT SERPL-CCNC: 11 U/L (ref 5–41)
AMPHETAMINE SCREEN, URINE: NEGATIVE
ANION GAP SERPL CALCULATED.3IONS-SCNC: 11 MMOL/L (ref 7–19)
AST SERPL-CCNC: 21 U/L (ref 5–40)
BACTERIA: NEGATIVE /HPF
BARBITURATE SCREEN URINE: NEGATIVE
BASOPHILS ABSOLUTE: 0 K/UL (ref 0–0.2)
BASOPHILS RELATIVE PERCENT: 0.5 % (ref 0–1)
BENZODIAZEPINE SCREEN, URINE: NEGATIVE
BILIRUB SERPL-MCNC: <0.2 MG/DL (ref 0.2–1.2)
BILIRUBIN URINE: NEGATIVE
BLOOD, URINE: NEGATIVE
BUN BLDV-MCNC: 11 MG/DL (ref 6–20)
BUPRENORPHINE URINE: NEGATIVE
CALCIUM SERPL-MCNC: 8.9 MG/DL (ref 8.6–10)
CANNABINOID SCREEN URINE: NEGATIVE
CHLORIDE BLD-SCNC: 103 MMOL/L (ref 98–111)
CLARITY: CLEAR
CO2: 29 MMOL/L (ref 22–29)
COCAINE METABOLITE SCREEN URINE: NEGATIVE
COLOR: YELLOW
CREAT SERPL-MCNC: 0.6 MG/DL (ref 0.5–1.2)
CRYSTALS, UA: ABNORMAL /HPF
EOSINOPHILS ABSOLUTE: 0.1 K/UL (ref 0–0.6)
EOSINOPHILS RELATIVE PERCENT: 1.2 % (ref 0–5)
EPITHELIAL CELLS, UA: 2 /HPF (ref 0–5)
ETHANOL: 171 MG/DL (ref 0–0.08)
ETHANOL: 55 MG/DL (ref 0–0.08)
GFR SERPL CREATININE-BSD FRML MDRD: >60 ML/MIN/{1.73_M2}
GLUCOSE BLD-MCNC: 110 MG/DL (ref 74–109)
GLUCOSE URINE: NEGATIVE MG/DL
HCT VFR BLD CALC: 43.1 % (ref 42–52)
HEMOGLOBIN: 14.8 G/DL (ref 14–18)
HYALINE CASTS: 7 /HPF (ref 0–8)
IMMATURE GRANULOCYTES #: 0 K/UL
KETONES, URINE: ABNORMAL MG/DL
LEUKOCYTE ESTERASE, URINE: NEGATIVE
LYMPHOCYTES ABSOLUTE: 2.4 K/UL (ref 1.1–4.5)
LYMPHOCYTES RELATIVE PERCENT: 28.1 % (ref 20–40)
Lab: NORMAL
MCH RBC QN AUTO: 30.6 PG (ref 27–31)
MCHC RBC AUTO-ENTMCNC: 34.3 G/DL (ref 33–37)
MCV RBC AUTO: 89 FL (ref 80–94)
METHADONE SCREEN, URINE: NEGATIVE
METHAMPHETAMINE, URINE: NEGATIVE
MONOCYTES ABSOLUTE: 0.3 K/UL (ref 0–0.9)
MONOCYTES RELATIVE PERCENT: 4 % (ref 0–10)
NEUTROPHILS ABSOLUTE: 5.6 K/UL (ref 1.5–7.5)
NEUTROPHILS RELATIVE PERCENT: 66 % (ref 50–65)
NITRITE, URINE: NEGATIVE
OPIATE SCREEN URINE: NEGATIVE
OXYCODONE URINE: NEGATIVE
PDW BLD-RTO: 12.4 % (ref 11.5–14.5)
PH UA: 6.5 (ref 5–8)
PHENCYCLIDINE SCREEN URINE: NEGATIVE
PLATELET # BLD: 323 K/UL (ref 130–400)
PMV BLD AUTO: 10.2 FL (ref 9.4–12.4)
POTASSIUM SERPL-SCNC: 3.3 MMOL/L (ref 3.5–5)
PROPOXYPHENE SCREEN: NEGATIVE
PROTEIN UA: 30 MG/DL
RBC # BLD: 4.84 M/UL (ref 4.7–6.1)
RBC UA: 1 /HPF (ref 0–4)
SARS-COV-2, NAAT: NOT DETECTED
SODIUM BLD-SCNC: 143 MMOL/L (ref 136–145)
SPECIFIC GRAVITY UA: 1.02 (ref 1–1.03)
TOTAL PROTEIN: 7.6 G/DL (ref 6.6–8.7)
TRICYCLIC, URINE: NEGATIVE
UROBILINOGEN, URINE: 1 E.U./DL
WBC # BLD: 8.5 K/UL (ref 4.8–10.8)
WBC UA: 1 /HPF (ref 0–5)

## 2022-12-11 PROCEDURE — 82077 ASSAY SPEC XCP UR&BREATH IA: CPT

## 2022-12-11 PROCEDURE — 6370000000 HC RX 637 (ALT 250 FOR IP): Performed by: PSYCHIATRY & NEUROLOGY

## 2022-12-11 PROCEDURE — 87635 SARS-COV-2 COVID-19 AMP PRB: CPT

## 2022-12-11 PROCEDURE — 36415 COLL VENOUS BLD VENIPUNCTURE: CPT

## 2022-12-11 PROCEDURE — 85025 COMPLETE CBC W/AUTO DIFF WBC: CPT

## 2022-12-11 PROCEDURE — 80053 COMPREHEN METABOLIC PANEL: CPT

## 2022-12-11 PROCEDURE — 1240000000 HC EMOTIONAL WELLNESS R&B

## 2022-12-11 PROCEDURE — 81001 URINALYSIS AUTO W/SCOPE: CPT

## 2022-12-11 PROCEDURE — 80306 DRUG TEST PRSMV INSTRMNT: CPT

## 2022-12-11 PROCEDURE — 99285 EMERGENCY DEPT VISIT HI MDM: CPT

## 2022-12-11 RX ORDER — NICOTINE 21 MG/24HR
1 PATCH, TRANSDERMAL 24 HOURS TRANSDERMAL DAILY
Status: DISCONTINUED | OUTPATIENT
Start: 2022-12-11 | End: 2022-12-15 | Stop reason: HOSPADM

## 2022-12-11 RX ORDER — ACETAMINOPHEN 325 MG/1
650 TABLET ORAL EVERY 4 HOURS PRN
Status: DISCONTINUED | OUTPATIENT
Start: 2022-12-11 | End: 2022-12-15 | Stop reason: HOSPADM

## 2022-12-11 RX ORDER — POLYETHYLENE GLYCOL 3350 17 G/17G
17 POWDER, FOR SOLUTION ORAL DAILY PRN
Status: DISCONTINUED | OUTPATIENT
Start: 2022-12-11 | End: 2022-12-15 | Stop reason: HOSPADM

## 2022-12-11 RX ORDER — LORAZEPAM 1 MG/1
1 TABLET ORAL EVERY 4 HOURS PRN
Status: DISCONTINUED | OUTPATIENT
Start: 2022-12-11 | End: 2022-12-12

## 2022-12-11 RX ORDER — TRAZODONE HYDROCHLORIDE 50 MG/1
50 TABLET ORAL NIGHTLY
Status: DISCONTINUED | OUTPATIENT
Start: 2022-12-11 | End: 2022-12-15 | Stop reason: HOSPADM

## 2022-12-11 RX ORDER — LORAZEPAM 2 MG/1
2 TABLET ORAL EVERY 4 HOURS PRN
Status: DISCONTINUED | OUTPATIENT
Start: 2022-12-11 | End: 2022-12-12

## 2022-12-11 RX ADMIN — TRAZODONE HYDROCHLORIDE 50 MG: 50 TABLET ORAL at 22:37

## 2022-12-11 ASSESSMENT — ENCOUNTER SYMPTOMS
EYE REDNESS: 0
SHORTNESS OF BREATH: 0
COUGH: 0
ABDOMINAL PAIN: 0
VOICE CHANGE: 0
RHINORRHEA: 0
EYE PAIN: 0
VOMITING: 0
DIARRHEA: 0

## 2022-12-11 ASSESSMENT — LIFESTYLE VARIABLES
HOW OFTEN DO YOU HAVE A DRINK CONTAINING ALCOHOL: 4 OR MORE TIMES A WEEK
HOW MANY STANDARD DRINKS CONTAINING ALCOHOL DO YOU HAVE ON A TYPICAL DAY: 10 OR MORE

## 2022-12-11 ASSESSMENT — SLEEP AND FATIGUE QUESTIONNAIRES
DO YOU USE A SLEEP AID: NO
AVERAGE NUMBER OF SLEEP HOURS: 8
DO YOU HAVE DIFFICULTY SLEEPING: NO

## 2022-12-11 ASSESSMENT — PAIN - FUNCTIONAL ASSESSMENT: PAIN_FUNCTIONAL_ASSESSMENT: NONE - DENIES PAIN

## 2022-12-11 ASSESSMENT — PATIENT HEALTH QUESTIONNAIRE - PHQ9: SUM OF ALL RESPONSES TO PHQ QUESTIONS 1-9: 24

## 2022-12-11 NOTE — ED PROVIDER NOTES
[facility] EMERGENCY DEPT  EMERGENCY DEPARTMENT ENCOUNTER      Pt Name: Francisca Tejeda  MRN: [id number]  [place] 1990  Date of evaluation: 12/11/2022  Provider: Kelly Bhakta MD    CHIEF COMPLAINT       Chief Complaint   Patient presents with    Suicidal     Patient states that he woke up this morning having suicidal thoughts, denies a plan at this time, patient does states that he drinks about \"a pint\" of alcohol a day last drank last night         HISTORY OF PRESENT ILLNESS   (Location/Symptom, Timing/Onset,Context/Setting, Quality, Duration, Modifying Factors, Severity)  Note limiting factors. Francisca Tejeda is a 29 y.o. male who presents to the emergency department with complaint of suicidal thoughts that started this morning. Says he just keeps seeing him hurt himself in many different ways. Denies any preceding trigger. No hallucinations or other sxs  Was hospitalized here in september with similar issues. Says since then he hasn't felt normal but today was the first time since then he has had suicidal thoughts. Not currently taking his medications since he got arrested shortly after getting out of the hospital.    HPI    NursingNotes were reviewed. REVIEW OF SYSTEMS    (2-9 systems for level 4, 10 or more for level 5)     Review of Systems   Constitutional:  Negative for fatigue and fever. HENT:  Negative for congestion, rhinorrhea and voice change. Eyes:  Negative for pain and redness. Respiratory:  Negative for cough and shortness of breath. Cardiovascular:  Negative for chest pain. Gastrointestinal:  Negative for abdominal pain, diarrhea and vomiting. Endocrine: Negative. Genitourinary: Negative. Musculoskeletal:  Negative for arthralgias and gait problem. Skin:  Negative for rash and wound. Neurological:  Negative for weakness and headaches. Hematological: Negative. All other systems reviewed and are negative.     A complete review of systems was performed and is negative except as noted above in the HPI. PAST MEDICAL HISTORY     Past Medical History:   Diagnosis Date    Alcohol abuse     Bipolar 1 disorder ([facility] [address]     Post traumatic stress disorder (PTSD)          SURGICAL HISTORY       Past Surgical History:   Procedure Laterality Date    OTHER SURGICAL HISTORY      cut tendon in right little finger         CURRENT MEDICATIONS       Previous Medications    BUPROPION (WELLBUTRIN XL) 150 MG EXTENDED RELEASE TABLET    Take 1 tablet by mouth daily    DIVALPROEX (DEPAKOTE ER) 250 MG EXTENDED RELEASE TABLET    Take 3 tablets by mouth daily    ERGOCALCIFEROL (VITAMIN D) 77604 UNITS CAPS    Take 50,000 Units by mouth once a week for 11 doses    TRAZODONE (DESYREL) 50 MG TABLET    Take 1 tablet by mouth nightly    VITAMIN B-12 500 MCG TABLET    Take 1 tablet by mouth daily       ALLERGIES     Patient has no known allergies. FAMILY HISTORY       Family History   Problem Relation Age of Onset    Other Father           SOCIAL HISTORY       Social History     Socioeconomic History    Marital status: Single     Spouse name: None    Number of children: 0    Years of education: None    Highest education level: None   Tobacco Use    Smoking status: Every Day     Packs/day: 1.00     Years: 10.00     Pack years: 10.00     Types: Cigarettes    Smokeless tobacco: Never   Vaping Use    Vaping Use: Never used   Substance and Sexual Activity    Alcohol use: Not Currently     Comment: Has not partook in ETOH since approx.  8/2020    Drug use: Not Currently     Comment: Not since 8/2020    Sexual activity: Yes     Partners: Female       SCREENINGS    Clara Coma Scale  Eye Opening: Spontaneous  Best Verbal Response: Oriented  Best Motor Response: Obeys commands  Clara Coma Scale Score: 15        PHYSICAL EXAM    (up to 7 for level 4, 8 or more for level 5)     ED Triage Vitals [12/11/22 0829]   BP Temp Temp Source Heart Rate Resp SpO2 Height Weight   121/72 97.8 °F (36.6 °C) Oral 97 18 96 % 5' 11\" (1.803 m) 135 lb (61.2 kg)       Physical Exam  Vitals and nursing note reviewed. Constitutional:       General: He is not in acute distress. Appearance: He is well-developed. He is not toxic-appearing or diaphoretic. HENT:      Head: Normocephalic and atraumatic. Mouth/Throat:      Mouth: Mucous membranes are moist.      Pharynx: Oropharynx is clear. Eyes:      General: No scleral icterus. Right eye: No discharge. Left eye: No discharge. Pupils: Pupils are equal, round, and reactive to light. Cardiovascular:      Rate and Rhythm: Normal rate and regular rhythm. Pulmonary:      Effort: Pulmonary effort is normal. No respiratory distress. Breath sounds: No stridor. Abdominal:      General: There is no distension. Musculoskeletal:         General: No deformity. Normal range of motion. Cervical back: Normal range of motion. Skin:     General: Skin is warm and dry. Neurological:      General: No focal deficit present. Mental Status: He is alert and oriented to person, place, and time. GCS: GCS eye subscore is 4. GCS verbal subscore is 5. GCS motor subscore is 6. Cranial Nerves: No cranial nerve deficit. Motor: No abnormal muscle tone. Psychiatric:         Behavior: Behavior normal.         Thought Content:  Thought content normal.         Judgment: Judgment normal.       DIAGNOSTIC RESULTS     EKG: All EKG's are interpreted by the Emergency Department Physician who either signs or Co-signs this chart in the absence of a cardiologist.        RADIOLOGY:   Non-plain film images such as CT, Ultrasound and MRI are read by the radiologist. Plainradiographic images are visualized and preliminarily interpreted by the emergency physician with the below findings:        Interpretation per the Radiologist below, if available at the time of this note:    No orders to display         ED BEDSIDE ULTRASOUND:   Performed by ED Physician - none    LABS:  Labs Reviewed   COMPREHENSIVE METABOLIC PANEL - Abnormal; Notable for the following components:       Result Value    Potassium 3.3 (*)     Glucose 110 (*)     All other components within normal limits   CBC WITH AUTO DIFFERENTIAL - Abnormal; Notable for the following components:    Neutrophils % 66.0 (*)     All other components within normal limits   URINALYSIS - Abnormal; Notable for the following components:    Ketones, Urine TRACE (*)     Protein, UA 30 (*)     All other components within normal limits   MICROSCOPIC URINALYSIS - Abnormal; Notable for the following components:    Bacteria, UA NEGATIVE (*)     Crystals, UA NEG (*)     All other components within normal limits   COVID-19, RAPID   ETHANOL   DRUG SCRN, BUPRENORPHINE   ETHANOL       All other labs were within normal range or not returned as of this dictation. EMERGENCY DEPARTMENT COURSE and DIFFERENTIALDIAGNOSIS/MDM:   Vitals:    Vitals:    12/11/22 0829 12/11/22 0855   BP: 121/72 115/71   Pulse: 97 90   Resp: 18 14   Temp: 97.8 °F (36.6 °C) 98.1 °F (36.7 °C)   TempSrc: Oral Oral   SpO2: 96% 96%   Weight: 135 lb (61.2 kg) 135 lb (61.2 kg)   Height: 5' 11\" (1.803 m) 5' 11\" (1.803 m)       [facility]    ED Course as of 12/11/22 1507   Sun Dec 11, 2022   1127 Ethanol Lvl: 171 [JALIL]      ED Course User Index  [JALIL] Ottoniel Bowens MD     Based on the evaluation and work-up here patient is felt to require further monitoring, work-up, or treatment that is available in the emergency department. Case was discussed with psychiatry who agrees for observation or admission for further management. Treatment and stabilization as necessary were provided in the emergency department prior to transfer of care to the [address] [place] [address]. CONSULTS:  None    PROCEDURES:  Unless otherwise notedbelow, none     Procedures    FINAL IMPRESSION     1. Depression with suicidal ideation    2.  Acute alcoholic intoxication without complication ([facility] [address] DISPOSITION/PLAN   DISPOSITION Decision To Admit 12/11/2022 03:02:55 PM      No notes of EC Admission Criteria type on file. PATIENT REFERRED TO:  No follow-up provider specified.     DISCHARGE MEDICATIONS:  New Prescriptions    No medications on file          (Please note that portions of this note were completed with a voice recognition program.  Efforts were made to edit the dictations butoccasionally words are mis-transcribed.)    Claire Oglesby MD (electronically signed)  Emergency Physician         Claire Lucero MD  12/11/22 5328

## 2022-12-11 NOTE — PROGRESS NOTES
[facility] ADULT INITIAL INTAKE ASSESSMENT     12/11/22    Asha Knapp ,a 29 y.o. male, presents to the ED for a psychiatric assessment. ED Arrival time: [address]  [facility] Notification time: in ED  [facility] Assessment start time: 1420  Psychiatrist call time: [phone] with Dr. Sakina Perez    Patient is referred by: Self    Reason for visit to ED - Presenting problem:     PT states reason for ED visit, \"I started having suicidal thoughts this morning. I got out of here in September and then I went to senior living and only got out about 4 days ago. I haven't had my meds. I keep seeing myself hurt myself in many different ways. \"    Patient seen in ED exam room 20 lying on bed. Patient is dressed in paper scrubs, has one-to-one sitter at bedside, is calm, cooperative and agreeable to interview. He is homeless. He reports history of bipolar disorder, alcohol use disorder, cannabis use disorder and methamphetamine use disorder. Last use of methamphetamine and cannabis was in September 2022. Patient has been drinking alcohol almost daily. \"I had a few shots this morning before coming here. \" Ethanol level was 171 upon arrival to the ED, currently is 55. Patient denies alcohol withdrawals when he stops drinking and no history of withdrawal seizures. Longest period of sobriety has been about a year since the age of 17 when he first started using. He endorses suicidal ideations without specific plan. He reports to writer that he has seen himself hurting himself many different ways but has no actual attempts. Previous hospitalizations at [facility], last in September 2022. He denies homicidal ideations and hallucinations. He smokes about a pack of cigarettes a day. ED Physician reports:  Asha Knapp is a 29 y.o. male who presents to the emergency department with complaint of suicidal thoughts that started this morning. Says he just keeps seeing him hurt himself in many different ways.  Denies any preceding trigger. No hallucinations or other sxs  Was hospitalized here in september with similar issues. Says since then he hasn't felt normal but today was the first time since then he has had suicidal thoughts. Not currently taking his medications since he got arrested shortly after getting out of the hospital.    Duration of symptoms: one day    Current Stressors: financial and legal    C-SSRS Completed: yes  Risk Assessment Completed:yes  Risk of Suicide:  Moderate Risk  Provider notified of the C-SSRS Screening and Risk Assessment Findings:yes    SI:  has   Plan: no   Past SI attempts: no   If yes, when and how many times:  Describe suicide attempts:   HI: denies  If yes describe:   Delusions: denies  If yes describe:   Hallucinations: denies   If yes describe:   Risk of Harm to self: Self injurious/self mutilation behaviorsno   If yes explain:   Was it within the past 6 months: no   Risk of Harm to others: no   If yes explain:   Was it within the past 6 months: no   Trauma History: denies  Anxiety 1-10:  10  Explain if increased:   Depression 1-10:  10  Explain if increased:   Level of function outside hospital decreased: yes   If yes explain: feelings of hopelessness, helplessness and worthlessness  Has patient been completing ADL's: no    Mental Status Evaluation:     Appearance:  age appropriate and tattooed   Behavior:  Within Normal Limits   Speech:  normal pitch and normal volume   Mood:  anxious and depressed   Affect:  flat   Thought Process:  within normal limits   Thought Content:  suicidal   Sensorium:  person, place, time/date, and situation   Cognition:  grossly intact   Insight:  limited         Psychiatric Hospitalizations: Yes   Where & When: [facility] multiple, last admission was 09/2022  Outpatient Psychiatric Treatment: lost to follow up    Family History:    Family history of mental illness: no   \"Depression\",\"Anxiety\",\"Bipolar\",\"Schizophrenia\",\"Borderline\",\"ADHD\"}  Family members with suicide attempt: no   If yes explain (attempted or completed):    Substance Abuse History:     SBIRT Completed: yes  Brief Intervention completed if needed:  (Yes/No)    Current ETOH LEVELS: 55    ETOH Usage:     Amount drinking daily: heavy    Date of last drink: 12/11/22  Longest period of sobriety:    Substance/Chemical Abuse/Recreational Drug History:  Substance used: alcohol, marijuana, and methamphetamines  Date of last substance use: alcohol today, marijuana and methamphetamines was in September 2022  Tobacco Use: yes   History of rehab treatment: yes  How many times in rehab: 3  Last time in rehab: February 2022  Family history of substance abuse:    Opiates: It was discussed with pt they would not be receiving opiates unless they were within 3 days post surgery/acute injury. Patient voiced understanding and agreed. Psychiatric Review Of Systems:     Recent Sleep changes: no   Recent appetite changes: no   Recent weight changes/Pounds gained (+) or lost (-): no      Medical History:     Medical Diagnosis/Issues: Bipolar disorder  CT today in ED:no  Use of 02 or CPAP: no  Ambulatory: yes  Independent or Need assistance with Self Care:     PCP: No primary care provider on file. Current Medications:   Scheduled Meds: No current facility-administered medications for this encounter.     Current Outpatient Medications:     buPROPion (WELLBUTRIN XL) 150 MG extended release tablet, Take 1 tablet by mouth daily (Patient not taking: Reported on 12/11/2022), Disp: 30 tablet, Rfl: 0    divalproex (DEPAKOTE ER) 250 MG extended release tablet, Take 3 tablets by mouth daily (Patient not taking: Reported on 12/11/2022), Disp: 90 tablet, Rfl: 0    traZODone (DESYREL) 50 MG tablet, Take 1 tablet by mouth nightly (Patient not taking: Reported on 12/11/2022), Disp: 30 tablet, Rfl: 0    Ergocalciferol (VITAMIN D) 67237 units CAPS, Take 50,000 Units by mouth once a week for 11 doses, Disp: 5 capsule, Rfl: 0    vitamin B-12 500 MCG tablet, Take 1 tablet by mouth daily (Patient not taking: Reported on 12/11/2022), Disp: 30 tablet, Rfl: 0       Collateral Information:     Name: not needed in ED  Relationship: n/a  Phone Number: n/a  Collateral: n/a    Current living arrangement:Homeless  Current Support System: denies  Employment: unemployed    Disposition:     Choose one of the options below for disposition:     1. Decision to admit to :yes    If yes, which unit Adult or Geriatric Unit:  Adult  Is patient voluntary: yes  If no has a 72 hold been initiated:   Admission Diagnosis: Depression with suicidal ideations    Does the patient have a guardian or Medical POA: no  Has the guardian been notified or Medical POA:       2. Decision to Discharge:   Does not meet criteria for acceptance to   unit due to: n/a    3.  Transferred:       Patient was transferred due to: n/a    Other follow up information provided:      Orlando Gonzalez RN

## 2022-12-11 NOTE — PROGRESS NOTES
[facility]  Nursing Admission Note        Reason for Admission: Pt is a 29year old male who presented to the ED with suicidal ideations without a specific plan. Pt reported he has visualized hurting himself in many different wats, but has no actual suicidal attempts. Pt reports history of bipolar disorder, alcohol use disorder, canaabis use disorder, and methamphetamine use disorder. Reports he is currently homeless. Previous hospitalization at [facility] in September 2022. Denies HI and AVH.     Patient Active Problem List   Diagnosis    Anxiety disorder, unspecified    Bipolar affective disorder, currently depressed, moderate (HCC)    Tobacco use disorder    Bipolar I disorder, most recent episode depressed, severe without psychotic features ([address]    Depression with suicidal ideation    Bipolar disorder, current episode depressed, moderate (HCC)    Other insomnia    ETOH abuse    Bipolar affective disorder, most recent episode mixed ([address]         Addictive Behavior:   Addictive Behavior  In the Past 3 Months, Have You Felt or Has Someone Told You That You Have a Problem With  : None    Medical Problems:   Past Medical History:   Diagnosis Date    Alcohol abuse     Bipolar 1 disorder ([facility] [address]     Post traumatic stress disorder (PTSD)        Status EXAM:  Mental Status and Behavioral Exam  Normal: No  Level of Assistance: Independent/Self  Facial Expression: Flat, Worried  Affect: Constricted  Level of Consciousness: Alert  Frequency of Checks: 4 times per hour, close  Mood:Normal: No  Mood: Depressed, Anxious  Motor Activity:Normal: Yes  Eye Contact: Fair  Observed Behavior: Friendly, Withdrawn, Cooperative  Sexual Misconduct History: Current - no  Preception: [place] to person, [place] to place, [place] to time, [place] to situation  Attention:Normal: Yes  Thought Processes:  (linear)  Depression Symptoms: Change in energy level, Isolative, Loss of interest (rates depression 10)  Anxiety Symptoms: Generalized (rates anxiety 10)  Mellisa Symptoms: No problems reported or observed. Hallucinations: None  Delusions: No  Memory:Normal: Yes  Insight and Judgment: No  Insight and Judgment:  (limited insight and judgment)    Psych:   Suicidal Ideation: yes. If yes, is there a plan? (Describe) no specific plan              Risk of Suicide: Moderate Risk   Homicidal Ideation:  no.  If yes, describe: Auditory/Visual Hallucinations:  no.      If yes, describe AVH? Metabolic Screening:  Lab Results   Component Value Date    LABA1C 4.7 02/07/2022     Lab Results   Component Value Date    CHOL 116 (L) 02/07/2022     Lab Results   Component Value Date    TRIG 98 02/07/2022     Lab Results   Component Value Date    HDL 52 (L) 02/07/2022     No components found for: LDLCAL  No results found for: LABVLDL  Body mass index is 18.83 kg/m². BP Readings from Last 2 Encounters:   12/11/22 101/76   09/09/22 116/70       PATIENT STRENGTHS and Barriers:   Patient Strengths/Barriers  Strengths (Must Choose Two): Motivation level for treatment, Support from family, Support from friends  Barriers: Recreational/leisure/hobbies      Tobacco Screening:  Practical Counseling, on admission, nohemi X, if applicable and completed (first 3 are required if patient doesn't refuse):            Recognizing danger situations (included triggers and roadblocks)                 Coping skills (new ways to manage stress, exercise, relaxation techniques, changing routine, distraction                                                      Basic information about quitting (benefits of quitting, techniques in how to quit, available resources   Referral for counseling faxed to [facility]                                            Patient refused counseling yes  Patient has not smoked in the last 30 days   Patient offered nicotine patch.   Received yes  Refused   Patient is a non-smoker        Admission to Unit:    Pt admitted to [facility] under the care of  Alexander Jones,  arrived on unit via [address] with security and staff from ED    Patient arrived dressed in paper scrubs:  yes. Body assessment and safety check completed by Valente Marinelli RN and Dalton Jimenez RN and  no contraband discovered. Patient belongings and valuables was cataloged and accounted for by kimmy Camp and Valente Marinelli RN. Admission completed by Valente Marinelli RN  Oriented to unit, unit policy and expectations:  yes    Reviewed and explained all legal documents:  yes    Education for [place] and Restraints given: yes    Patient signed all legal documents yes   Pt verbalizes understanding:yes     Gearl People Obtained? yes    Medical Bed:  Does patient require a medical bed? no  If answered yes for medical bed use, does the patient have the following conditions? High risk for falls? NA   Obstructive sleep apnea? NA   Oxygen Use? NA   Use of assistive devices? NA   Other, (explain)? Identifies stressors. yes   financial and legal.      Protective Factors:  Patient identifies protective factors with nursing staff as follows: Identifies reasons for living: Yes   Supportive Social Network or family: No    Belief that suicide is immoral/high spirituality: Yes   Responsibility to family or others/living with family: No   Fear of death or dying due to pain and suffering: Yes   Engaged in work or school: No     If Patient is unable to identify, reason why? Admission Note:    Pt arrived on unit via wheelchair from ED with staff and security. Calm and cooperative. Vitals obtained. Oriented to unit and room. Policies and procedures explained. Pt verbally understood. Legals signed. Will continue to monitor pt.      Electronically signed by Parmjit Vyas RN on 12/11/22 at 5:35 PM CST

## 2022-12-11 NOTE — ED NOTES
Report called and care transferred to [place].       Tiffanie Gimenez, RN  12/11/22 7080

## 2022-12-12 PROBLEM — F31.60 BIPOLAR AFFECTIVE DISORDER, CURRENT EPISODE MIXED, WITHOUT PSYCHOTIC FEATURES (HCC): Status: ACTIVE | Noted: 2022-12-12

## 2022-12-12 PROCEDURE — 1240000000 HC EMOTIONAL WELLNESS R&B

## 2022-12-12 PROCEDURE — 6370000000 HC RX 637 (ALT 250 FOR IP): Performed by: PSYCHIATRY & NEUROLOGY

## 2022-12-12 RX ORDER — HYDROXYZINE HYDROCHLORIDE 25 MG/1
25 TABLET, FILM COATED ORAL 3 TIMES DAILY PRN
Status: DISCONTINUED | OUTPATIENT
Start: 2022-12-12 | End: 2022-12-15 | Stop reason: HOSPADM

## 2022-12-12 RX ORDER — CHOLECALCIFEROL (VITAMIN D3) 125 MCG
500 CAPSULE ORAL DAILY
Status: DISCONTINUED | OUTPATIENT
Start: 2022-12-12 | End: 2022-12-15 | Stop reason: HOSPADM

## 2022-12-12 RX ORDER — BUPROPION HYDROCHLORIDE 150 MG/1
150 TABLET ORAL DAILY
Status: DISCONTINUED | OUTPATIENT
Start: 2022-12-12 | End: 2022-12-15 | Stop reason: HOSPADM

## 2022-12-12 RX ORDER — ERGOCALCIFEROL 1.25 MG/1
50000 CAPSULE ORAL WEEKLY
Status: DISCONTINUED | OUTPATIENT
Start: 2022-12-12 | End: 2022-12-15 | Stop reason: HOSPADM

## 2022-12-12 RX ADMIN — DIVALPROEX SODIUM 750 MG: 500 TABLET, EXTENDED RELEASE ORAL at 10:50

## 2022-12-12 RX ADMIN — HYDROXYZINE HYDROCHLORIDE 25 MG: 25 TABLET, FILM COATED ORAL at 20:56

## 2022-12-12 RX ADMIN — CYANOCOBALAMIN TAB 500 MCG 500 MCG: 500 TAB at 10:50

## 2022-12-12 RX ADMIN — ERGOCALCIFEROL 50000 UNITS: 1.25 CAPSULE ORAL at 10:50

## 2022-12-12 RX ADMIN — TRAZODONE HYDROCHLORIDE 50 MG: 50 TABLET ORAL at 20:56

## 2022-12-12 RX ADMIN — BUPROPION HYDROCHLORIDE 150 MG: 150 TABLET, EXTENDED RELEASE ORAL at 10:50

## 2022-12-12 NOTE — PLAN OF CARE
Problem: Coping  Goal: Pt/Family able to verbalize concerns and demonstrate effective coping strategies  Description: INTERVENTIONS:  1. Assist patient/family to identify coping skills, available support systems and cultural and spiritual values  2. Provide emotional support, including active listening and acknowledgement of concerns of patient and caregivers  3. Reduce environmental stimuli, as able  4. Instruct patient/family in relaxation techniques, as appropriate  5. Assess for spiritual pain/suffering and initiate Spiritual Care, Psychosocial Clinical Specialist consults as needed  Outcome: Progressing  Note:                                                                     Group Therapy Note    Date: 12/12/2022  Start Time: 1000  End Time:  1030  Number of Participants: 11    Type of Group: Psychoeducation    Wellness Binder Information  Module Name:  Men's Issues  Session Number:  1    Group Goal for Pt: To improve knowledge of effective stress management techniques    Notes:  Pt demonstrated improved knowledge of effective stress management techniques by actively participating in group discussion. Status After Intervention:  Unchanged    Participation Level:  Active Listener    Participation Quality: Appropriate      Speech:  normal      Thought Process/Content: Logical      Affective Functioning: Congruent      Mood: anxious and depressed      Level of consciousness:  Alert and Oriented x4      Response to Learning: Able to verbalize current knowledge/experience, Able to verbalize/acknowledge new learning, and Progressing to goal      Endings: None Reported    Modes of Intervention: Education      Discipline Responsible: Psychoeducational Specialist      Signature:  Barron Quintana

## 2022-12-12 NOTE — PROGRESS NOTES
Treatment Team Note:    Target Symptoms/Reason for admission: Per nursing admission assessment - Reason for Admission: Pt is a 29year old male who presented to the ED with suicidal ideations without a specific plan. Pt reported he has visualized hurting himself in many different wats, but has no actual suicidal attempts. Pt reports history of bipolar disorder, alcohol use disorder, canaabis use disorder, and methamphetamine use disorder. Reports he is currently homeless. Previous hospitalization at [facility] in September 2022. Denies HI and AVH. Diagnoses per psych provider: Depression with suicidal ideation [F32. A, T37.194]  Acute alcoholic intoxication without complication ([facility] [address] [R52.309]    Therapist met with treatment team to discuss patients treatment and discharge plans. Patient's aftercare plan is: SW will meet with patient to gather information    Aftercare appointments made: No - SW will make discharge appointments    Pt lives with:  Pt is homeless    Collateral obtained from: SW will meet with patient to gather information  Collateral obtained on: Attending groups: No    Behavior: cooperative    Has patient been completing ADL's:  Yes    SI:  patient reports SI  Plan: no   If yes describe: N/A - patient denies plan  HI:  patient denies HI  If present describe: N/A  Delusions: patient denies delusions  If present describe: N/A  Hallucinations: patient denies hallucinations  If present describe: N/A    Patient rates their -- Depression: 1-10:  10  Anxiety:1-10:  10    Sleeping:New admission - unknown at this time.     Taking medication: Yes    Misc:

## 2022-12-12 NOTE — H&P
Department of Psychiatry  Attending History and Physical - Adult         CHIEF COMPLAINT: Suicidal ideations    History obtained from:  patient    HISTORY OF PRESENT ILLNESS:          The patient is a 29 y.o. male with previous psychiatric history of bipolar disorder, alcohol use disorder, methamphetamine use disorder, cannabis use disorder, history of treatment noncompliance, who has been admitted to our psychiatric unit, secondary to treatment noncompliance and suicidal ideations. Patient is well-known to psychiatry due to multiple previous admissions to our psychiatric unit, with last admission 3 months ago, when he was admitted to our psychiatric unit with same clinical presentation, as at present time. Patient reported that after last discharge from the hospital 3 months ago, he was taking his medications only for 1 month and then stopped taking medications due to lack of the refills. Patient reported that he did not follow with his appointments in [facility]. He reported that he was jailed for 30 days for \"shoplifting\", stated \"I was wrongly accused. I did not steal anything\". He was released from snf 3 days ago, stayed with his friend a few days and was drinking alcohol, a pint of whiskey every day for 3 days and yesterday decided to come to the hospital and ask for help to restart his medications. Today during the interview, patient endorses feeling of depression, anxiety, decreased appetite, decreased quality of sleep, low motivation, low concentration, decreased pleasure in previously enjoyed activities. He denies feeling of hopelessness, helplessness and worthlessness. Patient denies current withdrawal symptoms from alcohol. He denies current active suicidal or homicidal ideations, endorses intermittent suicidal thoughts \"on and off multiple times during the day\", stated that last time when he experienced suicidal ideations is today on the morning after he woke up.   He denies any suicidal plans. Patient denies auditory and visual hallucinations. He did not endorse any delusions or paranoid thoughts. PSYCHIATRIC HISTORY:    Diagnoses: Bipolar disorder, alcohol use disorder, tobacco use disorder, methamphetamine use disorder, cannabis use disorder  Suicide attempts/gestures: Denies   Prior hospitalizations: [facility] for drug use disorder. Multiple to [facility] psychiatric unit with last admission in September 2022  Medication trials: Bupropion, Depakote, Celexa, trazodone, Minipress  Mental health contact: Previously in [facility]. Lost to follow-up   Head trauma: Denies     Past Medical History:        Diagnosis Date    Alcohol abuse     Bipolar 1 disorder ([address]     Post traumatic stress disorder (PTSD)      Past Surgical History:        Procedure Laterality Date    OTHER SURGICAL HISTORY      cut tendon in right little finger     Medications Prior to Admission:   Medications Prior to Admission: buPROPion (WELLBUTRIN XL) 150 MG extended release tablet, Take 1 tablet by mouth daily (Patient not taking: Reported on 12/11/2022)  divalproex (DEPAKOTE ER) 250 MG extended release tablet, Take 3 tablets by mouth daily (Patient not taking: Reported on 12/11/2022)  traZODone (DESYREL) 50 MG tablet, Take 1 tablet by mouth nightly (Patient not taking: Reported on 12/11/2022)  Ergocalciferol (VITAMIN D) 27713 units CAPS, Take 50,000 Units by mouth once a week for 11 doses  vitamin B-12 500 MCG tablet, Take 1 tablet by mouth daily (Patient not taking: Reported on 12/11/2022)    Allergies:  Patient has no known allergies. Social History:  Patient reported that he is currently homeless, he was unemployed for the last 10 years. Patient reported that his parents support him financially and provide him with food. Smoking-1 PPD  Alcohol-drinks pint of whiskey a day, 3 times a week, stated \"I would drink every day, if I could\".   Patient denies history of withdrawal seizures or DTs when he stopped drinking alcohol. Illicit substances-reported history of smoking marijuana and using methamphetamine. Stated that last time when he used both substances is 3 months ago. Family History:       Problem Relation Age of Onset    Other Father      Psychiatric Family History  Patient reported that his both parents were diagnosed with bipolar disorder. He stated that his biological father committed suicide at age 28years old. REVIEW OF SYSTEMS:  General: Endorses feelings of depression, anxiety, decreased appetite, decreased sleep. No fevers, chills, night sweats, no recent weight loss or gain. Head: No headache, no migraine. Eyes: No recent visual changes. Ears: No recent hearing changes. Nose: No increased congestion or change in sense of smell. Throat: No sore throat, no trouble swallowing. Cardiovascular: No chest pain or palpitations, or dizziness. Respiratory: No cough, wheezes, congestion, or shortness of breath. Gastrointestinal: No abdominal pain, nausea or vomiting, no diarrhea or constipation. Musculo-skeletal: No edema, deformities, or loss of functions. Neurological: No loss of consciousness, abnormal sensations, or weakness. Skin: No rash, abrasions or bruises. PHYSICAL EXAM:    Vitals:  /85   Pulse 92   Temp 98.1 °F (36.7 °C) (Temporal)   Resp 16   Ht 5' 11\" (1.803 m)   Wt 135 lb (61.2 kg)   SpO2 98%   BMI 18.83 kg/m²     Mental Status Examination:    Appearance: Appropriately groomed, wearing casual civilian clothes. Made good eye contact. Behavior: Calm, cooperative and socially appropriate. No psychomotor retardation/agitation appreciated. Gait unremarkable. Speech: Normal in tone, volume, and quality. Mood: \"Depressed, anxious\"   Affect: Mood congruent. Range is mildly restricted  Thought Process: Mostly linear and goal oriented  Thought Content: Patient does have intermittent suicidal thoughts.   He does not have any homicidal 12/11/2022 08:49 AM    MONOSABS 0.30 12/11/2022 08:49 AM    LYMPHSABS 2.4 12/11/2022 08:49 AM    EOSABS 0.10 12/11/2022 08:49 AM    BASOSABS 0.00 12/11/2022 08:49 AM     CMP:    Lab Results   Component Value Date/Time     12/11/2022 08:49 AM    K 3.3 12/11/2022 08:49 AM    K 4.1 04/14/2022 05:54 AM     12/11/2022 08:49 AM    CO2 29 12/11/2022 08:49 AM    BUN 11 12/11/2022 08:49 AM    CREATININE 0.6 12/11/2022 08:49 AM    GFRAA >59 09/06/2022 06:27 PM    LABGLOM >60 12/11/2022 08:49 AM    GLUCOSE 110 12/11/2022 08:49 AM    PROT 7.6 12/11/2022 08:49 AM    LABALBU 4.6 12/11/2022 08:49 AM    CALCIUM 8.9 12/11/2022 08:49 AM    BILITOT <0.2 12/11/2022 08:49 AM    ALKPHOS 78 12/11/2022 08:49 AM    AST 21 12/11/2022 08:49 AM    ALT 11 12/11/2022 08:49 AM       DSM 5 DIAGNOSIS:  Bipolar affective disorder, most recent episode mixed, without psychotic features  Alcohol use disorder, severe, uncomplicated  Tobacco use disorder, severe  History of methamphetamine use disorder, in remission  History of cannabis use disorder, in remission  Treatment noncompliance  Suicidal ideations  Homelessness  Unemployment    Plan:   -Admit to [facility] adult Unit and monitor on 15 minute checks  Misbah you. -Gather collateral information from family with release  -Medical monitoring to be performed by Dr. Krysten Ag and associates  -Acclimate to the unit. -Encourage participation in groups and therapeutic activities as appropriate.  -Medications: Will restart patient's home medications at previously prescribed and recommended dose, due to patient's treatment noncompliance.   Patient will be provided with nicotine patch 21 mg / 24 hours for nicotine replacement.    -The risks, benefits, side effects, indications, contraindications, and adverse effects of the medications have been discussed.  -The patient has verbalized understanding and has capacity to give informed consent.  -SW help evaluating home environment.   -Discuss with treatment team.

## 2022-12-12 NOTE — PLAN OF CARE
Problem: Anxiety  Goal: Will report anxiety at manageable levels  Description: INTERVENTIONS:  1. Administer medication as ordered  2. Teach and rehearse alternative coping skills  3. Provide emotional support with 1:1 interaction with staff  Outcome: Progressing     Problem: Coping  Goal: Pt/Family able to verbalize concerns and demonstrate effective coping strategies  Description: INTERVENTIONS:  1. Assist patient/family to identify coping skills, available support systems and cultural and spiritual values  2. Provide emotional support, including active listening and acknowledgement of concerns of patient and caregivers  3. Reduce environmental stimuli, as able  4. Instruct patient/family in relaxation techniques, as appropriate  5. Assess for spiritual pain/suffering and initiate Spiritual Care, Psychosocial Clinical Specialist consults as needed  12/12/2022 1500 by Ho Culver RN  Outcome: Progressing  12/12/2022 1333 by Shashi hCilel  Outcome: Progressing  Note:                                                                     Group Therapy Note    Date: 12/12/2022  Start Time: 1100  End Time:  1130  Number of Participants: 12    Type of Group: Cognitive Skills    Wellness Binder Information  Module Name:  Women's Issues  Session Number:  4    Group Goal for Pt: To raise awareness of how thoughts influence feelings    Notes:  Pt demonstrated improved awareness of how thoughts influence feelings by actively participating in group discussion. Status After Intervention:  Unchanged    Participation Level:  Active Listener    Participation Quality: Appropriate      Speech:  normal      Thought Process/Content: Linear      Affective Functioning: Congruent      Mood: anxious and depressed      Level of consciousness:  Alert and Oriented x4      Response to Learning: Able to verbalize current knowledge/experience, Able to verbalize/acknowledge new learning, and Progressing to goal      Endings: None Reported    Modes of Intervention: Education      Discipline Responsible: Psychoeducational Specialist      Signature:  Thomas Carrero    12/12/2022 1330 by Thomas Carrero  Outcome: Progressing  Note:                                                                     Group Therapy Note    Date: 12/12/2022  Start Time: 1000  End Time:  1030  Number of Participants: 11    Type of Group: Psychoeducation    Wellness Binder Information  Module Name:  Men's Amrita  Session Number:  1    Group Goal for Pt: To improve knowledge of effective stress management techniques    Notes:  Pt demonstrated improved knowledge of effective stress management techniques by actively participating in group discussion. Status After Intervention:  Unchanged    Participation Level: Active Listener    Participation Quality: Appropriate      Speech:  normal      Thought Process/Content: Logical      Affective Functioning: Congruent      Mood: anxious and depressed      Level of consciousness:  Alert and Oriented x4      Response to Learning: Able to verbalize current knowledge/experience, Able to verbalize/acknowledge new learning, and Progressing to goal      Endings: None Reported    Modes of Intervention: Education      Discipline Responsible: Psychoeducational Specialist      Signature:  Thomas Carrero       Problem: Decision Making  Goal: Pt/Family able to effectively weigh alternatives and participate in decision making related to treatment and care  Description: INTERVENTIONS:  1. Determine when there are differences between patient's view, family's view, and healthcare provider's view of condition  2. Facilitate patient and family articulation of goals for care  3. Help patient and family identify pros/cons of alternative solutions  4. Provide information as requested by patient/family  5. Respect patient/family right to receive or not to receive information  6.  Serve as a liaison between patient and family and health care team  7. Initiate Consults from Ethics, Palliative Care or initiate [facility] as is appropriate  Outcome: Progressing     Problem: Behavior  Goal: Pt/Family maintain appropriate behavior and adhere to behavioral management agreement, if implemented  Description: INTERVENTIONS:  1. Assess patient/family's coping skills and  non-compliant behavior (including use of illegal substances)  2. Notify security of behavior or suspected illegal substances which indicate the need for search of the family and/or belongings  3. Encourage verbalization of thoughts and concerns in a socially appropriate manner  4. Utilize positive, consistent limit setting strategies supporting safety of patient, staff and others  5. Encourage participation in the decision making process about the behavioral management agreement  6. If a visitor's behavior poses a threat to safety call refer to organization policy. 7. Initiate consult with , Psychosocial CNS, Spiritual Care as appropriate  Outcome: Progressing     Problem: Depression/Self Harm  Goal: Effect of psychiatric condition will be minimized and patient will be protected from self harm  Description: INTERVENTIONS:  1. Assess impact of patient's symptoms on level of functioning, self care needs and offer support as indicated  2. Assess patient/family knowledge of depression, impact on illness and need for teaching  3. Provide emotional support, presence and reassurance  4. Assess for possible suicidal thoughts or ideation. If patient expresses suicidal thoughts or statements do not leave alone, initiate Suicide Precautions, move to a room close to the nursing station and obtain sitter  5.  Initiate consults as appropriate with Mental Health Professional, Spiritual Care, Psychosocial CNS, and consider a recommendation to the LIP for a Psychiatric Consultation  Outcome: Progressing     Problem: Self Harm/Suicidality  Goal: Will have no self-injury during hospital stay  Description: INTERVENTIONS:  1. Q 30 MINUTES: Routine safety checks  2. Q SHIFT & PRN: Assess risk to determine if routine checks are adequate to maintain patient safety  Outcome: Progressing

## 2022-12-12 NOTE — PROGRESS NOTES
Behavioral Services  Medicare Certification Upon Admission    I certify that this patient's inpatient psychiatric hospital admission is medically necessary for:    [x] (1) Treatment which could reasonably be expected to improve this patient's condition,       [x] (2) Or for diagnostic study;     AND     [x](2) The inpatient psychiatric services are provided while the individual is under the care of a physician and are included in the individualized plan of care.     Estimated length of stay/service 5-7 days    Plan for post-hospital care TBD    Electronically signed by Nelia Tavares MD on 12/12/2022 at 10:57 AM

## 2022-12-12 NOTE — PROGRESS NOTES
[facility] Adult Unit Daily Assessment  Nursing Progress Note    Room: [facility]/610-02   Name: Porfirio Zhou   Age: 29 y.o. Gender: male   Dx: Depression with suicidal ideation  Precautions: suicide risk  Inpatient Status: voluntary       SLEEP:  Sleep Quality Poor  Sleep Medications: No   PRN Sleep Meds: No       MEDICAL:  Other PRN Meds: No   Med Compliant: Yes  Accu-Chek: No  Oxygen/CPAP/BiPAP: No  CIWA/CINA: No   PAIN Assessment: none  Side Effects from medication: No      Metabolic Screening:  Lab Results   Component Value Date    LABA1C 4.7 02/07/2022     Lab Results   Component Value Date    CHOL 116 (L) 02/07/2022     Lab Results   Component Value Date    TRIG 98 02/07/2022     Lab Results   Component Value Date    HDL 52 (L) 02/07/2022     No components found for: LDLCAL  No results found for: LABVLDL  Body mass index is 18.83 kg/m². BP Readings from Last 2 Encounters:   12/12/22 120/85   09/09/22 116/70         Medical Bed:   Is patient in a medical bed? no   If medical bed is in use, has nursing secured room while patient is awake and out of the room? NA  Has safety checks by nursing been completed on the bed/room this shift? NA    Protective Factors:  Patient identifies protective factors with nursing staff as follows: Identifies reasons for living: Yes   Supportive Social Network or family: No    Belief that suicide is immoral/high spirituality: Yes   Responsibility to family or others/living with family: No   Fear of death or dying due to pain and suffering: Yes   Engaged in work or school: No     If Patient is unable to identify, reason why? PSYCH:  Depression: 10   Anxiety: 10   SI active and no plan but has visions of harming himself   Risk of Suicide: High Risk  HI Negative for homicidal ideation      AVH:no If Hallucinations are present, describe?          GENERAL:  Appetite: good   Percent Meals: 100%   Social: Yes   Speech: normal   Appearance: appropriately dressed, appropriately groomed, good hygiene, looks younger, and healthy looking    GROUP:  Group Participation: Yes  Participation Quality: Interactive    Notes: patient was in his room dressing after his shower. Patient seen later in the day area socializing with other patient and went to groups. Patient pleasant and cooperative when interviewed. Patient denied experiencing etoh withdraw symptoms. Patient states that his sleep a bit rough as he woke up a lot during the night. Patient states he only got 6 hours of broken sleep. Patient rated his depression as a 10 and his anxiety as a 10 as well. Patient denies HI and AVH. But says that he is positive for SI. Patient says that he does not have a plan he just has constant intrusive thoughts about him hurting himself. Patient was able to contract for safety. Will continue to monitor for safety.         Electronically signed by Sue Calderón RN on 12/12/22 at 2:47 PM CST

## 2022-12-12 NOTE — PSYCHOTHERAPY
Group Therapy Note    Date: 12/12/2022  Start Time: 1330  End Time:  1400  Number of Participants: 7    Type of Group: SPIRITUALITY     Wellness Binder Information  Module Name:  Mindfulness  Session Number:      Patient's Goal:  To rest the mind. .. Notes:      Status After Intervention:  Improved    Participation Level:  Active Listener and Interactive    Participation Quality: Appropriate, Attentive, Sharing, and Supportive      Speech:  normal      Thought Process/Content:       Affective Functioning: Congruent      Mood: calm      Level of consciousness:  Oriented x4      Response to Learning: Able to verbalize current knowledge/experience, Able to verbalize/acknowledge new learning, Able to retain information, and Capable of insight      Endings:     Modes of Intervention: Education, Support, and Activity      Discipline Responsible:       Signature:  Dewayne Holcomb MA [facility]

## 2022-12-12 NOTE — PROGRESS NOTES
[facility] Adult Unit Daily Assessment  Nursing Progress Note    Room: [facility]/610-02   Name: Ele Doe   Age: 29 y.o. Gender: male   Dx: Depression with suicidal ideation  Precautions: close watch and suicide risk  Inpatient Status: voluntary       SLEEP:  Sleep Quality  new to unit  Sleep Medications: No  PRN Sleep Meds: Yes Trazodone      MEDICAL:  Other PRN Meds: No   Med Compliant: Yes  Accu-Chek: No  Oxygen/CPAP/BiPAP: No  CIWA/CINA: No   PAIN Assessment: none  Side Effects from medication: No      Metabolic Screening:  Lab Results   Component Value Date    LABA1C 4.7 02/07/2022     Lab Results   Component Value Date    CHOL 116 (L) 02/07/2022     Lab Results   Component Value Date    TRIG 98 02/07/2022     Lab Results   Component Value Date    HDL 52 (L) 02/07/2022     No components found for: LDLCAL  No results found for: LABVLDL  Body mass index is 18.83 kg/m². BP Readings from Last 2 Encounters:   12/11/22 119/89   09/09/22 116/70         Medical Bed:   Is patient in a medical bed? no   If medical bed is in use, has nursing secured room while patient is awake and out of the room? NA  Has safety checks by nursing been completed on the bed/room this shift? NA    Protective Factors:  Patient identifies protective factors with nursing staff as follows: Identifies reasons for living: Yes   Supportive Social Network or family: No    Belief that suicide is immoral/high spirituality: Yes   Responsibility to family or others/living with family: No   Fear of death or dying due to pain and suffering: Yes   Engaged in work or school: No     If Patient is unable to identify, reason why? N/a      PSYCH:  Depression: 10   Anxiety: 10   SI passive   Risk of Suicide: High Risk  HI Negative for homicidal ideation      AVH:no If Hallucinations are present, describe?  N/a        GENERAL:  Appetite: no change from normal   Percent Meals: n/a   Social: No   Speech: normal   Appearance: appropriately dressed and healthy looking    GROUP:  Group Participation: No  Participation Quality: None    Notes:     PT seen in room for this encounter. PT was in bed covered up. PT sat up for assessment and interview had fair eye contact and appropriate.      Electronically signed by Marvin Roldan RN on 12/12/22 at 3:07 AM CST

## 2022-12-12 NOTE — PLAN OF CARE
Problem: Coping  Goal: Pt/Family able to verbalize concerns and demonstrate effective coping strategies  Description: INTERVENTIONS:  1. Assist patient/family to identify coping skills, available support systems and cultural and spiritual values  2. Provide emotional support, including active listening and acknowledgement of concerns of patient and caregivers  3. Reduce environmental stimuli, as able  4. Instruct patient/family in relaxation techniques, as appropriate  5. Assess for spiritual pain/suffering and initiate Spiritual Care, Psychosocial Clinical Specialist consults as needed  12/12/2022 1333 by Ashley Ortega  Outcome: Progressing  Note:                                                                     Group Therapy Note    Date: 12/12/2022  Start Time: 1100  End Time:  1130  Number of Participants: 12    Type of Group: Cognitive Skills    Wellness Binder Information  Module Name:  Women's Issues  Session Number:  4    Group Goal for Pt: To raise awareness of how thoughts influence feelings    Notes:  Pt demonstrated improved awareness of how thoughts influence feelings by actively participating in group discussion. Status After Intervention:  Unchanged    Participation Level:  Active Listener    Participation Quality: Appropriate      Speech:  normal      Thought Process/Content: Linear      Affective Functioning: Congruent      Mood: anxious and depressed      Level of consciousness:  Alert and Oriented x4      Response to Learning: Able to verbalize current knowledge/experience, Able to verbalize/acknowledge new learning, and Progressing to goal      Endings: None Reported    Modes of Intervention: Education      Discipline Responsible: Psychoeducational Specialist      Signature:  Ashley Ortega

## 2022-12-12 NOTE — PROGRESS NOTES
Admission Note      Reason for admission/Target Symptom: Per nursing admission assessment - Reason for Admission: Pt is a 29year old male who presented to the ED with suicidal ideations without a specific plan. Pt reported he has visualized hurting himself in many different wats, but has no actual suicidal attempts. Pt reports history of bipolar disorder, alcohol use disorder, canaabis use disorder, and methamphetamine use disorder. Reports he is currently homeless. Previous hospitalization at [facility] in September 2022. Denies HI and AVH. Diagnoses: depression, suicidal ideation  UDS: neg  BAL:  pos    SW will meet with treatment team to discuss patient's treatment including care planning, discharge planning, and follow-up needs. Patient has been admitted to [facility]. Treatment team will identify the patient's discharge needs. Appointments will be made for medication management and outpatient therapy/counseling. Pt confirmed the need for ongoing treatment post inpatient stay. Pt was also provided a handout of contact information for drug and alcohol treatment centers and other community support service such as [facility], AA, and [facility].

## 2022-12-13 LAB
ANION GAP SERPL CALCULATED.3IONS-SCNC: 6 MMOL/L (ref 7–19)
BUN BLDV-MCNC: 12 MG/DL (ref 6–20)
CALCIUM SERPL-MCNC: 9.1 MG/DL (ref 8.6–10)
CHLORIDE BLD-SCNC: 105 MMOL/L (ref 98–111)
CO2: 31 MMOL/L (ref 22–29)
CREAT SERPL-MCNC: 0.7 MG/DL (ref 0.5–1.2)
GFR SERPL CREATININE-BSD FRML MDRD: >60 ML/MIN/{1.73_M2}
GLUCOSE BLD-MCNC: 86 MG/DL (ref 74–109)
HBA1C MFR BLD: 4.8 % (ref 4–6)
MAGNESIUM: 2 MG/DL (ref 1.6–2.6)
POTASSIUM SERPL-SCNC: 4.5 MMOL/L (ref 3.5–5)
SODIUM BLD-SCNC: 142 MMOL/L (ref 136–145)
TSH REFLEX FT4: 3.12 UIU/ML (ref 0.35–5.5)
VITAMIN B-12: 387 PG/ML (ref 211–946)
VITAMIN D 25-HYDROXY: 16.4 NG/ML

## 2022-12-13 PROCEDURE — 6370000000 HC RX 637 (ALT 250 FOR IP): Performed by: PSYCHIATRY & NEUROLOGY

## 2022-12-13 PROCEDURE — 83735 ASSAY OF MAGNESIUM: CPT

## 2022-12-13 PROCEDURE — 84443 ASSAY THYROID STIM HORMONE: CPT

## 2022-12-13 PROCEDURE — 36415 COLL VENOUS BLD VENIPUNCTURE: CPT

## 2022-12-13 PROCEDURE — 82306 VITAMIN D 25 HYDROXY: CPT

## 2022-12-13 PROCEDURE — 1240000000 HC EMOTIONAL WELLNESS R&B

## 2022-12-13 PROCEDURE — 82607 VITAMIN B-12: CPT

## 2022-12-13 PROCEDURE — 83036 HEMOGLOBIN GLYCOSYLATED A1C: CPT

## 2022-12-13 PROCEDURE — 80048 BASIC METABOLIC PNL TOTAL CA: CPT

## 2022-12-13 RX ADMIN — CYANOCOBALAMIN TAB 500 MCG 500 MCG: 500 TAB at 08:39

## 2022-12-13 RX ADMIN — BUPROPION HYDROCHLORIDE 150 MG: 150 TABLET, EXTENDED RELEASE ORAL at 08:39

## 2022-12-13 RX ADMIN — DIVALPROEX SODIUM 750 MG: 500 TABLET, EXTENDED RELEASE ORAL at 08:39

## 2022-12-13 RX ADMIN — TRAZODONE HYDROCHLORIDE 50 MG: 50 TABLET ORAL at 20:32

## 2022-12-13 RX ADMIN — HYDROXYZINE HYDROCHLORIDE 25 MG: 25 TABLET, FILM COATED ORAL at 20:32

## 2022-12-13 NOTE — PLAN OF CARE
Problem: Anxiety  Goal: Will report anxiety at manageable levels  Description: INTERVENTIONS:  1. Administer medication as ordered  2. Teach and rehearse alternative coping skills  3. Provide emotional support with 1:1 interaction with staff  Outcome: Progressing     Problem: Coping  Goal: Pt/Family able to verbalize concerns and demonstrate effective coping strategies  Description: INTERVENTIONS:  1. Assist patient/family to identify coping skills, available support systems and cultural and spiritual values  2. Provide emotional support, including active listening and acknowledgement of concerns of patient and caregivers  3. Reduce environmental stimuli, as able  4. Instruct patient/family in relaxation techniques, as appropriate  5. Assess for spiritual pain/suffering and initiate Spiritual Care, Psychosocial Clinical Specialist consults as needed  Outcome: Progressing     Problem: Decision Making  Goal: Pt/Family able to effectively weigh alternatives and participate in decision making related to treatment and care  Description: INTERVENTIONS:  1. Determine when there are differences between patient's view, family's view, and healthcare provider's view of condition  2. Facilitate patient and family articulation of goals for care  3. Help patient and family identify pros/cons of alternative solutions  4. Provide information as requested by patient/family  5. Respect patient/family right to receive or not to receive information  6. Serve as a liaison between patient and family and health care team  7. Initiate Consults from Ethics, Palliative Care or initiate [address] as is appropriate  Outcome: Progressing     Problem: Behavior  Goal: Pt/Family maintain appropriate behavior and adhere to behavioral management agreement, if implemented  Description: INTERVENTIONS:  1. Assess patient/family's coping skills and  non-compliant behavior (including use of illegal substances)  2.  Notify security of behavior or suspected illegal substances which indicate the need for search of the family and/or belongings  3. Encourage verbalization of thoughts and concerns in a socially appropriate manner  4. Utilize positive, consistent limit setting strategies supporting safety of patient, staff and others  5. Encourage participation in the decision making process about the behavioral management agreement  6. If a visitor's behavior poses a threat to safety call refer to organization policy. 7. Initiate consult with , Psychosocial CNS, Spiritual Care as appropriate  Outcome: Progressing     Problem: Depression/Self Harm  Goal: Effect of psychiatric condition will be minimized and patient will be protected from self harm  Description: INTERVENTIONS:  1. Assess impact of patient's symptoms on level of functioning, self care needs and offer support as indicated  2. Assess patient/family knowledge of depression, impact on illness and need for teaching  3. Provide emotional support, presence and reassurance  4. Assess for possible suicidal thoughts or ideation. If patient expresses suicidal thoughts or statements do not leave alone, initiate Suicide Precautions, move to a room close to the nursing station and obtain sitter  5. Initiate consults as appropriate with Mental Health Professional, Spiritual Care, Psychosocial CNS, and consider a recommendation to the LIP for a Psychiatric Consultation  Outcome: Sameera Major (Taken 12/12/2022 2055 by Joanna Hatch RN)  Effect of psychiatric condition will be minimized and patient will be protected from self harm:   Assess impact of patients symptoms on level of functioning, self care needs and offer support as indicated   Assess patient/family knowledge of depression, impact on illness and need for teaching   Provide emotional support, presence and reassurance   Assess for suicidal thoughts or ideation.  If patient expresses suicidal thoughts or statements do not leave alone, initiate Suicide Precautions, move near nurse station, obtain sitter     Problem: Self Harm/Suicidality  Goal: Will have no self-injury during hospital stay  Description: INTERVENTIONS:  1. Q 30 MINUTES: Routine safety checks  2. Q SHIFT & PRN: Assess risk to determine if routine checks are adequate to maintain patient safety  Outcome: Progressing

## 2022-12-13 NOTE — PLAN OF CARE
Problem: Coping  Goal: Pt/Family able to verbalize concerns and demonstrate effective coping strategies  12/13/2022 1605 by Rafiq Hickey  Outcome: Sarita Myers (Taken 12/13/2022 1104 by Romana Cella, RN)  Patient/family able to verbalize anxieties, fears, and concerns, and demonstrate effective coping:   Assist patient/family to identify coping skills, available support systems and cultural and spiritual values   Provide emotional support, including active listening and acknowledgement of concerns of patient and caregivers   Reduce environmental stimuli, as able   Instruct patient/family in relaxation techniques, as appropriate      Group Therapy Note     Date: 12/13/2022  Start Time: 1530  End Time:  1500  Number of Participants: 7     Type of Group: Recovery     Wellness Binder Information  Module Name:  Emotional wellness  Session Number:  5     Patient's Goal:  obstacles to emotional wellness     Notes:  pt acknowledged negative thinking as an obstacle to emotional wellness.      Status After Intervention:  Improved     Participation Level: Interactive     Participation Quality: Appropriate, Attentive, and Sharing        Speech:  normal        Thought Process/Content: Logical        Affective Functioning: Congruent        Mood: congruent        Level of consciousness:  Alert, Oriented x4, and Attentive        Response to Learning: Able to verbalize current knowledge/experience        Endings: None Reported     Modes of Intervention: Education        Discipline Responsible: Psychoeducational Specialist        Signature:  Rafiq Hickey

## 2022-12-13 NOTE — H&P
HISTORY and PHYSICAL      CHIEF COMPLAINT:  Depression, SI    Reason for Admission:  Depression, SI    History Obtained From:  Patient, chart    HISTORY OF PRESENT ILLNESS:      The patient is a 29 y.o. male who is admitted to the [place] [address] unit with worsening mood issues. He has no c/o CP or SOA. He has no abdominal pain or N/V. He has no dysuria. He has no HA or dizziness. He has no fevers. Past Medical History:        Diagnosis Date    Alcohol abuse     Bipolar 1 disorder ([facility] [address]     Post traumatic stress disorder (PTSD)      Past Surgical History:        Procedure Laterality Date    OTHER SURGICAL HISTORY      cut tendon in right little finger         Medications Prior to Admission:    Medications Prior to Admission: buPROPion (WELLBUTRIN XL) 150 MG extended release tablet, Take 1 tablet by mouth daily (Patient not taking: Reported on 12/11/2022)  divalproex (DEPAKOTE ER) 250 MG extended release tablet, Take 3 tablets by mouth daily (Patient not taking: Reported on 12/11/2022)  traZODone (DESYREL) 50 MG tablet, Take 1 tablet by mouth nightly (Patient not taking: Reported on 12/11/2022)  Ergocalciferol (VITAMIN D) 10217 units CAPS, Take 50,000 Units by mouth once a week for 11 doses  vitamin B-12 500 MCG tablet, Take 1 tablet by mouth daily (Patient not taking: Reported on 12/11/2022)    Allergies:  Patient has no known allergies. Social History:   TOBACCO:   reports that he has been smoking. He has a 10.00 pack-year smoking history. He has never used smokeless tobacco.  ETOH:   reports that he does not currently use alcohol. DRUGS:   reports that he does not currently use drugs.   MARITAL STATUS:  single  OCCUPATION:  Not working  Patient currently is homeless      Family History:       Problem Relation Age of Onset    Other Father      REVIEW OF SYSTEMS:  Constitutional: neg  CV: neg  Pulmonary: neg  GI: neg  : neg  Psych: depression, SI  Neuro: neg  Skin: neg  MusculoSkeletal: neg  HEENT: neg  Joints: neg    Vitals:  /75   Pulse 86   Temp 97.7 °F (36.5 °C) (Temporal)   Resp 18   Ht 5' 11\" (1.803 m)   Wt 135 lb (61.2 kg)   SpO2 97%   BMI 18.83 kg/m²     PHYSICAL EXAM:  Gen: NAD, alert  HEENT: WNL  Lymph: no LAD  Neck: no JVD or masses  Chest: CTA bilat  CV: RRR  Abdomen: NT/ND  Extrem: no C/C/E  Neuro: non focal  Skin: no rashes  Joints: no redness    DATA:  I have reviewed the admission labs and imaging tests.     ASSESSMENT AND PLAN:      Principal Problem:    Bipolar affective disorder, currently depressed, moderate, SI---follow with Psych    Hypokalemia---recheck labs    Hyperglycemia---do Aye Esposito MD  5:02 AM 12/13/2022

## 2022-12-13 NOTE — PROGRESS NOTES
SW met with patient to complete psychosocial and lifetime CSSR-S on this date. Patients long and short-term goals discussed. Patient voiced understanding. Treatment plan sheet signed. Patient verbalized understanding of the treatment plan. Patient participated in goals and objectives of the treatment plan. Patient discussed safety plan with . SW explained treatment goals with pt. Decreasing depression and anxiety by improvement of positive coping patterns was discussed. Pt acknowledged understanding of treatment goals and signed treatment plan signature sheet. In the last 6 months has the patient been a danger to self: Yes  In the last 6 months has the patient been a danger to others: No  Legal Guardian/POA: No    Provided patient with [employer] handout entitled \"Quitting Smoking. \"  Reviewed handout with patient: addressing dangers of smoking, developing coping skills, and providing basic information about quitting. Patient received all components practical counseling of tobacco practical counseling during the hospital stay.

## 2022-12-13 NOTE — PROGRESS NOTES
[facility] Adult Unit Daily Assessment  Nursing Progress Note    Room: 06/610-02   Name: Sharee Alfonso   Age: 29 y.o. Gender: male   Dx: Depression with suicidal ideation  Precautions: close watch and suicide risk  Inpatient Status: voluntary       SLEEP:  Sleep Quality Fair  Sleep Medications: Yes  Trazodone   PRN Sleep Meds: No       MEDICAL:  Other PRN Meds: Yes Atarax  Med Compliant: Yes  Accu-Chek: No  Oxygen/CPAP/BiPAP: No  CIWA/CINA: No   PAIN Assessment: none or headaches  Side Effects from medication: No      Metabolic Screening:  Lab Results   Component Value Date    LABA1C 4.7 02/07/2022     Lab Results   Component Value Date    CHOL 116 (L) 02/07/2022     Lab Results   Component Value Date    TRIG 98 02/07/2022     Lab Results   Component Value Date    HDL 52 (L) 02/07/2022     No components found for: LDLCAL  No results found for: LABVLDL  Body mass index is 18.83 kg/m². BP Readings from Last 2 Encounters:   12/12/22 119/75   09/09/22 116/70         Medical Bed:   Is patient in a medical bed? no   If medical bed is in use, has nursing secured room while patient is awake and out of the room? NA  Has safety checks by nursing been completed on the bed/room this shift? NA    Protective Factors:  Patient identifies protective factors with nursing staff as follows: Identifies reasons for living: No   Supportive Social Network or family: Yes    Belief that suicide is immoral/high spirituality: Yes   Responsibility to family or others/living with family: No   Fear of death or dying due to pain and suffering: Yes   Engaged in work or school: No     If Patient is unable to identify, reason why? N/A      PSYCH:  Depression: 0   Anxiety: 6   SI passive   Risk of Suicide: No Risk  HI Negative for homicidal ideation      AVH:no If Hallucinations are present, describe?  N/a        GENERAL:  Appetite: no change from normal   Percent Meals: n/a   Social: Yes   Speech: normal   Appearance: appropriately dressed and healthy looking    GROUP:  Group Participation: Yes  Participation Quality: Active Listener and Interactive    Notes:     PT seen in room for this encounter. PT stood up for this encounter and participated willingly. PT has good eye contact and pleasant,  PT is minimally social with peers at this time. PT denies SI at this encounter and reports he had SI thoughts in the AM but has not had any SI thoughts this evening.      Electronically signed by Gus Tabares RN on 12/13/22 at 1:15 AM CST

## 2022-12-13 NOTE — PROGRESS NOTES
Treatment Team Note:     Target Symptoms/Reason for admission: Per nursing admission assessment - Reason for Admission: Pt is a 29year old male who presented to the ED with suicidal ideations without a specific plan. Pt reported he has visualized hurting himself in many different wats, but has no actual suicidal attempts. Pt reports history of bipolar disorder, alcohol use disorder, canaabis use disorder, and methamphetamine use disorder. Reports he is currently homeless. Previous hospitalization at [facility] in September 2022. Denies HI and AVH. Diagnoses per psych provider: Depression with suicidal ideation [F32. A, J77.323]  Acute alcoholic intoxication without complication ([facility] [address] [T44.312]     Therapist met with treatment team to discuss patients treatment and discharge plans. Patient's aftercare plan is: SW will meet with patient to gather information     Aftercare appointments made: No - SW will make discharge appointments     Pt lives with:  Pt is homeless     Collateral obtained from: SW will meet with patient to gather information  Collateral obtained on: Attending groups: No     Behavior: cooperative     Has patient been completing ADL's:  Yes     SI:  patient reports SI  Plan: no   If yes describe: N/A - patient denies plan  HI:  patient denies HI  If present describe: N/A  Delusions: patient denies delusions  If present describe: N/A  Hallucinations: patient denies hallucinations  If present describe: N/A     Patient rates their -- Depression: 1-10:  10  Anxiety:1-10:  10     Sleeping:New admission - unknown at this time.      Taking medication: Yes     Misc:

## 2022-12-13 NOTE — PROGRESS NOTES
[facility] Adult Unit Daily Assessment  Nursing Progress Note    Room: 0610/610-02   Name: Sherryle Boozer   Age: 29 y.o. Gender: male   Dx: Depression with suicidal ideation  Precautions: suicide risk  Inpatient Status: voluntary       SLEEP:  Sleep Quality Good  Sleep Medications: Yes   PRN Sleep Meds: No       MEDICAL:  Other PRN Meds: No   Med Compliant: Yes  Accu-Chek: No  Oxygen/CPAP/BiPAP: No  CIWA/CINA: No   PAIN Assessment: none  Side Effects from medication: No      Metabolic Screening:  Lab Results   Component Value Date    LABA1C 4.8 12/13/2022     Lab Results   Component Value Date    CHOL 116 (L) 02/07/2022     Lab Results   Component Value Date    TRIG 98 02/07/2022     Lab Results   Component Value Date    HDL 52 (L) 02/07/2022     No components found for: LDLCAL  No results found for: LABVLDL  Body mass index is 18.83 kg/m². BP Readings from Last 2 Encounters:   12/13/22 115/71   09/09/22 116/70         Medical Bed:   Is patient in a medical bed? no   If medical bed is in use, has nursing secured room while patient is awake and out of the room? Has safety checks by nursing been completed on the bed/room this shift? Protective Factors:  Patient identifies protective factors with nursing staff as follows: Identifies reasons for living: Yes   Supportive Social Network or family: No    Belief that suicide is immoral/high spirituality: Yes   Responsibility to family or others/living with family: No   Fear of death or dying due to pain and suffering: Yes   Engaged in work or school: No     If Patient is unable to identify, reason why? PSYCH:  Depression: 1-2   Anxiety: 2   SI denies suicidal ideation   Risk of Suicide: No Risk  HI Negative for homicidal ideation      AVH: no If Hallucinations are present, describe?          GENERAL:  Appetite: good   Percent Meals: %   Social: minimal   Speech: normal   Appearance: appropriately dressed and appropriately groomed    GROUP:  Group Participation: Yes  Participation Quality: Active Listener    Notes: Pt is alert and oriented x 4, calm and cooperative. Flat affect, mood congruent. Concentration and memory intact. Linear thought processes. Limited insight and judgment. Minimal socialization with peers and staff. Attends group. Med compliant. Rates depression 1-2, anxiety 2. Denies SI, HI, and AVH. Will continue to monitor pt.      Electronically signed by Cecelia Nguyen RN on 12/13/22 at 11:14 AM CST

## 2022-12-13 NOTE — PLAN OF CARE
Problem: Anxiety  Goal: Will report anxiety at manageable levels  12/13/2022 1143 by Chandrakant Poon  Outcome: [id number] Laura Guevara (Taken 12/13/2022 1104 by Catrina Long RN)  Will report anxiety at manageable levels:   Administer medication as ordered   Provide emotional support with 1:1 interaction with staff   Teach and rehearse alternative coping skills      Group Therapy Note     Date: 12/13/2022  Start Time: 1100  End Time:  1130  Number of Participants: 9     Type of Group: Psychoeducation     Wellness Binder Information  Module Name:  emotional wellness  Session Number:  1     Patient's Goal:  validation of feelings     Notes:  pt acknowledged to have feelings validated it may be necessary to share feelings with others.      Status After Intervention:  Improved     Participation Level: Interactive     Participation Quality: Appropriate, Attentive, and Sharing        Speech:  normal        Thought Process/Content: Logical        Affective Functioning: Congruent        Mood:  congruent        Level of consciousness:  Alert, Oriented x4, and Attentive        Response to Learning: Able to verbalize current knowledge/experience        Endings: None Reported     Modes of Intervention: Education        Discipline Responsible: Psychoeducational Specialist        Signature:  Chandrakant Poon

## 2022-12-13 NOTE — PROGRESS NOTES
Department of Psychiatry  Attending Progress Note      SUBJECTIVE:    29 y.o. male with previous psychiatric history of bipolar disorder, alcohol use disorder, methamphetamine use disorder, cannabis use disorder, history of treatment noncompliance, who has been admitted to our psychiatric unit, secondary to treatment noncompliance and suicidal ideations. Patient has been seen in treatment team room. He reported that his condition mildly improved since time of admission to the hospital, stated that his mood is \"better\" today. He endorses good appetite and good quality of sleep, stated that he did not experience any difficulties to fall asleep or stay asleep during the last night and woke up rested well on the morning. He is compliant with currently prescribed medications and denies any side effects. Patient continues to report feeling of anxiety and rated level of his anxiety today as 4 out of 10, with 10 being the worst.  He attends group activities in the unit and participates in those activities. Patient is minimally social with other patients in the unit and social with medical staff only when it is related to his treatment plan. He performed his ADLs today and took a shower. Patient denies current active suicidal ideation, endorses intermittent suicidal thoughts during the last night. He denies any homicidal ideations or suicidal plans. Patient denies auditory and visual hallucinations. He did not endorse any delusions or paranoid thoughts. OBJECTIVE    Physical  VITALS:  /71   Pulse 81   Temp 97.5 °F (36.4 °C) (Temporal)   Resp 16   Ht 5' 11\" (1.803 m)   Wt 135 lb (61.2 kg)   SpO2 97%   BMI 18.83 kg/m²   TEMPERATURE:  Current - Temp: 97.5 °F (36.4 °C);  Max - Temp  Av.6 °F (36.4 °C)  Min: 97.5 °F (36.4 °C)  Max: 97.7 °F (36.5 °C)  RESPIRATIONS RANGE: Resp  Av  Min: 16  Max: 18  PULSE RANGE: Pulse  Av.5  Min: 81  Max: 86  BLOOD PRESSURE RANGE:  Systolic (97MMV), TransDERmal, Daily  traZODone (DESYREL) tablet 50 mg, 50 mg, Oral, Nightly    ASSESSMENT AND PLAN    DSM 5 DIAGNOSIS:  Bipolar affective disorder, most recent episode mixed, without psychotic features  Alcohol use disorder, severe, uncomplicated  Tobacco use disorder, severe  History of methamphetamine use disorder, in remission  History of cannabis use disorder, in remission  Treatment noncompliance  Suicidal ideations  Homelessness  Unemployment  Vitamin D deficiency  Vitamin B12 deficiency    Plan:   1. Psychiatric Medications:   Continue current psychotropic medications as recommended. Patient denies side effect of currently prescribed medications. No changes to the dose of prescribed psychotropic medications today. 2. Medical Issues:    Continue medical monitoring by Dr. Sabi Bell and associates. Will start patient on vitamin D 50,000 units weekly for vitamin D deficiency  Will start patient on vitamin B12 500 MCG daily for vitamin B12 deficiency    3. Disposition:    Discharge patient home when he will be in stable mental condition and adjustment psychotropic medications will be done.      Amount of time spent with patient:    35 minutes with greater than 50% of the time spent in counseling and collaboration of care

## 2022-12-13 NOTE — GROUP NOTE
Group Therapy Note    Date: 12/12/2022    Group Start Time: 1600  Group End Time: 1700  Group Topic: Healthy Living/Wellness    [facility] 6 ADULT I    Elle Valente RN              Patient's Goal:  Safety Plan     Status After Intervention:  Improved    Participation Level: Active Listener    Participation Quality: Appropriate      Speech:  normal      Thought Process/Content: Logical  Linear      Affective Functioning: Congruent      Mood: anxious      Level of consciousness:  Alert and Oriented x4      Response to Learning: Able to retain information      Endings: None Reported    Modes of Intervention: Education and Support      Discipline Responsible: Registered Nurse      Signature:   Elle Valente RN

## 2022-12-13 NOTE — GROUP NOTE
Group Therapy Note    Date: 12/13/2022    Group Start Time: 1700  Group End Time: 7592  Group Topic: Healthy Living/Wellness    [facility] ADULT I    Lynne Back RN              Patient's Goal:  Medication Education         Status After Intervention:  Unchanged    Participation Level: Minimal    Participation Quality: Appropriate      Speech:  normal      Thought Process/Content: Logical      Affective Functioning: Congruent      Mood: anxious      Level of consciousness:  Alert and Oriented x4      Response to Learning: Able to verbalize current knowledge/experience and Able to verbalize/acknowledge new learning      Endings: None Reported    Modes of Intervention: Education and Support      Discipline Responsible: Registered Nurse      Signature:   Lynne Back RN

## 2022-12-13 NOTE — PROGRESS NOTES
Group Note    Date: 12/12/22  Start Time: 8:00 PM   End Time:8:30 PM     Number of Participants: 11    Type of Group: Community/Goal     Patient's Goal:      Notes:      Status After Intervention:  Unchanged    Participation Level: Interactive    Participation Quality: Attentive    Speech:  normal    Thought Process/Content: Logical    Mood:       Level of consciousness:  Alert    Response to Learning: Progressing to goal    Modes of Intervention: Education and Support    Discipline Responsible: Behavioral Health Technician     Signature:  Tho Navarro

## 2022-12-14 PROCEDURE — 6370000000 HC RX 637 (ALT 250 FOR IP): Performed by: PSYCHIATRY & NEUROLOGY

## 2022-12-14 PROCEDURE — 1240000000 HC EMOTIONAL WELLNESS R&B

## 2022-12-14 RX ADMIN — HYDROXYZINE HYDROCHLORIDE 25 MG: 25 TABLET, FILM COATED ORAL at 20:33

## 2022-12-14 RX ADMIN — DIVALPROEX SODIUM 750 MG: 500 TABLET, EXTENDED RELEASE ORAL at 08:31

## 2022-12-14 RX ADMIN — TRAZODONE HYDROCHLORIDE 50 MG: 50 TABLET ORAL at 20:33

## 2022-12-14 RX ADMIN — CYANOCOBALAMIN TAB 500 MCG 500 MCG: 500 TAB at 08:31

## 2022-12-14 RX ADMIN — BUPROPION HYDROCHLORIDE 150 MG: 150 TABLET, EXTENDED RELEASE ORAL at 09:30

## 2022-12-14 NOTE — PROGRESS NOTES
Group Note    Date: 12/14/22  Start Time: 8:00 AM   End Time:8:30 AM     Number of Participants: 11    Type of Group: Community/Goal     Patient's Goal:  improving    Notes:      Status After Intervention:      Participation Level:  Active Listener    Participation Quality: Appropriate    Speech:  normal    Thought Process/Content: Logical    Mood:  calm    Level of consciousness:  Alert    Response to Learning: Able to verbalize current knowledge/experience    Modes of Intervention: Education and Support    Discipline Responsible: Behavioral Health Technician     Signature:  Dennis Mason

## 2022-12-14 NOTE — PLAN OF CARE
Problem: Coping  Goal: Pt/Family able to verbalize concerns and demonstrate effective coping strategies  12/14/2022 1612 by Hong Jorge  Outcome: Progressing    Group Therapy Note     Date: 12/14/2022  Start Time: 4238  End Time:  1600  Number of Participants: 7     Type of Group: Recovery     Wellness Binder Information  Module Name:  emotional wellness  Session Number:  5     Patient's Goal:  obstacles to emotional wellness     Notes:  pt acknowledged negative thinking and an obstacle to emotional wellness.      Status After Intervention:  Improved     Participation Level: Interactive     Participation Quality: Appropriate, Attentive, and Sharing        Speech:  normal        Thought Process/Content: Logical        Affective Functioning: Congruent        Mood: congruent        Level of consciousness:  Alert, Oriented x4, and Attentive        Response to Learning: Able to verbalize current knowledge/experience        Endings: None Reported     Modes of Intervention: Education        Discipline Responsible: Psychoeducational Specialist        Signature:  Hong Jorge

## 2022-12-14 NOTE — PROGRESS NOTES
Progress Note  Negar Avitia  12/13/2022 11:49 PM  Subjective:   Admit Date:   12/11/2022      CC/ADMIT DX:       Interval History:   Reviewed overnight events and nursing notes. He has no new medical issues. I have reviewed all labs/diagnostics from the last 24hrs. ROS:   I have done a 10 point ROS and all are negative, except what is mentioned in the HPI. ADULT DIET; Regular    Medications:      buPROPion  150 mg Oral Daily    divalproex  750 mg Oral Daily    cyanocobalamin  500 mcg Oral Daily    vitamin D  50,000 Units Oral Weekly    nicotine  1 patch TransDERmal Daily    traZODone  50 mg Oral Nightly           Objective:   Vitals: /74   Pulse 84   Temp 97.9 °F (36.6 °C) (Temporal)   Resp 16   Ht 5' 11\" (1.803 m)   Wt 135 lb (61.2 kg)   SpO2 98%   BMI 18.83 kg/m²  No intake or output data in the 24 hours ending 12/13/22 2349  General appearance: alert and cooperative with exam  Extremities: extremities normal, atraumatic, no cyanosis or edema  Neurologic:  No obvious focal neurologic deficits. Skin: no rashes    Assessment and Plan:   Principal Problem:    Depression with suicidal ideation  Active Problems:    Bipolar affective disorder, current episode mixed, without psychotic features ([address]  Resolved Problems:    * No resolved hospital problems. *    Vit D Def    Plan:   Continue present medication(s)    Replace Vit D and B12   Follow with Psych      Discharge planning:    home     Reviewed treatment plans with the patient and/or family.              Electronically signed by Marti Mejia MD on 12/13/2022 at 11:49 PM

## 2022-12-14 NOTE — PROGRESS NOTES
Department of Psychiatry  Attending Progress Note      SUBJECTIVE:    29 y.o. male with previous psychiatric history of bipolar disorder, alcohol use disorder, methamphetamine use disorder, cannabis use disorder, history of treatment noncompliance, who has been admitted to our psychiatric unit, secondary to treatment noncompliance and suicidal ideations. Patient has been seen in treatment team room. He reported that his condition significantly improved during this hospital stay and his mood is \"good\" today. He endorses good appetite and good quality of sleep during the last night, stated that he slept 8 hours and woke up rested well in the morning. Patient is compliant with currently prescribed medications and denies any side effects. He denies any affective symptomatology today, denies any depression, anxiety or psychotic symptoms. Patient attends group activities in the unit and participates in some of those activities. He became slightly more social with medical staff and other patients in the unit. Patient performed his ADLs today and took a shower. He denies current active suicidal or homicidal ideations, denies any plans. Also, he denies auditory and visual hallucinations. OBJECTIVE    Physical  VITALS:  BP 98/60   Pulse 88   Temp 97.7 °F (36.5 °C) (Temporal)   Resp 18   Ht 5' 11\" (1.803 m)   Wt 135 lb (61.2 kg)   SpO2 99%   BMI 18.83 kg/m²   TEMPERATURE:  Current - Temp: 97.7 °F (36.5 °C);  Max - Temp  Av.8 °F (36.6 °C)  Min: 97.7 °F (36.5 °C)  Max: 97.9 °F (36.6 °C)  RESPIRATIONS RANGE: Resp  Av  Min: 16  Max: 18  PULSE RANGE: Pulse  Av  Min: 84  Max: 88  BLOOD PRESSURE RANGE:  Systolic (20QJP), QIQ:532 , Min:98 , NIW:606  ; Diastolic (86DYU), LMJ:76, Min:60, Max:74   PULSE OXIMETRY RANGE: SpO2  Av.5 %  Min: 98 %  Max: 99 %    Review of Systems: 14 point review of systems is negative    Psychological ROS: Negative    Mental Status Examination:    Appearance: Appropriately groomed, wearing casual civilian clothes. Made good eye contact. Behavior: Calm, cooperative and socially appropriate. No psychomotor retardation/agitation appreciated. Gait unremarkable. Speech: Normal in tone, volume, and quality. Mood: \"good\"   Affect: Mood congruent. Range is full  Thought Process: Appears linear and goal oriented. Thought Content: Patient does not have any current active suicidal and homicidal ideations. No overt delusions or paranoia appreciated. Perceptions: Seems patient does not have any auditory or visual hallucinations at present time. Patient did not appear to be responding to internal stimuli. Orientation: to person, place, date, and situation. Alert.    Impulsivity: Improved  Neurovegitative: Good appetite, good sleep  Insight: Limited  Judgment: Limited       Data  No new lab tests results to review    Medications  Current Facility-Administered Medications: buPROPion (WELLBUTRIN XL) extended release tablet 150 mg, 150 mg, Oral, Daily  divalproex (DEPAKOTE ER) extended release tablet 750 mg, 750 mg, Oral, Daily  vitamin B-12 (CYANOCOBALAMIN) tablet 500 mcg, 500 mcg, Oral, Daily  vitamin D (ERGOCALCIFEROL) capsule 50,000 Units, 50,000 Units, Oral, Weekly  hydrOXYzine HCl (ATARAX) tablet 25 mg, 25 mg, Oral, TID PRN  acetaminophen (TYLENOL) tablet 650 mg, 650 mg, Oral, Q4H PRN  polyethylene glycol (GLYCOLAX) packet 17 g, 17 g, Oral, Daily PRN  nicotine (NICODERM CQ) 21 MG/24HR 1 patch, 1 patch, TransDERmal, Daily  traZODone (DESYREL) tablet 50 mg, 50 mg, Oral, Nightly    ASSESSMENT AND PLAN    DSM 5 DIAGNOSIS:  Bipolar affective disorder, most recent episode mixed, without psychotic features  Alcohol use disorder, severe, uncomplicated  Tobacco use disorder, severe  History of methamphetamine use disorder, in remission  History of cannabis use disorder, in remission  Treatment noncompliance  Suicidal ideations  Homelessness  Unemployment  Vitamin D deficiency  Vitamin B12 deficiency     Plan:   1. Psychiatric Medications:   Continue current psychotropic medications as recommended. Patient denies side effect of currently prescribed medications. No changes to the dose of prescribed psychotropic medications today. 2. Medical Issues:    Continue medical monitoring by Dr. Iron Alicea and associates. 3. Disposition:    Discharge patient home when he will be in stable mental condition and adjustment psychotropic medications will be done.   Preliminary day of discharge is tomorrow on 12/15/2022     Amount of time spent with patient:    35 minutes with greater than 50% of the time spent in counseling and collaboration of care

## 2022-12-14 NOTE — PSYCHOTHERAPY
Group Therapy Note    Date: 12/14/2022  Start Time: 1330  End Time:  1400  Number of Participants: 10    Type of Group: SPIRITUALITY     Wellness Binder Information  Module Name:  Mindfulness  Session Number:      Patient's Goal:  To rest the mind. .. Notes:      Status After Intervention:  Improved    Participation Level:  Active Listener and Interactive    Participation Quality: Appropriate, Attentive, and Sharing      Speech:  normal      Thought Process/Content:       Affective Functioning: Congruent      Mood: calm      Level of consciousness:  Oriented x4      Response to Learning: Able to verbalize current knowledge/experience, Able to verbalize/acknowledge new learning, and Capable of insight      Endings:     Modes of Intervention: Education, Support, and Activity      Discipline Responsible:       Signature:  Erica Corado  [place]

## 2022-12-14 NOTE — PROGRESS NOTES
Treatment Team Note:    Target Symptoms/Reason for admission: Per nursing admission assessment - Reason for Admission: Pt is a 29year old male who presented to the ED with suicidal ideations without a specific plan. Pt reported he has visualized hurting himself in many different wats, but has no actual suicidal attempts. Pt reports history of bipolar disorder, alcohol use disorder, canaabis use disorder, and methamphetamine use disorder. Reports he is currently homeless. Previous hospitalization at [facility] in September 2022. Denies HI and AVH. Diagnoses per psych provider: Depression with suicidal ideation [F32. A, F02.758]  Acute alcoholic intoxication without complication ([facility] [K36.803]  Bipolar affective disorder, current episode mixed, without psychotic features ([facility] [F31.60]    Therapist met with treatment team to discuss patients treatment and discharge plans. Patient's aftercare plan is: SW will meet with patient to gather information    Aftercare appointments made: No - SW will make discharge appointments    Pt lives with:  Pt is homeless    Collateral obtained from: SW will meet with patient to gather information  Collateral obtained on: Attending groups: Yes    Behavior: cooperative    Has patient been completing ADL's:  Yes    SI:  patient denies SI  Plan: no   If yes describe: N/A - patient denies plan  HI:  patient denies HI  If present describe: N/A  Delusions: patient denies delusions  If present describe: N/A  Hallucinations: patient denies hallucinations  If present describe: N/A    Patient rates their -- Depression: 1-10:  0  Anxiety:1-10:  2    Sleeping: Fair    Taking medication: Yes    Misc: Tentative discharge Thursday.

## 2022-12-14 NOTE — PLAN OF CARE
Problem: Anxiety  Goal: Will report anxiety at manageable levels  Description: INTERVENTIONS:  1. Administer medication as ordered  2. Teach and rehearse alternative coping skills  3. Provide emotional support with 1:1 interaction with staff  Outcome: Progressing     Problem: Coping  Goal: Pt/Family able to verbalize concerns and demonstrate effective coping strategies  Description: INTERVENTIONS:  1. Assist patient/family to identify coping skills, available support systems and cultural and spiritual values  2. Provide emotional support, including active listening and acknowledgement of concerns of patient and caregivers  3. Reduce environmental stimuli, as able  4. Instruct patient/family in relaxation techniques, as appropriate  5. Assess for spiritual pain/suffering and initiate Spiritual Care, Psychosocial Clinical Specialist consults as needed  Outcome: Progressing     Problem: Decision Making  Goal: Pt/Family able to effectively weigh alternatives and participate in decision making related to treatment and care  Description: INTERVENTIONS:  1. Determine when there are differences between patient's view, family's view, and healthcare provider's view of condition  2. Facilitate patient and family articulation of goals for care  3. Help patient and family identify pros/cons of alternative solutions  4. Provide information as requested by patient/family  5. Respect patient/family right to receive or not to receive information  6. Serve as a liaison between patient and family and health care team  7. Initiate Consults from Ethics, Palliative Care or initiate [address] as is appropriate  Outcome: Progressing     Problem: Behavior  Goal: Pt/Family maintain appropriate behavior and adhere to behavioral management agreement, if implemented  Description: INTERVENTIONS:  1. Assess patient/family's coping skills and  non-compliant behavior (including use of illegal substances)  2.  Notify security of behavior or suspected illegal substances which indicate the need for search of the family and/or belongings  3. Encourage verbalization of thoughts and concerns in a socially appropriate manner  4. Utilize positive, consistent limit setting strategies supporting safety of patient, staff and others  5. Encourage participation in the decision making process about the behavioral management agreement  6. If a visitor's behavior poses a threat to safety call refer to organization policy. 7. Initiate consult with , Psychosocial CNS, Spiritual Care as appropriate  Outcome: Progressing     Problem: Depression/Self Harm  Goal: Effect of psychiatric condition will be minimized and patient will be protected from self harm  Description: INTERVENTIONS:  1. Assess impact of patient's symptoms on level of functioning, self care needs and offer support as indicated  2. Assess patient/family knowledge of depression, impact on illness and need for teaching  3. Provide emotional support, presence and reassurance  4. Assess for possible suicidal thoughts or ideation. If patient expresses suicidal thoughts or statements do not leave alone, initiate Suicide Precautions, move to a room close to the nursing station and obtain sitter  5.  Initiate consults as appropriate with Mental Health Professional, Spiritual Care, Psychosocial CNS, and consider a recommendation to the LIP for a Psychiatric Consultation  Outcome: Progressing     Problem: Self Harm/Suicidality  Goal: Will have no self-injury during hospital stay  Description: INTERVENTIONS:  1. Q 30 MINUTES: Routine safety checks  2. Q SHIFT & PRN: Assess risk to determine if routine checks are adequate to maintain patient safety  Outcome: Progressing

## 2022-12-14 NOTE — PLAN OF CARE
Problem: Coping  Goal: Pt/Family able to verbalize concerns and demonstrate effective coping strategies  Description: INTERVENTIONS:  1. Assist patient/family to identify coping skills, available support systems and cultural and spiritual values  2. Provide emotional support, including active listening and acknowledgement of concerns of patient and caregivers  3. Reduce environmental stimuli, as able  4. Instruct patient/family in relaxation techniques, as appropriate  5. Assess for spiritual pain/suffering and initiate Spiritual Care, Psychosocial Clinical Specialist consults as needed  12/14/2022 1141 by Poornima Montero  Outcome: Progressing  Flowsheets (Taken 12/14/2022 1122 by Wesley Graf RN)  Patient/family able to verbalize anxieties, fears, and concerns, and demonstrate effective coping:   Assist patient/family to identify coping skills, available support systems and cultural and spiritual values   Provide emotional support, including active listening and acknowledgement of concerns of patient and caregivers   Reduce environmental stimuli, as able   Instruct patient/family in relaxation techniques, as appropriate  Note:                                                                     Group Therapy Note    Date: 12/14/2022  Start Time: 1000  End Time:  1030  Number of Participants: 14    Type of Group: Psychoeducation    Wellness Binder Information  Module Name:  [address] [place] [address]  Session Number:  1    Group Goal for Pt: To improve knowledge of practical facts about depression    Notes:  Pt demonstrated improved knowledge of practical facts about depression by actively participating in group activity. Status After Intervention:  Unchanged    Participation Level:  Active Listener and Interactive    Participation Quality: Appropriate and Attentive      Speech:  normal      Thought Process/Content: Logical      Affective Functioning: Congruent      Mood: anxious and depressed      Level of consciousness:  Alert and Oriented x4      Response to Learning: Able to verbalize current knowledge/experience, Able to verbalize/acknowledge new learning, and Progressing to goal      Endings: None Reported    Modes of Intervention: Education      Discipline Responsible: Psychoeducational Specialist      Signature:  Jaime Isbell

## 2022-12-14 NOTE — PROGRESS NOTES
Group Note    Date: 12/13/22  Start Time: 8:00 PM   End Time:8:30 PM     Number of Participants: 10    Type of Group: Community/Goal     Patient's Goal:      Notes:      Status After Intervention:  Unchanged    Participation Level: Interactive    Participation Quality: Attentive    Speech:  normal    Thought Process/Content: Logical    Mood:  appropriate for group    Level of consciousness:  Alert    Response to Learning: Progressing to goal    Modes of Intervention: Education and Support    Discipline Responsible: Behavioral Health Technician     Signature:  Elise Lennox

## 2022-12-14 NOTE — PLAN OF CARE
Problem: Anxiety  Goal: Will report anxiety at manageable levels  12/14/2022 1157 by Lovely Leyden  Outcome: Progressing  Flowsheets (Taken 12/14/2022 1122 by Avis Turk RN)  Will report anxiety at manageable levels:   Administer medication as ordered   Provide emotional support with 1:1 interaction with staff   Group Therapy Note     Date: 12/14/2022  Start Time: 1100  End Time:  1130  Number of Participants: 10     Type of Group: Psychotherapy     Wellness Binder Information  Module Name:  staying well  Session Number:  1     Patient's Goal:  daily maintenance and copingt skills     Notes:  pt acknowledged use of positive coping skills daily to help stay well.      Status After Intervention:  Improved     Participation Level: Interactive     Participation Quality: Appropriate, Attentive, and Sharing        Speech:  normal        Thought Process/Content: Logical        Affective Functioning: Congruent        Mood: angry        Level of consciousness:  Alert, Oriented x4, and Attentive        Response to Learning: Able to verbalize current knowledge/experience        Endings: None Reported     Modes of Intervention: Education        Discipline Responsible: Psychoeducational Specialist        Signature:  Lovely Leyden                  Teach and rehearse alternative coping skills

## 2022-12-14 NOTE — PROGRESS NOTES
[facility] Adult Unit Daily Assessment  Nursing Progress Note    [facility]   Name: Francisca Tejeda   Age: 29 y.o. Gender: male   Dx: Depression with suicidal ideation  Precautions: suicide risk  Inpatient Status: voluntary       SLEEP:  Sleep Quality Fair  Sleep Medications: Yes   PRN Sleep Meds: No       MEDICAL:  Other PRN Meds: Yes   Med Compliant: Yes  Accu-Chek: No  Oxygen/CPAP/BiPAP: No  CIWA/CINA: No   PAIN Assessment: none  Side Effects from medication: No      Metabolic Screening:  Lab Results   Component Value Date    LABA1C 4.8 12/13/2022     Lab Results   Component Value Date    CHOL 116 (L) 02/07/2022     Lab Results   Component Value Date    TRIG 98 02/07/2022     Lab Results   Component Value Date    HDL 52 (L) 02/07/2022     No components found for: LDLCAL  No results found for: LABVLDL  Body mass index is 18.83 kg/m². BP Readings from Last 2 Encounters:   12/13/22 124/74   09/09/22 116/70         Medical Bed:   Is patient in a medical bed? no   If medical bed is in use, has nursing secured room while patient is awake and out of the room? NA  Has safety checks by nursing been completed on the bed/room this shift? NA    Protective Factors:  Patient identifies protective factors with nursing staff as follows: Identifies reasons for living: Yes   Supportive Social Network or family: No   Belief that suicide is immoral/high spirituality: Yes   Responsibility to family or others/living with family: No   Fear of death or dying due to pain and suffering: Yes   Engaged in work or school: No     If Patient is unable to identify, reason why? PSYCH:  Depression: 0   Anxiety: 2   SI denies suicidal ideation   Risk of Suicide: No Risk  HI Negative for homicidal ideation      AVH:no If Hallucinations are present, describe?          GENERAL:  Appetite: no change from normal   Percent Meals:    Social: No   Speech: normal   Appearance: appropriately dressed    GROUP:  Group Participation: Yes  Participation Quality: Interactive    Notes:   Patient has been in bed the majority of this shift this evening. He reports trouble falling asleep last night. He reports no depression and minimal anxiety this evening.        Electronically signed by Cari Chowdhury RN on 12/14/22 at 12:05 AM CST

## 2022-12-14 NOTE — PROGRESS NOTES
[facility] Adult Unit Daily Assessment  Nursing Progress Note    Room: [facility]/607-02   Name: Sharee Alfonso   Age: 29 y.o. Gender: male   Dx: Depression with suicidal ideation  Precautions: suicide risk  Inpatient Status: voluntary       SLEEP:  Sleep Quality Good  Sleep Medications: Yes   PRN Sleep Meds: No       MEDICAL:  Other PRN Meds: No   Med Compliant: Yes  Accu-Chek: No  Oxygen/CPAP/BiPAP: No  CIWA/CINA: No   PAIN Assessment: none  Side Effects from medication: No      Metabolic Screening:  Lab Results   Component Value Date    LABA1C 4.8 12/13/2022     Lab Results   Component Value Date    CHOL 116 (L) 02/07/2022     Lab Results   Component Value Date    TRIG 98 02/07/2022     Lab Results   Component Value Date    HDL 52 (L) 02/07/2022     No components found for: LDLCAL  No results found for: LABVLDL  Body mass index is 18.83 kg/m². BP Readings from Last 2 Encounters:   12/14/22 98/60   09/09/22 116/70         Medical Bed:   Is patient in a medical bed? no   If medical bed is in use, has nursing secured room while patient is awake and out of the room? Has safety checks by nursing been completed on the bed/room this shift? Protective Factors:  Patient identifies protective factors with nursing staff as follows: Identifies reasons for living: Yes   Supportive Social Network or family: No    Belief that suicide is immoral/high spirituality: Yes   Responsibility to family or others/living with family: No   Fear of death or dying due to pain and suffering: Yes   Engaged in work or school: No     If Patient is unable to identify, reason why? PSYCH:  Depression: 1   Anxiety: 1   SI denies suicidal ideation   Risk of Suicide: No Risk  HI Negative for homicidal ideation      AVH: no If Hallucinations are present, describe?          GENERAL:  Appetite: good   Percent Meals: %   Social: Minimal   Speech: normal   Appearance: appropriately dressed and appropriately groomed    GROUP:  Group Participation: Yes  Participation Quality: Active Listener    Notes: Pt is alert and oriented x 4, calm and cooperative. Flat affect, mood congruent. Concentration and memory intact. Linear thought processes. Limited insight and judgment. Minimal socialization with peers and staff. Attends group. Med compliant. Rates depression and anxiety 1. Denies SI, HI, and AVH. Will continue to monitor pt.       Electronically signed by Avis Turk RN on 12/14/22 at 11:35 AM CST

## 2022-12-15 VITALS
WEIGHT: 135 LBS | RESPIRATION RATE: 16 BRPM | DIASTOLIC BLOOD PRESSURE: 70 MMHG | HEART RATE: 87 BPM | OXYGEN SATURATION: 97 % | SYSTOLIC BLOOD PRESSURE: 118 MMHG | TEMPERATURE: 97.3 F | HEIGHT: 71 IN | BODY MASS INDEX: 18.9 KG/M2

## 2022-12-15 PROCEDURE — 5130000000 HC BRIDGE APPOINTMENT

## 2022-12-15 PROCEDURE — 6370000000 HC RX 637 (ALT 250 FOR IP): Performed by: PSYCHIATRY & NEUROLOGY

## 2022-12-15 RX ORDER — ERGOCALCIFEROL 1.25 MG/1
50000 CAPSULE ORAL WEEKLY
Qty: 11 CAPSULE | Refills: 0 | Status: SHIPPED | OUTPATIENT
Start: 2022-12-15 | End: 2023-02-24

## 2022-12-15 RX ORDER — DIVALPROEX SODIUM 250 MG/1
250 TABLET, EXTENDED RELEASE ORAL DAILY
Qty: 30 TABLET | Refills: 1 | Status: SHIPPED | OUTPATIENT
Start: 2022-12-15

## 2022-12-15 RX ORDER — HYDROXYZINE HYDROCHLORIDE 25 MG/1
25 TABLET, FILM COATED ORAL 3 TIMES DAILY PRN
Qty: 90 TABLET | Refills: 1 | Status: SHIPPED | OUTPATIENT
Start: 2022-12-15

## 2022-12-15 RX ORDER — TRAZODONE HYDROCHLORIDE 50 MG/1
50 TABLET ORAL NIGHTLY
Qty: 30 TABLET | Refills: 1 | Status: SHIPPED | OUTPATIENT
Start: 2022-12-15

## 2022-12-15 RX ORDER — DIVALPROEX SODIUM 500 MG/1
500 TABLET, EXTENDED RELEASE ORAL DAILY
Qty: 30 TABLET | Refills: 1 | Status: SHIPPED | OUTPATIENT
Start: 2022-12-15

## 2022-12-15 RX ORDER — BUPROPION HYDROCHLORIDE 150 MG/1
150 TABLET ORAL DAILY
Qty: 30 TABLET | Refills: 1 | Status: SHIPPED | OUTPATIENT
Start: 2022-12-15

## 2022-12-15 RX ADMIN — DIVALPROEX SODIUM 750 MG: 500 TABLET, EXTENDED RELEASE ORAL at 09:36

## 2022-12-15 RX ADMIN — BUPROPION HYDROCHLORIDE 150 MG: 150 TABLET, EXTENDED RELEASE ORAL at 09:36

## 2022-12-15 RX ADMIN — CYANOCOBALAMIN TAB 500 MCG 500 MCG: 500 TAB at 09:36

## 2022-12-15 NOTE — PROGRESS NOTES
Group Note    Date: 12/15/22  Start Time: 8:00 PM   End Time:8:30 PM     Number of Participants: 13    Type of Group: Community/Goal     Patient's Goal:      Notes:      Status After Intervention:  Unchanged    Participation Level: Interactive    Participation Quality: Attentive    Speech:  normal    Thought Process/Content: Logical    Mood:  appropriate for group    Level of consciousness:  Alert    Response to Learning: Progressing to goal    Modes of Intervention: Education and Support    Discipline Responsible: Behavioral Health Technician     Signature:  Aranza Anthony

## 2022-12-15 NOTE — DISCHARGE INSTRUCTIONS
Medications:   Medication Summary Provided. I understand that I should take only the medications on my list.   --why and when I need to take each medication. --which side effects to watch for.   --that I should carry my medication information at all times in case of emergency    Situations. --I will take all medications until discontinued by physician. I will take all my medications to follow up appointments. --check with my physician or pharmacist before taking any new medication, over    the counter product or drink alcohol.   --ask about food, drug and dietary supplement interactions. --discard old lists and update records with medication providers. Behavior Health Follow Up:  Original Referral Source: ED  Discharge Diagnosis: [unfilled]  Recomendations for Level of Care: Follow Up  Patient Status at Discharge: Stable  [facility]  [facility]: [facility]  Aftercare plan faxed:    Faxed by: Social Work staff   Date: 12/15/22   Time:   Prescriptions sent with pt. Scripts sent to [employer] [facility] [employer].      General Information:   Questions regarding your bill: Call [phone]   Suicide Hotline (Rescue Crisis) 9-898.670.8236   To obtain results of pending studies call Medical Records at: [phone]   For emergencies and 24 hour/7 days a week contact information: [phone]

## 2022-12-15 NOTE — DISCHARGE SUMMARY
Patient ID:  Isacc Mon  [id number]  83 y.o.  1990    Admit date: 12/11/2022  Discharge date: 12/15/2022    Admitting Physician: Erik Cardona MD   Attending Physician: Adri Lancaster MD  Discharge Provider: Tobi Ojeda MD     Admission Diagnoses: Depression with suicidal ideation [F32. A, L51.217]  Acute alcoholic intoxication without complication ([facility] [W78.789]  Bipolar affective disorder, current episode mixed, without psychotic features ([facility] [F31.60]    Discharge Diagnoses: Bipolar affective disorder, most recent episode mixed, without psychotic features  Alcohol use disorder, severe, uncomplicated  Tobacco use disorder, severe  History of methamphetamine use disorder, in remission  History of cannabis use disorder, in remission  Treatment noncompliance  Suicidal ideations  Homelessness  Unemployment  Vitamin D deficiency  Vitamin B12 deficiency    Admission Condition: poor    Discharged Condition: stable    Indication for Admission: Treatment noncompliance, suicidal ideations    HPI:     The patient is a 29 y.o. male with previous psychiatric history of bipolar disorder, alcohol use disorder, methamphetamine use disorder, cannabis use disorder, history of treatment noncompliance, who has been admitted to our psychiatric unit, secondary to treatment noncompliance and suicidal ideations. Patient is well-known to psychiatry due to multiple previous admissions to our psychiatric unit, with last admission 3 months ago, when he was admitted to our psychiatric unit with same clinical presentation, as at present time. Patient reported that after last discharge from the hospital 3 months ago, he was taking his medications only for 1 month and then stopped taking medications due to lack of the refills. Patient reported that he did not follow with his appointments in [facility]. He reported that he was jailed for 30 days for \"shoplifting\", stated \"I was wrongly accused. I did not steal anything\". He was released from snf 3 days ago, stayed with his friend a few days and was drinking alcohol, a pint of whiskey every day for 3 days and yesterday decided to come to the hospital and ask for help to restart his medications. Today during the interview, patient endorses feeling of depression, anxiety, decreased appetite, decreased quality of sleep, low motivation, low concentration, decreased pleasure in previously enjoyed activities. He denies feeling of hopelessness, helplessness and worthlessness. Patient denies current withdrawal symptoms from alcohol. He denies current active suicidal or homicidal ideations, endorses intermittent suicidal thoughts \"on and off multiple times during the day\", stated that last time when he experienced suicidal ideations is today on the morning after he woke up. He denies any suicidal plans. Patient denies auditory and visual hallucinations. He did not endorse any delusions or paranoid thoughts. PSYCHIATRIC HISTORY:    Diagnoses: Bipolar disorder, alcohol use disorder, tobacco use disorder, methamphetamine use disorder, cannabis use disorder  Suicide attempts/gestures: Denies   Prior hospitalizations: [facility] facility for drug use disorder. Multiple to [facility] psychiatric unit with last admission in September 2022  Medication trials: Bupropion, Depakote, Celexa, trazodone, Minipress  Mental health contact: Previously in [address] behavioral health. Lost to follow-up   Head trauma: Denies      Hospital Course:   Patient was admitted to the adult psych behavioral health floor and was acclimated to the floor. Labs were reviewed and physical exam was completed by Dr. Edgardo Bloom and associates. Home medications were reconciled. FERNANDO was obtained and reviewed. During the hospital stay patient's home medications have been restarted at previously prescribed and recommended dose, due to patient's treatment noncompliance.   Patient has been provided with nicotine patch 21 mg / 24 hours for nicotine replacement. While in the hospital, patient has been diagnosed with vitamin B12 and vitamin D deficiencies and he has been started on vitamin B12 500 MCG daily and vitamin D 50,000 units weekly. Patient attended and participated in groups. The patient did interact well with other patients and staff on the unit. Behaviorally he was not a problem. Patient was compliant with his medications. Patient was sleeping through the night. This patient is not suicidal, homicidal or psychotic at discharge. He does not present a danger to self or others. With the above mentioned medications changes as well as psychotherapeutic interventions, the patient reported considerable improvement in his condition. On 12/15/2022 it was therefore decided to discharge the patient, per patient's personal request and as it was felt that he received maximal benefit from his hospitalization.       Number of antipsychotic medication prescribed at discharge: None  IF MORE THAN ONE IS USED: NA    History of greater than 3 failed multiple monotherapy trials: NA  Monotherapy taper plan/ cross taper in progress: NA  Augmentation of Clozapine: NA    Referral to addiction treatment: Patient refused    Prescription for Alcohol or Drug Disorder Medication: Patient refused    Prescription for Tobacco Cessation medication: Patient refused    If no prescriptions for Tobacco Cessation must document why: Patient refused    Consults: Internal medicine    Significant Diagnostic Studies:     Lab Results   Component Value Date    WBC 8.5 12/11/2022    HGB 14.8 12/11/2022    HCT 43.1 12/11/2022    MCV 89.0 12/11/2022     12/11/2022     Lab Results   Component Value Date     12/13/2022    K 4.5 12/13/2022     12/13/2022    CO2 31 (H) 12/13/2022    BUN 12 12/13/2022    CREATININE 0.7 12/13/2022    GLUCOSE 86 12/13/2022    CALCIUM 9.1 12/13/2022    PROT 7.6 12/11/2022    LABALBU 4.6 12/11/2022 BILITOT <0.2 12/11/2022    ALKPHOS 78 12/11/2022    AST 21 12/11/2022    ALT 11 12/11/2022    LABGLOM >60 12/13/2022    GFRAA >59 09/06/2022    GLOB 3.0 02/03/2016       Lab Results   Component Value Date    TSHFT4 3.12 12/13/2022    TSH 1.340 03/24/2020     Lab Results   Component Value Date    KSIRAHKT37 387 12/13/2022     Lab Results   Component Value Date    VITD25 16.4 (L) 12/13/2022        Treatments: therapies: SW    Mental Status Examination:  Alert, Oriented X 4  Appearance:  Proper Grooming and Hygiene  Speech with Regular Rate and Rhythm  Eye Contact:  Good  No Psychomotor Agitation/Retardation Noted  Attitude:  Cooperative  Mood:  \"Good\"  Affective: Congruent, appropriate to the situation, with a normal range and intensity  Thought Processes:  Coherently communicated, logical and goal oriented  Thought Content:  No Suicidal Ideation, No Homicidal Ideation, No Auditory or Visual Hallucinations, No Overt Delusions  Insight:  Present  Judgement:  Normal  Memory is intact for both remote and recent  Intellectual Functioning:  Within the [address] of Knowledge:  Adequate  Attention and Concentration:  Adequate      Discharge Exam:  Please, see medical note    Disposition:  [facility] homeless shelter    Patient Instructions:   Current Discharge Medication List        START taking these medications    Details   hydrOXYzine HCl (ATARAX) 25 MG tablet Take 1 tablet by mouth 3 times daily as needed for Anxiety  Qty: 90 tablet, Refills: 1           CONTINUE these medications which have CHANGED    Details   !! divalproex (DEPAKOTE ER) 250 MG extended release tablet Take 1 tablet by mouth daily Take 250 mg tab with 500 mg tab for total dose of 750 mg  Qty: 30 tablet, Refills: 1      !! divalproex (DEPAKOTE ER) 500 MG extended release tablet Take 1 tablet by mouth daily Take 500 mg tab with 250 mg tab for total dose of 750 mg  Qty: 30 tablet, Refills: 1      buPROPion (WELLBUTRIN XL) 150 MG extended release tablet Take 1 tablet by mouth daily  Qty: 30 tablet, Refills: 1      traZODone (DESYREL) 50 MG tablet Take 1 tablet by mouth nightly  Qty: 30 tablet, Refills: 1      cyanocobalamin 500 MCG tablet Take 1 tablet by mouth daily  Qty: 30 tablet, Refills: 2      Vitamin D, Ergocalciferol, 89183 units CAPS Take 50,000 Units by mouth once a week for 11 doses  Qty: 11 capsule, Refills: 0       !! - Potential duplicate medications found. Please discuss with provider. Activity: activity as tolerated  Diet: regular diet  Wound Care: none needed    [facility].     Time worked: More than 31 minutes    Participation: good    Electronically signed by Jesse Pineda MD on 12/15/2022 at 8:55 AM

## 2022-12-15 NOTE — PLAN OF CARE
Problem: Anxiety  Goal: Will report anxiety at manageable levels  Description: INTERVENTIONS:  1. Administer medication as ordered  2. Teach and rehearse alternative coping skills  3. Provide emotional support with 1:1 interaction with staff  Outcome: Completed     Problem: Coping  Goal: Pt/Family able to verbalize concerns and demonstrate effective coping strategies  Description: INTERVENTIONS:  1. Assist patient/family to identify coping skills, available support systems and cultural and spiritual values  2. Provide emotional support, including active listening and acknowledgement of concerns of patient and caregivers  3. Reduce environmental stimuli, as able  4. Instruct patient/family in relaxation techniques, as appropriate  5. Assess for spiritual pain/suffering and initiate Spiritual Care, Psychosocial Clinical Specialist consults as needed  Outcome: Completed     Problem: Decision Making  Goal: Pt/Family able to effectively weigh alternatives and participate in decision making related to treatment and care  Description: INTERVENTIONS:  1. Determine when there are differences between patient's view, family's view, and healthcare provider's view of condition  2. Facilitate patient and family articulation of goals for care  3. Help patient and family identify pros/cons of alternative solutions  4. Provide information as requested by patient/family  5. Respect patient/family right to receive or not to receive information  6. Serve as a liaison between patient and family and health care team  7. Initiate Consults from Ethics, Palliative Care or initiate [facility] as is appropriate  Outcome: Completed     Problem: Behavior  Goal: Pt/Family maintain appropriate behavior and adhere to behavioral management agreement, if implemented  Description: INTERVENTIONS:  1. Assess patient/family's coping skills and  non-compliant behavior (including use of illegal substances)  2.  Notify security of behavior or suspected illegal substances which indicate the need for search of the family and/or belongings  3. Encourage verbalization of thoughts and concerns in a socially appropriate manner  4. Utilize positive, consistent limit setting strategies supporting safety of patient, staff and others  5. Encourage participation in the decision making process about the behavioral management agreement  6. If a visitor's behavior poses a threat to safety call refer to organization policy. 7. Initiate consult with , Psychosocial CNS, Spiritual Care as appropriate  Outcome: Completed     Problem: Depression/Self Harm  Goal: Effect of psychiatric condition will be minimized and patient will be protected from self harm  Description: INTERVENTIONS:  1. Assess impact of patient's symptoms on level of functioning, self care needs and offer support as indicated  2. Assess patient/family knowledge of depression, impact on illness and need for teaching  3. Provide emotional support, presence and reassurance  4. Assess for possible suicidal thoughts or ideation. If patient expresses suicidal thoughts or statements do not leave alone, initiate Suicide Precautions, move to a room close to the nursing station and obtain sitter  5.  Initiate consults as appropriate with Mental Health Professional, Spiritual Care, Psychosocial CNS, and consider a recommendation to the LIP for a Psychiatric Consultation  Outcome: Completed     Problem: Self Harm/Suicidality  Goal: Will have no self-injury during hospital stay  Description: INTERVENTIONS:  1. Q 30 MINUTES: Routine safety checks  2. Q SHIFT & PRN: Assess risk to determine if routine checks are adequate to maintain patient safety  Outcome: Completed

## 2022-12-15 NOTE — PROGRESS NOTES
Progress Note  Paddy Pascal  12/15/2022 9:06 AM  Subjective:   Admit Date:   12/11/2022      CC/ADMIT DX:       Interval History:   Reviewed overnight events and nursing notes. He has no new medical issues. I have reviewed all labs/diagnostics from the last 24hrs. ROS:   I have done a 10 point ROS and all are negative, except what is mentioned in the HPI. ADULT DIET; Regular    Medications:      buPROPion  150 mg Oral Daily    divalproex  750 mg Oral Daily    cyanocobalamin  500 mcg Oral Daily    vitamin D  50,000 Units Oral Weekly    nicotine  1 patch TransDERmal Daily    traZODone  50 mg Oral Nightly           Objective:   Vitals: /70   Pulse 87   Temp 97.3 °F (36.3 °C) (Temporal)   Resp 16   Ht 5' 11\" (1.803 m)   Wt 135 lb (61.2 kg)   SpO2 97%   BMI 18.83 kg/m²  No intake or output data in the 24 hours ending 12/15/22 0906  General appearance: alert and cooperative with exam  Extremities: extremities normal, atraumatic, no cyanosis or edema  Neurologic:  No obvious focal neurologic deficits. Skin: no rashes    Assessment and Plan:   Principal Problem:    Depression with suicidal ideation  Active Problems:    Bipolar affective disorder, current episode mixed, without psychotic features ([address]  Resolved Problems:    * No resolved hospital problems. *    Vit D Def    Plan:   Continue present medication(s)    He remains medically stable. I will monitor for any changes or concerns. Follow with Psych      Discharge planning:    home     Reviewed treatment plans with the patient and/or family.              Electronically signed by Aileen Guzman MD on 12/15/2022 at 9:06 AM

## 2022-12-15 NOTE — DISCHARGE INSTR - ACTIVITY
Up ad jean pierre
PAST MEDICAL HISTORY:  Hyperlipidemia, unspecified hyperlipidemia type     Hypertension, unspecified type

## 2022-12-15 NOTE — PLAN OF CARE
Group Therapy Note    Date: 12/15/2022  Start Time: 1000  End Time:  1030  Number of Participants: 14    Type of Group: Psychoeducation    Wellness Binder Information  Module Name:  Women's Issues  Session Number:  3    Group Goal for Pt: To improve knowledge of how to cope with depression    Notes:  Pt demonstrated improved knowledge of how to cope with depression by actively participating in group discussion. Status After Intervention:  Unchanged    Participation Level:  Active Listener and Interactive    Participation Quality: Appropriate and Attentive      Speech:  normal      Thought Process/Content: Logical      Affective Functioning: Congruent      Mood: anxious and depressed      Level of consciousness:  Alert and Oriented x4      Response to Learning: Able to verbalize current knowledge/experience, Able to verbalize/acknowledge new learning, and Progressing to goal      Endings: None Reported    Modes of Intervention: Education      Discipline Responsible: Psychoeducational Specialist      Signature:  Law Bergman

## 2022-12-15 NOTE — PROGRESS NOTES
Treatment Team Note:    Target Symptoms/Reason for admission: Per nursing admission assessment - Reason for Admission: Pt is a 29year old male who presented to the ED with suicidal ideations without a specific plan. Pt reported he has visualized hurting himself in many different wats, but has no actual suicidal attempts. Pt reports history of bipolar disorder, alcohol use disorder, canaabis use disorder, and methamphetamine use disorder. Reports he is currently homeless. Previous hospitalization at [facility] in September 2022. Denies HI and AVH. Diagnoses per psych provider: Depression with suicidal ideation [F32. A, B34.714]  Acute alcoholic intoxication without complication ([facility] [O46.445]  Bipolar affective disorder, current episode mixed, without psychotic features ([facility] [F31.60]    Therapist met with treatment team to discuss patients treatment and discharge plans. Patient's aftercare plan is: [address]    Aftercare appointments made: Yes    Pt lives with:  Pt is homeless    Collateral obtained from: SW will meet with patient to gather information  Collateral obtained on: Attending groups: Yes    Behavior: calm and cooperative, social with peers/staff, reports looking forward to discharge. Has patient been completing ADL's:  Yes    SI:  patient denies SI  Plan: no   If yes describe: N/A - patient denies plan  HI:  patient denies HI  If present describe: N/A  Delusions: patient denies delusions  If present describe: N/A  Hallucinations: patient denies hallucinations  If present describe: N/A    Patient rates their -- Depression: 1-10:  0  Anxiety:1-10:  0    Sleeping:Yes    Taking medication: Yes    Misc: Pt will be discharged today.

## 2022-12-15 NOTE — GROUP NOTE
Group Therapy Note    Date: 12/14/2022    Group Start Time: 1730  Group End Time: 1815  Group Topic: Nursing    [facility]    Jessica Calderon RN; Karla Lennon RN    Group Therapy Note                                                                    Group Note    Date: 12/14/22  Start Time: 5:30 PM   End Time:6:15 PM     Number of Participants: 10    Type of Group: Nursing Education     Patient's Goal:  Understand different perceptions of others    Notes:      Status After Intervention:  Improved    Participation Level:  Active Listener    Participation Quality: Appropriate    Speech:  normal    Thought Process/Content: Linear    Mood:  calm and cooperative    Level of consciousness:  Alert    Response to Learning: Able to verbalize/acknowledge new learning    Modes of Intervention: Education and Support    Discipline Responsible: Registered Nurse     Signature:  Jessica Calderon RN   Electronically signed by Jessica Calderon RN on 12/14/2022 at 6:25 PM

## 2022-12-15 NOTE — PROGRESS NOTES
[facility] Adult Unit Daily Assessment  Nursing Progress Note    Room: 0607/607-02   Name: Boris Coffey   Age: 29 y.o. Gender: male   Dx: Depression with suicidal ideation  Precautions: suicide risk  Inpatient Status: voluntary       SLEEP:  Sleep Quality Good  Sleep Medications: Yes; Trazadone 50mg   PRN Sleep Meds: No      MEDICAL:  Other PRN Meds: Yes; atarax 25mg   Med Compliant: Yes  Accu-Chek: No  Oxygen/CPAP/BiPAP: No  CIWA/CINA: No   PAIN Assessment: denies  Side Effects from medication: none voiced      Metabolic Screening:  Lab Results   Component Value Date    LABA1C 4.8 12/13/2022     Lab Results   Component Value Date    CHOL 116 (L) 02/07/2022     Lab Results   Component Value Date    TRIG 98 02/07/2022     Lab Results   Component Value Date    HDL 52 (L) 02/07/2022     No components found for: LDLCAL  No results found for: LABVLDL  Body mass index is 18.83 kg/m². BP Readings from Last 2 Encounters:   12/14/22 117/69   09/09/22 116/70         Medical Bed:   Is patient in a medical bed? no   If medical bed is in use, has nursing secured room while patient is awake and out of the room? NA  Has safety checks by nursing been completed on the bed/room this shift? yes    Protective Factors:  Patient identifies protective factors with nursing staff as follows: Identifies reasons for living: Yes   Supportive Social Network or family: No    Belief that suicide is immoral/high spirituality: Yes   Responsibility to family or others/living with family: No   Fear of death or dying due to pain and suffering: Yes   Engaged in work or school: No     If Patient is unable to identify, reason why?  N/A      PSYCH:  Depression: denies   Anxiety: denies   SI denies suicidal ideation   Risk of Suicide: No Risk  HI Negative for homicidal ideation      AVH:denies If Hallucinations are present, describe? denies        GENERAL:  Appetite: good   Percent Meals: no meals consumed during this shift   Social: Yes   Speech: normal   Appearance: appropriately dressed    GROUP:  Group Participation: Yes  Participation Quality: Minimal    Notes: Pt was calm and cooperative with his interview tonight. He completed his ADL's and was social with peers and staff and attended group. He reports good sleep and appetite. He denied depression, anxiety, SI, HI and AVH. He was med compliant tonight. He reports looking forward to discharge tomorrow. Monitoring for safety continues as ordered.         Electronically signed by Chhaya Ojeda RN on 12/14/22 at 9:55 PM CST

## 2022-12-15 NOTE — PROGRESS NOTES
Behavioral Health   Discharge Note  Bridge Appointment completed: Reviewed Discharge Instructions with patient. Patient verbalizes understanding and agreement with the discharge plan using the teachback method. Referral for Outpatient Tobacco Cessation Counseling, upon discharge (nohemi X if applicable and completed):    ( )  Hospital staff assisted patient to call Quit Line or faxed referral                                   during hospitalization                  ( )  Recognizing danger situations (included triggers and roadblocks), if not completed on admission                    ( )  Coping skills (new ways to manage stress, exercise, relaxation techniques, changing routine, distraction), if not completed on admission                                                           ( )  Basic information about quitting (benefits of quitting, techniques in how to quit, available resources, if not completed on admission  ( ) Referral for counseling faxed to [facility]   ( ) Patient refused referral  ( ) Patient refused counseling  ( x) Patient refused smoking cessation medication upon discharge    Vaccinations (nohemi X if applicable and completed):  ( ) Patient states already received influenza vaccine elsewhere  ( ) Patient received influenza vaccine during this hospitalization  (x ) Patient refused influenza vaccine at this time      Pt discharged with followings belongings:   Dental Appliances: None  Vision - Corrective Lenses: Contact Lenses  Hearing Aid: None  Jewelry: Watch  Body Piercings Removed: N/A  Clothing: Shirt, Footwear, Pants, Socks  Other Valuables: Lighter/Matches, Money   Valuables sent home with patient. Valuables retrieved from safe and returned to patient. yes Patient left department with staff via ambulatory discharged to self care. Patient education on aftercare instructions: yes  Patient verbalize understanding of AVS:  yes. Suicidal Ideations? No Risk of Suicide: No Risk AVH? denies HI? Negative for homicidal ideation       Status EXAM upon discharge:  Mental Status and Behavioral Exam  Normal: No  Level of Assistance: Independent/Self  Facial Expression: Flat  Affect: Congruent  Level of Consciousness: Alert  Frequency of Checks: 4 times per hour, close  Mood:Normal: Yes  Mood:  (denies)  Motor Activity:Normal: Yes  Motor Activity: Other (comment) (normal for pt)  Eye Contact: Good  Observed Behavior: Cooperative  Sexual Misconduct History: Current - no  Preception: [place] to person, [place] to time, [place] to place, [place] to situation  Attention:Normal: Yes  Thought Processes: Other (comment) (normal for pt)  Thought Content:Normal: Yes  Thought Content: Other (comment) (normal for pt)  Depression Symptoms: Change in energy level, Loss of interest  Anxiety Symptoms: Generalized  Mellisa Symptoms: No problems reported or observed. Hallucinations: None  Delusions: No  Memory:Normal: Yes  Memory: Other (comment) (normal for pt)  Insight and Judgment: No  Insight and Judgment: Poor insight, Poor judgment (improving)    AVS/Transition Record has been discussed with patient and a copy was given to the patient. The AVS/Transition Record was faxed to the next level of care today. All valuables are returned in full. Appointment dates and times are given for follow up[facility]

## 2022-12-15 NOTE — PROGRESS NOTES
Progress Note  Zaire Mcdaniel  12/14/2022 9:17 PM  Subjective:   Admit Date:   12/11/2022      CC/ADMIT DX:       Interval History:   Reviewed overnight events and nursing notes. He has no new physical complaints. I have reviewed all labs/diagnostics from the last 24hrs. ROS:   I have done a 10 point ROS and all are negative, except what is mentioned in the HPI. ADULT DIET; Regular    Medications:      buPROPion  150 mg Oral Daily    divalproex  750 mg Oral Daily    cyanocobalamin  500 mcg Oral Daily    vitamin D  50,000 Units Oral Weekly    nicotine  1 patch TransDERmal Daily    traZODone  50 mg Oral Nightly           Objective:   Vitals: /69   Pulse 90   Temp 98.1 °F (36.7 °C) (Temporal)   Resp 16   Ht 5' 11\" (1.803 m)   Wt 135 lb (61.2 kg)   SpO2 99%   BMI 18.83 kg/m²  No intake or output data in the 24 hours ending 12/14/22 2117  General appearance: alert and cooperative with exam  Extremities: extremities normal, atraumatic, no cyanosis or edema  Neurologic:  No obvious focal neurologic deficits. Skin: no rashes    Assessment and Plan:   Principal Problem:    Depression with suicidal ideation  Active Problems:    Bipolar affective disorder, current episode mixed, without psychotic features ([address]  Resolved Problems:    * No resolved hospital problems. *    Vit D Def    Plan:   Continue present medication(s)    He is medically stable. I will monitor for any changes or concerns. Follow with Psych      Discharge planning:    home     Reviewed treatment plans with the patient and/or family.              Electronically signed by Kala Young MD on 12/14/2022 at 9:17 PM

## 2022-12-16 NOTE — PROGRESS NOTES
Discharge Note     Patient is discharging on this date. Patient denies SI, HI, and AVH at this time. Patient reports improvement in behavior and is leaving unit in overall good condition. SW and patient discussed patient's follow up appointments and importance of attending appointments as scheduled, patient voiced understanding and agreement. Patient and SW also discussed patient's safety plan and patient was able to verbally identify: warning signs, coping strategies, places and people that help make the patient feel better/distract negative thoughts, friends/family/agencies/professionals the patient can reach out to in a crisis, and something that is important to the patient/worth living for. Patient was provided the national suicide prevention hotline number (9-046-868-538-359-7333) as well as local community behavioral health [facility]) crisis number for emergencies (4-905-149-999-950-0018). Discharge Disposition: [facility]      Pt to follow up with:  [address] on December 20 , 2022 at 3:00 PM for the intake appointment. Patient will follow up with [facility]  On December  3 , 2022   at 3:30 PM for the medication management appointment.      Referral to outpatient tobacco cessation counseling treatment:  Patient refused referral to outpatient tobacco cessation counseling    SW offered to assist patient with transportation, patient accepted transportation assistance - [employer] transportation was provided by cab

## 2023-09-02 ENCOUNTER — HOSPITAL ENCOUNTER (INPATIENT)
Age: 33
LOS: 2 days | Discharge: HOME | DRG: 885 | End: 2023-09-04
Attending: EMERGENCY MEDICINE | Admitting: PSYCHIATRY & NEUROLOGY
Payer: MEDICAID

## 2023-09-02 DIAGNOSIS — F10.920 ACUTE ALCOHOLIC INTOXICATION WITHOUT COMPLICATION (HCC): ICD-10-CM

## 2023-09-02 DIAGNOSIS — R45.851 DEPRESSION WITH SUICIDAL IDEATION: Primary | ICD-10-CM

## 2023-09-02 DIAGNOSIS — F32.A DEPRESSION WITH SUICIDAL IDEATION: Primary | ICD-10-CM

## 2023-09-02 PROBLEM — F39 UNSPECIFIED MOOD (AFFECTIVE) DISORDER (HCC): Status: ACTIVE | Noted: 2023-09-02

## 2023-09-02 PROBLEM — G47.00 INSOMNIA: Status: ACTIVE | Noted: 2022-02-08

## 2023-09-02 PROBLEM — F10.90 ALCOHOL USE DISORDER: Status: ACTIVE | Noted: 2023-09-02

## 2023-09-02 LAB
ALBUMIN SERPL-MCNC: 4.6 G/DL (ref 3.5–5.2)
ALP SERPL-CCNC: 66 U/L (ref 40–130)
ALT SERPL-CCNC: 16 U/L (ref 5–41)
AMPHET UR QL SCN: NEGATIVE
ANION GAP SERPL CALCULATED.3IONS-SCNC: 17 MMOL/L (ref 7–19)
AST SERPL-CCNC: 37 U/L (ref 5–40)
BARBITURATES UR QL SCN: NEGATIVE
BASOPHILS # BLD: 0.1 K/UL (ref 0–0.2)
BASOPHILS NFR BLD: 0.9 % (ref 0–1)
BENZODIAZ UR QL SCN: NEGATIVE
BILIRUB SERPL-MCNC: 0.3 MG/DL (ref 0.2–1.2)
BILIRUB UR QL STRIP: NEGATIVE
BUN SERPL-MCNC: 19 MG/DL (ref 6–20)
BUPRENORPHINE URINE: NEGATIVE
CALCIUM SERPL-MCNC: 8.9 MG/DL (ref 8.6–10)
CANNABINOIDS UR QL SCN: NEGATIVE
CHLORIDE SERPL-SCNC: 103 MMOL/L (ref 98–111)
CLARITY UR: ABNORMAL
CO2 SERPL-SCNC: 21 MMOL/L (ref 22–29)
COCAINE UR QL SCN: NEGATIVE
COLOR UR: YELLOW
CREAT SERPL-MCNC: 0.7 MG/DL (ref 0.5–1.2)
DRUG SCREEN COMMENT UR-IMP: NORMAL
EOSINOPHIL # BLD: 0.1 K/UL (ref 0–0.6)
EOSINOPHIL NFR BLD: 0.7 % (ref 0–5)
ERYTHROCYTE [DISTWIDTH] IN BLOOD BY AUTOMATED COUNT: 12.5 % (ref 11.5–14.5)
ETHANOLAMINE SERPL-MCNC: 100 MG/DL (ref 0–0.08)
ETHANOLAMINE SERPL-MCNC: 77 MG/DL (ref 0–0.08)
GLUCOSE SERPL-MCNC: 83 MG/DL (ref 74–109)
GLUCOSE UR STRIP.AUTO-MCNC: NEGATIVE MG/DL
HCT VFR BLD AUTO: 37.5 % (ref 42–52)
HGB BLD-MCNC: 13 G/DL (ref 14–18)
HGB UR STRIP.AUTO-MCNC: NEGATIVE MG/L
IMM GRANULOCYTES # BLD: 0 K/UL
KETONES UR STRIP.AUTO-MCNC: 15 MG/DL
LEUKOCYTE ESTERASE UR QL STRIP.AUTO: NEGATIVE
LYMPHOCYTES # BLD: 1.5 K/UL (ref 1.1–4.5)
LYMPHOCYTES NFR BLD: 17.5 % (ref 20–40)
MCH RBC QN AUTO: 29.8 PG (ref 27–31)
MCHC RBC AUTO-ENTMCNC: 34.7 G/DL (ref 33–37)
MCV RBC AUTO: 86 FL (ref 80–94)
METHADONE UR QL SCN: NEGATIVE
METHAMPHETAMINE, URINE: NEGATIVE
MONOCYTES # BLD: 0.3 K/UL (ref 0–0.9)
MONOCYTES NFR BLD: 3.3 % (ref 0–10)
NEUTROPHILS # BLD: 6.8 K/UL (ref 1.5–7.5)
NEUTS SEG NFR BLD: 77.4 % (ref 50–65)
NITRITE UR QL STRIP.AUTO: NEGATIVE
OPIATES UR QL SCN: NEGATIVE
OXYCODONE UR QL SCN: NEGATIVE
PCP UR QL SCN: NEGATIVE
PH UR STRIP.AUTO: 5.5 [PH] (ref 5–8)
PLATELET # BLD AUTO: 240 K/UL (ref 130–400)
PMV BLD AUTO: 10 FL (ref 9.4–12.4)
POTASSIUM SERPL-SCNC: 4 MMOL/L (ref 3.5–5)
PROPOXYPH UR QL SCN: NEGATIVE
PROT SERPL-MCNC: 7.5 G/DL (ref 6.6–8.7)
PROT UR STRIP.AUTO-MCNC: ABNORMAL MG/DL
RBC # BLD AUTO: 4.36 M/UL (ref 4.7–6.1)
SARS-COV-2 RDRP RESP QL NAA+PROBE: NOT DETECTED
SODIUM SERPL-SCNC: 141 MMOL/L (ref 136–145)
SP GR UR STRIP.AUTO: 1.02 (ref 1–1.03)
TRICYCLIC, URINE: NEGATIVE
UROBILINOGEN UR STRIP.AUTO-MCNC: 0.2 E.U./DL
WBC # BLD AUTO: 8.8 K/UL (ref 4.8–10.8)

## 2023-09-02 PROCEDURE — 80306 DRUG TEST PRSMV INSTRMNT: CPT

## 2023-09-02 PROCEDURE — 82306 VITAMIN D 25 HYDROXY: CPT

## 2023-09-02 PROCEDURE — 87635 SARS-COV-2 COVID-19 AMP PRB: CPT

## 2023-09-02 PROCEDURE — 84443 ASSAY THYROID STIM HORMONE: CPT

## 2023-09-02 PROCEDURE — 6370000000 HC RX 637 (ALT 250 FOR IP): Performed by: PSYCHIATRY & NEUROLOGY

## 2023-09-02 PROCEDURE — 36415 COLL VENOUS BLD VENIPUNCTURE: CPT

## 2023-09-02 PROCEDURE — 99285 EMERGENCY DEPT VISIT HI MDM: CPT

## 2023-09-02 PROCEDURE — 85025 COMPLETE CBC W/AUTO DIFF WBC: CPT

## 2023-09-02 PROCEDURE — 81003 URINALYSIS AUTO W/O SCOPE: CPT

## 2023-09-02 PROCEDURE — 1240000000 HC EMOTIONAL WELLNESS R&B

## 2023-09-02 PROCEDURE — 82607 VITAMIN B-12: CPT

## 2023-09-02 PROCEDURE — 82077 ASSAY SPEC XCP UR&BREATH IA: CPT

## 2023-09-02 PROCEDURE — 80053 COMPREHEN METABOLIC PANEL: CPT

## 2023-09-02 RX ORDER — BUPROPION HYDROCHLORIDE 150 MG/1
150 TABLET ORAL DAILY
Status: DISCONTINUED | OUTPATIENT
Start: 2023-09-02 | End: 2023-09-04 | Stop reason: HOSPADM

## 2023-09-02 RX ORDER — MECOBALAMIN 5000 MCG
5 TABLET,DISINTEGRATING ORAL NIGHTLY
Status: DISCONTINUED | OUTPATIENT
Start: 2023-09-02 | End: 2023-09-04 | Stop reason: HOSPADM

## 2023-09-02 RX ORDER — POLYETHYLENE GLYCOL 3350 17 G/17G
17 POWDER, FOR SOLUTION ORAL DAILY PRN
Status: DISCONTINUED | OUTPATIENT
Start: 2023-09-02 | End: 2023-09-04 | Stop reason: HOSPADM

## 2023-09-02 RX ORDER — ACETAMINOPHEN 325 MG/1
650 TABLET ORAL EVERY 4 HOURS PRN
Status: DISCONTINUED | OUTPATIENT
Start: 2023-09-02 | End: 2023-09-04 | Stop reason: HOSPADM

## 2023-09-02 RX ORDER — TRAZODONE HYDROCHLORIDE 50 MG/1
50 TABLET ORAL NIGHTLY PRN
Status: DISCONTINUED | OUTPATIENT
Start: 2023-09-02 | End: 2023-09-04 | Stop reason: HOSPADM

## 2023-09-02 RX ORDER — HYDROXYZINE HYDROCHLORIDE 25 MG/1
50 TABLET, FILM COATED ORAL 3 TIMES DAILY PRN
Status: DISCONTINUED | OUTPATIENT
Start: 2023-09-02 | End: 2023-09-04 | Stop reason: HOSPADM

## 2023-09-02 RX ORDER — DIVALPROEX SODIUM 500 MG/1
500 TABLET, EXTENDED RELEASE ORAL NIGHTLY
Status: DISCONTINUED | OUTPATIENT
Start: 2023-09-02 | End: 2023-09-04 | Stop reason: HOSPADM

## 2023-09-02 RX ADMIN — TRAZODONE HYDROCHLORIDE 50 MG: 50 TABLET ORAL at 20:57

## 2023-09-02 RX ADMIN — DIVALPROEX SODIUM 500 MG: 500 TABLET, FILM COATED, EXTENDED RELEASE ORAL at 20:57

## 2023-09-02 RX ADMIN — BUPROPION HYDROCHLORIDE 150 MG: 150 TABLET, EXTENDED RELEASE ORAL at 17:39

## 2023-09-02 SDOH — ECONOMIC STABILITY: HOUSING INSECURITY
IN THE LAST 12 MONTHS, WAS THERE A TIME WHEN YOU DID NOT HAVE A STEADY PLACE TO SLEEP OR SLEPT IN A SHELTER (INCLUDING NOW)?: YES

## 2023-09-02 SDOH — HEALTH STABILITY: PHYSICAL HEALTH: ON AVERAGE, HOW MANY DAYS PER WEEK DO YOU ENGAGE IN MODERATE TO STRENUOUS EXERCISE (LIKE A BRISK WALK)?: 0 DAYS

## 2023-09-02 SDOH — ECONOMIC STABILITY: TRANSPORTATION INSECURITY
IN THE PAST 12 MONTHS, HAS LACK OF TRANSPORTATION KEPT YOU FROM MEETINGS, WORK, OR FROM GETTING THINGS NEEDED FOR DAILY LIVING?: NO

## 2023-09-02 SDOH — ECONOMIC STABILITY: INCOME INSECURITY: IN THE LAST 12 MONTHS, WAS THERE A TIME WHEN YOU WERE NOT ABLE TO PAY THE MORTGAGE OR RENT ON TIME?: NO

## 2023-09-02 SDOH — HEALTH STABILITY: PHYSICAL HEALTH: ON AVERAGE, HOW MANY MINUTES DO YOU ENGAGE IN EXERCISE AT THIS LEVEL?: 0 MIN

## 2023-09-02 SDOH — ECONOMIC STABILITY: TRANSPORTATION INSECURITY
IN THE PAST 12 MONTHS, HAS THE LACK OF TRANSPORTATION KEPT YOU FROM MEDICAL APPOINTMENTS OR FROM GETTING MEDICATIONS?: NO

## 2023-09-02 SDOH — ECONOMIC STABILITY: FOOD INSECURITY: WITHIN THE PAST 12 MONTHS, THE FOOD YOU BOUGHT JUST DIDN'T LAST AND YOU DIDN'T HAVE MONEY TO GET MORE.: OFTEN TRUE

## 2023-09-02 SDOH — ECONOMIC STABILITY: FOOD INSECURITY: WITHIN THE PAST 12 MONTHS, YOU WORRIED THAT YOUR FOOD WOULD RUN OUT BEFORE YOU GOT MONEY TO BUY MORE.: OFTEN TRUE

## 2023-09-02 SDOH — ECONOMIC STABILITY: HOUSING INSECURITY

## 2023-09-02 ASSESSMENT — SOCIAL DETERMINANTS OF HEALTH (SDOH)
HOW OFTEN DO YOU GET TOGETHER WITH FRIENDS OR RELATIVES?: NEVER
HOW OFTEN DO YOU ATTENT MEETINGS OF THE CLUB OR ORGANIZATION YOU BELONG TO?: NEVER
WITHIN THE LAST YEAR, HAVE YOU BEEN AFRAID OF YOUR PARTNER OR EX-PARTNER?: NO
HOW HARD IS IT FOR YOU TO PAY FOR THE VERY BASICS LIKE FOOD, HOUSING, MEDICAL CARE, AND HEATING?: VERY HARD
ARE YOU MARRIED, WIDOWED, DIVORCED, SEPARATED, NEVER MARRIED, OR LIVING WITH A PARTNER?: NEVER MARRIED
IN A TYPICAL WEEK, HOW MANY TIMES DO YOU TALK ON THE PHONE WITH FAMILY, FRIENDS, OR NEIGHBORS?: NEVER
WITHIN THE LAST YEAR, HAVE TO BEEN RAPED OR FORCED TO HAVE ANY KIND OF SEXUAL ACTIVITY BY YOUR PARTNER OR EX-PARTNER?: NO
WITHIN THE LAST YEAR, HAVE YOU BEEN HUMILIATED OR EMOTIONALLY ABUSED IN OTHER WAYS BY YOUR PARTNER OR EX-PARTNER?: NO
DO YOU BELONG TO ANY CLUBS OR ORGANIZATIONS SUCH AS CHURCH GROUPS UNIONS, FRATERNAL OR ATHLETIC GROUPS, OR SCHOOL GROUPS?: NO
HOW OFTEN DO YOU ATTEND CHURCH OR RELIGIOUS SERVICES?: NEVER
WITHIN THE LAST YEAR, HAVE YOU BEEN KICKED, HIT, SLAPPED, OR OTHERWISE PHYSICALLY HURT BY YOUR PARTNER OR EX-PARTNER?: NO

## 2023-09-02 ASSESSMENT — SLEEP AND FATIGUE QUESTIONNAIRES
DO YOU USE A SLEEP AID: NO
AVERAGE NUMBER OF SLEEP HOURS: 8
DO YOU HAVE DIFFICULTY SLEEPING: NO

## 2023-09-02 ASSESSMENT — PATIENT HEALTH QUESTIONNAIRE - PHQ9
8. MOVING OR SPEAKING SO SLOWLY THAT OTHER PEOPLE COULD HAVE NOTICED. OR THE OPPOSITE, BEING SO FIGETY OR RESTLESS THAT YOU HAVE BEEN MOVING AROUND A LOT MORE THAN USUAL: 0
SUM OF ALL RESPONSES TO PHQ QUESTIONS 1-9: 13
6. FEELING BAD ABOUT YOURSELF - OR THAT YOU ARE A FAILURE OR HAVE LET YOURSELF OR YOUR FAMILY DOWN: SEVERAL DAYS
1. LITTLE INTEREST OR PLEASURE IN DOING THINGS: 3
4. FEELING TIRED OR HAVING LITTLE ENERGY: NOT AT ALL
SUM OF ALL RESPONSES TO PHQ QUESTIONS 1-9: 13
SUM OF ALL RESPONSES TO PHQ9 QUESTIONS 1 & 2: 6
4. FEELING TIRED OR HAVING LITTLE ENERGY: 0
2. FEELING DOWN, DEPRESSED OR HOPELESS: NEARLY EVERY DAY
SUM OF ALL RESPONSES TO PHQ QUESTIONS 1-9: 12
6. FEELING BAD ABOUT YOURSELF - OR THAT YOU ARE A FAILURE OR HAVE LET YOURSELF OR YOUR FAMILY DOWN: 3
5. POOR APPETITE OR OVEREATING: 0
8. MOVING OR SPEAKING SO SLOWLY THAT OTHER PEOPLE COULD HAVE NOTICED. OR THE OPPOSITE, BEING SO FIGETY OR RESTLESS THAT YOU HAVE BEEN MOVING AROUND A LOT MORE THAN USUAL: NOT AT ALL
SUM OF ALL RESPONSES TO PHQ9 QUESTIONS 1 & 2: 6
3. TROUBLE FALLING OR STAYING ASLEEP: NEARLY EVERY DAY
3. TROUBLE FALLING OR STAYING ASLEEP: 0
2. FEELING DOWN, DEPRESSED OR HOPELESS: 3
9. THOUGHTS THAT YOU WOULD BE BETTER OFF DEAD, OR OF HURTING YOURSELF: NEARLY EVERY DAY
SUM OF ALL RESPONSES TO PHQ QUESTIONS 1-9: 16
9. THOUGHTS THAT YOU WOULD BE BETTER OFF DEAD, OR OF HURTING YOURSELF: 1
7. TROUBLE CONCENTRATING ON THINGS, SUCH AS READING THE NEWSPAPER OR WATCHING TELEVISION: 3
SUM OF ALL RESPONSES TO PHQ QUESTIONS 1-9: 13
7. TROUBLE CONCENTRATING ON THINGS, SUCH AS READING THE NEWSPAPER OR WATCHING TELEVISION: NEARLY EVERY DAY
10. IF YOU CHECKED OFF ANY PROBLEMS, HOW DIFFICULT HAVE THESE PROBLEMS MADE IT FOR YOU TO DO YOUR WORK, TAKE CARE OF THINGS AT HOME, OR GET ALONG WITH OTHER PEOPLE: 3
10. IF YOU CHECKED OFF ANY PROBLEMS, HOW DIFFICULT HAVE THESE PROBLEMS MADE IT FOR YOU TO DO YOUR WORK, TAKE CARE OF THINGS AT HOME, OR GET ALONG WITH OTHER PEOPLE: NOT DIFFICULT AT ALL
5. POOR APPETITE OR OVEREATING: NOT AT ALL
1. LITTLE INTEREST OR PLEASURE IN DOING THINGS: NEARLY EVERY DAY

## 2023-09-02 ASSESSMENT — ENCOUNTER SYMPTOMS
EYES NEGATIVE: 1
GASTROINTESTINAL NEGATIVE: 1
RESPIRATORY NEGATIVE: 1

## 2023-09-02 ASSESSMENT — PAIN SCALES - GENERAL: PAINLEVEL_OUTOF10: 0

## 2023-09-02 ASSESSMENT — PAIN - FUNCTIONAL ASSESSMENT: PAIN_FUNCTIONAL_ASSESSMENT: 0-10

## 2023-09-02 NOTE — H&P
Department of Psychiatry  Attending History and Physical        CHIEF COMPLAINT: \"I need help\"    History obtained from: patient, chart    HISTORY OF PRESENT ILLNESS:    30-year-old white male with history of Bipolar, PTSD, substance abuse, homelessness, admitted with suicidal ideation.  on admission. Negative UDS. Patient was last admitted to our unit in 2022. Patient is observed resting in bed this morning. Calm and cooperative. Somewhat dysphoric. Endorses suicidal ideation. States he is visualizing self-harming. States he was released from nursing home 2 days ago. Spent about 9 months in nursing home for shoplifting. He is homeless. He has no family support. States he has been off medications while in nursing home. Believes that previous regimen worked for him. He has been depressed. Occasional mood swings and racing thoughts. Anxiety level high. Sleeping poorly. States he had several shots prior to coming to the ER.    PSYCHIATRIC HISTORY:    Diagnoses: Bipolar disorder, PTSD  Suicide attempts/gestures: Denies   Prior hospitalizations:  multiple   Medication trials: Bupropion, Depakote, Celexa, trazodone, Minipress   [facility] health [facility]   Head trauma: Denies    SUBSTANCE USE HISTORY:  History of alcohol, methamphetamine and cannabis abuse. Treated at [facility] rehab. Had several shots prior to coming to the ER. Smokes cigarettes. Past Medical History:    Past Medical History:   Diagnosis Date    Alcohol abuse     Bipolar 1 disorder ([address])     Post traumatic stress disorder (PTSD)        Past Surgical History:    Past Surgical History:   Procedure Laterality Date    OTHER SURGICAL HISTORY      cut tendon in right little finger       Medications Prior to Admission:   Prior to Admission medications    Medication Sig Start Date End Date Taking?  Authorizing Provider   divalproex (DEPAKOTE ER) 250 MG extended release tablet Take 1 tablet by mouth daily Take 250 mg

## 2023-09-02 NOTE — PLAN OF CARE
Problem: Coping  Goal: Pt/Family able to verbalize concerns and demonstrate effective coping strategies  Description: INTERVENTIONS:  1. Assist patient/family to identify coping skills, available support systems and cultural and spiritual values  2. Provide emotional support, including active listening and acknowledgement of concerns of patient and caregivers  3. Reduce environmental stimuli, as able  4. Instruct patient/family in relaxation techniques, as appropriate  5. Assess for spiritual pain/suffering and initiate Spiritual Care, Psychosocial Clinical Specialist consults as needed  Outcome: Progressing                                                Group Note     Date: 09/02/23  Start Time: 11:00 AM   End Time:11:30 AM      Number of Participants: 5     Type of Group: Activity     Patient's Goal:  Motivating patient to interact and express different emotions in group. Notes:  Encouraged patient to participate and discuss with group members      Status After Intervention:  Improved    Participation Level:  Active Listener    Participation Quality: Appropriate    Speech:  normal    Thought Process/Content: Logical    Mood: euthymic    Level of consciousness:  Alert    Response to Learning: Able to verbalize/acknowledge new learning    Modes of Intervention: Education and Support    Discipline Responsible: /Counselor     Signature:  SHELBI Garcia

## 2023-09-02 NOTE — ED NOTES
Report called to [facility] on [address], [address]  09/02/23 5576

## 2023-09-02 NOTE — PLAN OF CARE
Problem: Coping  Goal: Pt/Family able to verbalize concerns and demonstrate effective coping strategies  Description: INTERVENTIONS:  1. Assist patient/family to identify coping skills, available support systems and cultural and spiritual values  2. Provide emotional support, including active listening and acknowledgement of concerns of patient and caregivers  3. Reduce environmental stimuli, as able  4. Instruct patient/family in relaxation techniques, as appropriate  5. Assess for spiritual pain/suffering and initiate Spiritual Care, Psychosocial Clinical Specialist consults as needed  9/2/2023 1455 by SHELBI Martines  Outcome: Progressing                                              Group Note    Date: 09/02/23  Start Time: 2:00 PM   End Time:2:30 PM     Number of Participants: 6    Type of Group: Activity     Patient's Goal:  Motivating patient to interact and express different emotions in group. Notes:  Encouraged patient to participate and discuss with group members      Status After Intervention:  Improved    Participation Level:  Active Listener    Participation Quality: Appropriate    Speech:  normal    Thought Process/Content: Logical    Mood: euthymic    Level of consciousness:  Alert    Response to Learning: Able to verbalize/acknowledge new learning    Modes of Intervention: Education and Support    Discipline Responsible: /Counselor     Signature:  SHELBI Martines

## 2023-09-02 NOTE — H&P
HISTORY AND PHYSICAL    Taylor Lucio is a 29 y.o.  male admitted through ED for thoughts of suicide for several days. He reports that nothing made his symptoms worse but he admits to drinking alcohol. Reports that he does not drink daily. UDS is negative.     HISTORY:    Patient Active Problem List   Diagnosis    Anxiety disorder, unspecified    Bipolar affective disorder, currently depressed, moderate (HCC)    Tobacco use disorder    Bipolar I disorder, most recent episode depressed, severe without psychotic features ([address])    Depression with suicidal ideation    Bipolar disorder, current episode depressed, moderate (HCC)    Other insomnia    ETOH abuse    Bipolar affective disorder, most recent episode mixed (HCC)    Bipolar affective disorder, current episode mixed, without psychotic features ([address])       Past Medical History:   Diagnosis Date    Alcohol abuse     Bipolar 1 disorder ([address])     Post traumatic stress disorder (PTSD)        Family History   Problem Relation Age of Onset    Other Father        Social History     Socioeconomic History    Marital status: Single     Spouse name: Not on file    Number of children: 0    Years of education: Not on file    Highest education level: Not on file   Occupational History    Not on file   Tobacco Use    Smoking status: Every Day     Packs/day: 1.00     Years: 10.00     Pack years: 10.00     Types: Cigarettes    Smokeless tobacco: Never   Vaping Use    Vaping Use: Never used   Substance and Sexual Activity    Alcohol use: Yes    Drug use: Not Currently     Comment: Not since 8/2020    Sexual activity: Yes     Partners: Female   Other Topics Concern    Not on file   Social History Narrative    Not on file     Social Determinants of Health     Financial Resource Strain: High Risk    Difficulty of Paying Living Expenses: Very hard   Food Insecurity: Food Insecurity Present    Worried About Running Out of Food in the Last Year: Often true    Ran Out of

## 2023-09-02 NOTE — PLAN OF CARE
Problem: Self Harm/Suicidality  Goal: Will have no self-injury during hospital stay  Description: INTERVENTIONS:  1. Ensure constant observer at bedside with Q15M safety checks  2. Maintain a safe environment  3. Secure patient belongings  4. Ensure family/visitors adhere to safety recommendations  5. Ensure safety tray has been added to patient's diet order  6.   Every shift and PRN: Re-assess suicidal risk via Frequent Screener    9/2/2023 1055 by Jose Hall RN  Outcome: Progressing  9/2/2023 1054 by Jose Hall RN  Outcome: Progressing  Flowsheets (Taken 9/2/2023 1035)  Will have no self-injury during hospital stay:   Ensure constant observer at bedside with Q15M safety checks   Maintain a safe environment   Ensure safety tray has been added to patient's diet order     Problem: Pain  Goal: Verbalizes/displays adequate comfort level or baseline comfort level  9/2/2023 1055 by Jose Hall RN  Outcome: Progressing  9/2/2023 1054 by Jose Hall RN  Outcome: Progressing

## 2023-09-02 NOTE — ED PROVIDER NOTES
daily Take 250 mg tab with 500 mg tab for total dose of 750 mg  Qty: 30 tablet, Refills: 1      !! divalproex (DEPAKOTE ER) 500 MG extended release tablet Take 1 tablet by mouth daily Take 500 mg tab with 250 mg tab for total dose of 750 mg  Qty: 30 tablet, Refills: 1      buPROPion (WELLBUTRIN XL) 150 MG extended release tablet Take 1 tablet by mouth daily  Qty: 30 tablet, Refills: 1      traZODone (DESYREL) 50 MG tablet Take 1 tablet by mouth nightly  Qty: 30 tablet, Refills: 1      cyanocobalamin 500 MCG tablet Take 1 tablet by mouth daily  Qty: 30 tablet, Refills: 2      Vitamin D, Ergocalciferol, 43081 units CAPS Take 50,000 Units by mouth once a week for 11 doses  Qty: 11 capsule, Refills: 0      hydrOXYzine HCl (ATARAX) 25 MG tablet Take 1 tablet by mouth 3 times daily as needed for Anxiety  Qty: 90 tablet, Refills: 1       !! - Potential duplicate medications found. Please discuss with provider. ALLERGIES     Patient has no known allergies.     FAMILY HISTORY       Family History   Problem Relation Age of Onset    Other Father           SOCIAL HISTORY       Social History     Socioeconomic History    Marital status: Single     Spouse name: None    Number of children: 0    Years of education: None    Highest education level: None   Tobacco Use    Smoking status: Every Day     Packs/day: 1.00     Years: 10.00     Pack years: 10.00     Types: Cigarettes    Smokeless tobacco: Never   Vaping Use    Vaping Use: Never used   Substance and Sexual Activity    Alcohol use: Yes    Drug use: Not Currently     Comment: Not since 8/2020    Sexual activity: Yes     Partners: Female     Social Determinants of Health     Financial Resource Strain: High Risk    Difficulty of Paying Living Expenses: Very hard   Food Insecurity: Food Insecurity Present    Worried About Running Out of Food in the Last Year: Often true    Ran Out of Food in the Last Year: Often true   Transportation Needs: No Transportation Needs    Lack

## 2023-09-02 NOTE — ED NOTES
Patient urine and labs collected. Patient changed into scrubs, belongs given to security. Sitter at bedside. All ligature risk removed from room.      Nadya Reyes  09/02/23 7143

## 2023-09-03 LAB
25(OH)D3 SERPL-MCNC: 13.7 NG/ML
TSH SERPL DL<=0.005 MIU/L-ACNC: 0.58 UIU/ML (ref 0.27–4.2)
VIT B12 SERPL-MCNC: 332 PG/ML (ref 211–946)

## 2023-09-03 PROCEDURE — 1240000000 HC EMOTIONAL WELLNESS R&B

## 2023-09-03 PROCEDURE — 6370000000 HC RX 637 (ALT 250 FOR IP): Performed by: PSYCHIATRY & NEUROLOGY

## 2023-09-03 RX ADMIN — BUPROPION HYDROCHLORIDE 150 MG: 150 TABLET, EXTENDED RELEASE ORAL at 07:39

## 2023-09-03 RX ADMIN — TRAZODONE HYDROCHLORIDE 50 MG: 50 TABLET ORAL at 20:14

## 2023-09-03 RX ADMIN — DIVALPROEX SODIUM 500 MG: 500 TABLET, FILM COATED, EXTENDED RELEASE ORAL at 20:15

## 2023-09-03 NOTE — PLAN OF CARE
Problem: Self Harm/Suicidality  Goal: Will have no self-injury during hospital stay  Description: INTERVENTIONS:  1. Ensure constant observer at bedside with Q15M safety checks  2. Maintain a safe environment  3. Secure patient belongings  4. Ensure family/visitors adhere to safety recommendations  5. Ensure safety tray has been added to patient's diet order  6. Every shift and PRN: Re-assess suicidal risk via Frequent Screener    Outcome: Progressing  Flowsheets (Taken 9/3/2023 0803)  Will have no self-injury during hospital stay: Maintain a safe environment     Problem: Pain  Goal: Verbalizes/displays adequate comfort level or baseline comfort level  Outcome: Progressing     Problem: Coping  Goal: Pt/Family able to verbalize concerns and demonstrate effective coping strategies  Description: INTERVENTIONS:  1. Assist patient/family to identify coping skills, available support systems and cultural and spiritual values  2. Provide emotional support, including active listening and acknowledgement of concerns of patient and caregivers  3. Reduce environmental stimuli, as able  4. Instruct patient/family in relaxation techniques, as appropriate  5.  Assess for spiritual pain/suffering and initiate Spiritual Care, Psychosocial Clinical Specialist consults as needed  Outcome: Progressing  Flowsheets (Taken 9/3/2023 0803)  Patient/family able to verbalize anxieties, fears, and concerns, and demonstrate effective coping: Provide emotional support, including active listening and acknowledgement of concerns of patient and caregivers

## 2023-09-03 NOTE — GROUP NOTE
Group Therapy Note    Date: 9/3/2023    Group Start Time: 1300  Group End Time: 1330  Group Topic: Healthy Living/Wellness    [facility]    Adrianna Diaz RN; Concha Daniel RN              Patient's Goal:  Medication Education and Adherence          Status After Intervention:  unchanged    Participation Level: Minimal    Participation Quality: Resistant      Speech:  hesitant      Thought Process/Content:  pt left group early      Affective Functioning: Blunted      Mood: anxious      Level of consciousness:  Preoccupied      Response to Learning: Resistant      Endings: None Reported    Modes of Intervention: Education and Support      Discipline Responsible: Registered Nurse      Signature:   Adrianna Diaz RN

## 2023-09-04 VITALS
OXYGEN SATURATION: 99 % | DIASTOLIC BLOOD PRESSURE: 66 MMHG | HEART RATE: 93 BPM | SYSTOLIC BLOOD PRESSURE: 113 MMHG | HEIGHT: 71 IN | WEIGHT: 130 LBS | TEMPERATURE: 98.6 F | BODY MASS INDEX: 18.2 KG/M2 | RESPIRATION RATE: 16 BRPM

## 2023-09-04 PROCEDURE — 6370000000 HC RX 637 (ALT 250 FOR IP): Performed by: PSYCHIATRY & NEUROLOGY

## 2023-09-04 RX ORDER — MECOBALAMIN 5000 MCG
5 TABLET,DISINTEGRATING ORAL NIGHTLY
Qty: 30 TABLET | Refills: 0 | Status: SHIPPED | OUTPATIENT
Start: 2023-09-04

## 2023-09-04 RX ORDER — DIVALPROEX SODIUM 500 MG/1
500 TABLET, EXTENDED RELEASE ORAL NIGHTLY
Qty: 30 TABLET | Refills: 0 | Status: SHIPPED | OUTPATIENT
Start: 2023-09-04

## 2023-09-04 RX ORDER — ERGOCALCIFEROL 1.25 MG/1
50000 CAPSULE ORAL WEEKLY
Qty: 11 CAPSULE | Refills: 0 | Status: SHIPPED | OUTPATIENT
Start: 2023-09-04 | End: 2023-11-14

## 2023-09-04 RX ORDER — TRAZODONE HYDROCHLORIDE 50 MG/1
50 TABLET ORAL NIGHTLY PRN
Qty: 30 TABLET | Refills: 0 | Status: SHIPPED | OUTPATIENT
Start: 2023-09-04

## 2023-09-04 RX ORDER — HYDROXYZINE 50 MG/1
50 TABLET, FILM COATED ORAL 3 TIMES DAILY PRN
Qty: 90 TABLET | Refills: 0 | Status: SHIPPED | OUTPATIENT
Start: 2023-09-04 | End: 2023-10-04

## 2023-09-04 RX ORDER — BUPROPION HYDROCHLORIDE 150 MG/1
150 TABLET ORAL DAILY
Qty: 30 TABLET | Refills: 0 | Status: SHIPPED | OUTPATIENT
Start: 2023-09-05

## 2023-09-04 RX ADMIN — BUPROPION HYDROCHLORIDE 150 MG: 150 TABLET, EXTENDED RELEASE ORAL at 08:41

## 2023-09-04 NOTE — DISCHARGE SUMMARY
traZODone (DESYREL) 50 MG tablet Take 1 tablet by mouth nightly as needed for Sleep  Qty: 30 tablet, Refills: 0      Vitamin D, Ergocalciferol, 94223 units CAPS Take 50,000 Units by mouth once a week for 11 doses  Qty: 11 capsule, Refills: 0           STOP taking these medications       cyanocobalamin 500 MCG tablet Comments:   Reason for Stopping:             Activity: activity as tolerated  Diet: regular diet  Wound Care: none needed    Follow-up with [address] [place] [address] provider in 2 weeks.     Time worked: More than 31 minutes    Participation: good    Electronically signed by Bret Vo MD on 9/4/2023 at 8:46 AM

## 2023-09-04 NOTE — PLAN OF CARE
Problem: Coping  Goal: Pt/Family able to verbalize concerns and demonstrate effective coping strategies  Description: INTERVENTIONS:  1. Assist patient/family to identify coping skills, available support systems and cultural and spiritual values  2. Provide emotional support, including active listening and acknowledgement of concerns of patient and caregivers  3. Reduce environmental stimuli, as able  4. Instruct patient/family in relaxation techniques, as appropriate  5. Assess for spiritual pain/suffering and initiate Spiritual Care, Psychosocial Clinical Specialist consults as needed  Outcome: Progressing      Group Therapy Note     Date: 9/4/2023  Start Time: 1000  End Time:  8824  Number of Participants: 10     Type of Group: Psychotherapy        Status After Intervention:  Improved     Participation Level:  Active Listener     Participation Quality: Appropriate, Attentive, and Sharing        Speech:  normal        Thought Process/Content: Logical        Affective Functioning: Congruent        Mood: euthymic        Level of consciousness:  Alert        Response to Learning: Able to verbalize current knowledge/experience        Endings: None Reported     Modes of Intervention: Education, Support, and Socialization        Discipline Responsible: /Counselor        Signature:  Jasmina Johnston [facility]

## 2023-09-04 NOTE — PLAN OF CARE
Problem: Coping  Goal: Pt/Family able to verbalize concerns and demonstrate effective coping strategies  9/4/2023 1139 by Dg Powers  Outcome: Progressing    Group Therapy Note     Date: 9/4/2023  Start Time: 1100  End Time:  8293  Number of Participants: 5     Type of Group: Psychoeducation     Wellness Binder Information  Module Name:  emotional wellness  Session Number:  5     Patient's Goal:  obstacles to emotional wellness     Notes:  pt acknowledged negative thinking as an obstacle to emotional wellness.      Status After Intervention:  Improved     Participation Level: Interactive     Participation Quality: Appropriate, Attentive, and Sharing        Speech:  normal        Thought Process/Content: Logical        Affective Functioning: Congruent        Mood: congruent        Level of consciousness:  Alert, Oriented x4, and Attentive        Response to Learning: Able to verbalize current knowledge/experience        Endings: None Reported     Modes of Intervention: Education        Discipline Responsible: Psychoeducational Specialist        Signature:  Dg Powers

## 2023-09-04 NOTE — DISCHARGE INSTR - DIET

## 2023-09-04 NOTE — DISCHARGE INSTRUCTIONS
Learning About Mood Disorders  What are mood disorders? Mood disorders are medical problems that affect how you feel. They can impact your moods, thoughts, and actions. Mood disorders include:  Depression. This causes you to feel sad or hopeless for much of the time. Bipolar disorder. This causes extreme mood changes from manic episodes of very high energy to extreme lows of depression. Seasonal affective disorder (SAD). This is a type of depression that affects you during the same season each year. Most often people experience SAD during the fall and winter months when days are shorter and there is less light. What are the symptoms? Depression  You may:  Feel sad or hopeless nearly every day. Lose interest in or not get pleasure from most daily activities. You feel this way nearly every day. Have low energy, changes in your appetite, or changes in how well you sleep. Have trouble concentrating. Think about death and suicide. Bipolar disorder  Symptoms depend on your mood swings. You may:  Feel very happy, energetic, or on edge. Feel like you need very little sleep. Feel overly self-confident. Do impulsive things, such as spending a lot of money. Feel sad or hopeless. Have racing thoughts or trouble thinking and making decisions. Lose interest in things you have enjoyed in the past.  Think about death and suicide. Seasonal affective disorder (SAD)  Symptoms come and go at about the same time each year. For most people with SAD, symptoms come during the winter when there is less daylight. You may:  Feel sad, grumpy, mccarty, or anxious. Lose interest in your usual activities. Eat more and crave carbohydrates, such as bread and pasta. Gain weight. Sleep more and feel drowsy during the daytime.   Where to get help 24 hours a day, 7 days a week   If you or someone you know talks about suicide, self-harm, a mental health crisis, a substance use crisis, or any other kind of emotional distress,

## 2023-09-04 NOTE — PLAN OF CARE
Problem: Self Harm/Suicidality  Goal: Will have no self-injury during hospital stay  Description: INTERVENTIONS:  1. Ensure constant observer at bedside with Q15M safety checks  2. Maintain a safe environment  3. Secure patient belongings  4. Ensure family/visitors adhere to safety recommendations  5. Ensure safety tray has been added to patient's diet order  6. Every shift and PRN: Re-assess suicidal risk via Frequent Screener    Outcome: Adequate for Discharge     Problem: Pain  Goal: Verbalizes/displays adequate comfort level or baseline comfort level  Outcome: Adequate for Discharge     Problem: Coping  Goal: Pt/Family able to verbalize concerns and demonstrate effective coping strategies  Description: INTERVENTIONS:  1. Assist patient/family to identify coping skills, available support systems and cultural and spiritual values  2. Provide emotional support, including active listening and acknowledgement of concerns of patient and caregivers  3. Reduce environmental stimuli, as able  4. Instruct patient/family in relaxation techniques, as appropriate  5.  Assess for spiritual pain/suffering and initiate Spiritual Care, Psychosocial Clinical Specialist consults as needed  9/4/2023 1234 by Amanda Ledesma RN  Outcome: Adequate for Discharge  9/4/2023 1139 by Tate Ponce  Outcome: Progressing  9/4/2023 1134 by Esteban Leach LPC  Outcome: Progressing

## 2023-11-28 ENCOUNTER — HOSPITAL ENCOUNTER (INPATIENT)
Age: 33
LOS: 2 days | Discharge: NURSING, REHAB OR LONG-TERM CARE | DRG: 885 | End: 2023-11-30
Attending: EMERGENCY MEDICINE | Admitting: PSYCHIATRY & NEUROLOGY
Payer: MEDICAID

## 2023-11-28 DIAGNOSIS — F10.920 ACUTE ALCOHOLIC INTOXICATION WITHOUT COMPLICATION (HCC): ICD-10-CM

## 2023-11-28 DIAGNOSIS — F32.A DEPRESSION WITH SUICIDAL IDEATION: Primary | ICD-10-CM

## 2023-11-28 DIAGNOSIS — R45.851 DEPRESSION WITH SUICIDAL IDEATION: Primary | ICD-10-CM

## 2023-11-28 LAB
ALBUMIN SERPL-MCNC: 4.8 G/DL (ref 3.5–5.2)
ALP SERPL-CCNC: 78 U/L (ref 40–130)
ALT SERPL-CCNC: 18 U/L (ref 5–41)
AMPHET UR QL SCN: NEGATIVE
ANION GAP SERPL CALCULATED.3IONS-SCNC: 17 MMOL/L (ref 7–19)
APAP SERPL-MCNC: <5 UG/ML (ref 10–30)
AST SERPL-CCNC: 41 U/L (ref 5–40)
BARBITURATES UR QL SCN: NEGATIVE
BENZODIAZ UR QL SCN: NEGATIVE
BILIRUB SERPL-MCNC: <0.2 MG/DL (ref 0.2–1.2)
BUN SERPL-MCNC: 14 MG/DL (ref 6–20)
BUPRENORPHINE URINE: NEGATIVE
CALCIUM SERPL-MCNC: 8.8 MG/DL (ref 8.6–10)
CANNABINOIDS UR QL SCN: NEGATIVE
CHLORIDE SERPL-SCNC: 108 MMOL/L (ref 98–111)
CO2 SERPL-SCNC: 23 MMOL/L (ref 22–29)
COCAINE UR QL SCN: NEGATIVE
CREAT SERPL-MCNC: 0.8 MG/DL (ref 0.5–1.2)
DRUG SCREEN COMMENT UR-IMP: NORMAL
ERYTHROCYTE [DISTWIDTH] IN BLOOD BY AUTOMATED COUNT: 13 % (ref 11.5–14.5)
ETHANOLAMINE SERPL-MCNC: 241 MG/DL (ref 0–0.08)
FENTANYL SCREEN, URINE: NEGATIVE
GLUCOSE SERPL-MCNC: 92 MG/DL (ref 74–109)
HCT VFR BLD AUTO: 40.8 % (ref 42–52)
HGB BLD-MCNC: 14.4 G/DL (ref 14–18)
MCH RBC QN AUTO: 30.8 PG (ref 27–31)
MCHC RBC AUTO-ENTMCNC: 35.3 G/DL (ref 33–37)
MCV RBC AUTO: 87.2 FL (ref 80–94)
METHADONE UR QL SCN: NEGATIVE
METHAMPHETAMINE, URINE: NEGATIVE
OPIATES UR QL SCN: NEGATIVE
OXYCODONE UR QL SCN: NEGATIVE
PCP UR QL SCN: NEGATIVE
PLATELET # BLD AUTO: 264 K/UL (ref 130–400)
PMV BLD AUTO: 10.1 FL (ref 9.4–12.4)
POTASSIUM SERPL-SCNC: 3.4 MMOL/L (ref 3.5–5)
PROT SERPL-MCNC: 7.7 G/DL (ref 6.6–8.7)
RBC # BLD AUTO: 4.68 M/UL (ref 4.7–6.1)
SALICYLATES SERPL-MCNC: 0.4 MG/DL (ref 3–10)
SARS-COV-2 RDRP RESP QL NAA+PROBE: NOT DETECTED
SODIUM SERPL-SCNC: 148 MMOL/L (ref 136–145)
TRICYCLIC, URINE: NEGATIVE
WBC # BLD AUTO: 10.1 K/UL (ref 4.8–10.8)

## 2023-11-28 PROCEDURE — 6370000000 HC RX 637 (ALT 250 FOR IP): Performed by: PSYCHIATRY & NEUROLOGY

## 2023-11-28 PROCEDURE — 87635 SARS-COV-2 COVID-19 AMP PRB: CPT

## 2023-11-28 PROCEDURE — 99254 IP/OBS CNSLTJ NEW/EST MOD 60: CPT | Performed by: PSYCHIATRY & NEUROLOGY

## 2023-11-28 PROCEDURE — 80053 COMPREHEN METABOLIC PANEL: CPT

## 2023-11-28 PROCEDURE — 82077 ASSAY SPEC XCP UR&BREATH IA: CPT

## 2023-11-28 PROCEDURE — 99285 EMERGENCY DEPT VISIT HI MDM: CPT

## 2023-11-28 PROCEDURE — 85027 COMPLETE CBC AUTOMATED: CPT

## 2023-11-28 PROCEDURE — 80179 DRUG ASSAY SALICYLATE: CPT

## 2023-11-28 PROCEDURE — 80307 DRUG TEST PRSMV CHEM ANLYZR: CPT

## 2023-11-28 PROCEDURE — 6370000000 HC RX 637 (ALT 250 FOR IP): Performed by: EMERGENCY MEDICINE

## 2023-11-28 PROCEDURE — 36415 COLL VENOUS BLD VENIPUNCTURE: CPT

## 2023-11-28 PROCEDURE — 80143 DRUG ASSAY ACETAMINOPHEN: CPT

## 2023-11-28 PROCEDURE — G0480 DRUG TEST DEF 1-7 CLASSES: HCPCS

## 2023-11-28 PROCEDURE — 1240000000 HC EMOTIONAL WELLNESS R&B

## 2023-11-28 RX ORDER — HYDROXYZINE HYDROCHLORIDE 25 MG/1
25 TABLET, FILM COATED ORAL 3 TIMES DAILY PRN
Status: DISCONTINUED | OUTPATIENT
Start: 2023-11-28 | End: 2023-11-30 | Stop reason: HOSPADM

## 2023-11-28 RX ORDER — ACETAMINOPHEN 325 MG/1
650 TABLET ORAL EVERY 4 HOURS PRN
Status: DISCONTINUED | OUTPATIENT
Start: 2023-11-28 | End: 2023-11-30 | Stop reason: HOSPADM

## 2023-11-28 RX ORDER — POLYETHYLENE GLYCOL 3350 17 G/17G
17 POWDER, FOR SOLUTION ORAL DAILY PRN
Status: DISCONTINUED | OUTPATIENT
Start: 2023-11-28 | End: 2023-11-30 | Stop reason: HOSPADM

## 2023-11-28 RX ORDER — ONDANSETRON 4 MG/1
4 TABLET, ORALLY DISINTEGRATING ORAL ONCE
Status: COMPLETED | OUTPATIENT
Start: 2023-11-28 | End: 2023-11-28

## 2023-11-28 RX ORDER — TRAZODONE HYDROCHLORIDE 50 MG/1
50 TABLET ORAL NIGHTLY
Status: DISCONTINUED | OUTPATIENT
Start: 2023-11-28 | End: 2023-11-30 | Stop reason: HOSPADM

## 2023-11-28 RX ORDER — LORAZEPAM 1 MG/1
1 TABLET ORAL ONCE
Status: COMPLETED | OUTPATIENT
Start: 2023-11-28 | End: 2023-11-28

## 2023-11-28 RX ORDER — MECOBALAMIN 5000 MCG
5 TABLET,DISINTEGRATING ORAL NIGHTLY
Status: DISCONTINUED | OUTPATIENT
Start: 2023-11-28 | End: 2023-11-30 | Stop reason: HOSPADM

## 2023-11-28 RX ADMIN — Medication 5 MG: at 20:42

## 2023-11-28 RX ADMIN — LORAZEPAM 1 MG: 1 TABLET ORAL at 16:18

## 2023-11-28 RX ADMIN — ONDANSETRON 4 MG: 4 TABLET, ORALLY DISINTEGRATING ORAL at 16:18

## 2023-11-28 RX ADMIN — TRAZODONE HYDROCHLORIDE 50 MG: 50 TABLET ORAL at 20:42

## 2023-11-28 RX ADMIN — ACETAMINOPHEN 650 MG: 325 TABLET ORAL at 20:42

## 2023-11-28 SDOH — ECONOMIC STABILITY: INCOME INSECURITY

## 2023-11-28 SDOH — ECONOMIC STABILITY: FOOD INSECURITY

## 2023-11-28 ASSESSMENT — LIFESTYLE VARIABLES
HOW MANY STANDARD DRINKS CONTAINING ALCOHOL DO YOU HAVE ON A TYPICAL DAY: 7 TO 9
HOW OFTEN DO YOU HAVE A DRINK CONTAINING ALCOHOL: 4 OR MORE TIMES A WEEK

## 2023-11-28 NOTE — ED NOTES
Report called to Micaela Enrique RN on behavioral health floor. Security called for pt transport to [facility] floor.      Griselda Elder RN  11/28/23 [id number]

## 2023-11-28 NOTE — ED PROVIDER NOTES
[facility] 6 ADULT [facility]  eMERGENCY dEPARTMENT eNCOUnter      Pt Name: Ami Woodruff  MRN: [id number]  [address] [facility] 1990  Date of evaluation: 11/28/2023  Provider: Chester Ashraf MD    CHIEF COMPLAINT       Chief Complaint   Patient presents with    Suicidal     Pt presents to ED with c/o SI states that he would hang himself. HISTORY OF PRESENT ILLNESS   (Location/Symptom, Timing/Onset,Context/Setting, Quality, Duration, Modifying Factors, Severity)  Note limiting factors. Ami Woodruff is a 29 y.o. male who presents to the emergency department with suicidal ideation. HPI    Patient is a 82-year-old white male with history of bipolar disorder; posttraumatic stress disorder; substance abuse; alcohol abuse; depression; prior admissions for suicidal ideation who presents with thoughts of hanging himself since last night in the setting of having drank alcohol last night. Reports he drank a part. Presents today because he is afraid he might harm himself he is cooperative in the emergency department. He has been noncompliant with medications prescribed to him for his depression and he has bipolar disorder in part secondary to having been incarcerated for shoplifting. He is alert awake and cooperative in the ED. He denies homicidal ideations or hallucinations. NursingNotes were reviewed.     REVIEW OF SYSTEMS    (2-9 systems for level 4, 10 or more for level 5)     Review of Systems         PAST MEDICALHISTORY     Past Medical History:   Diagnosis Date    Alcohol abuse     Bipolar 1 disorder ([address])     Post traumatic stress disorder (PTSD)          SURGICAL HISTORY       Past Surgical History:   Procedure Laterality Date    OTHER SURGICAL HISTORY      cut tendon in right little finger         CURRENT MEDICATIONS     Current Discharge Medication List        CONTINUE these medications which have NOT CHANGED    Details   divalproex (DEPAKOTE ER) 500 MG extended release tablet Take 1 tablet by mouth

## 2023-11-28 NOTE — ED NOTES
Pt changed into paper scrubs, belongings bagged and labeled with pt sticker. Security called to collect pt belongings.      Avril Sidhu RN  11/28/23 [id number]

## 2023-11-28 NOTE — CONSULTS
[facility]  Psychiatry Consult    Reason for Consult: Concern   Suicidal ideations    Psychiatric consult has been requested by ER attending Michael Baig MD    The primary source(s) of information include(s):  patient    The patient is a 29 y.o. homeless and unemployed male with previous psychiatric history of bipolar disorder, stimulants use disorder, cannabis use disorder, alcohol use disorder, history of treatment noncompliance, who presented in emergency department, secondary to treatment noncompliance and suicidal ideations. Patient's blood alcohol level in ER was 241. Patient is well-known to psychiatry due to multiple previous admissions to our psychiatric unit with last admission in September 2023, when patient was admitted to our psychiatric unit with exactly the same clinical presentation, as at present time. Patient has been seen in ER in room #15 with presence of psychiatry [facility] AN AFFILIATE OF [facility] nurse  Bruna Roblero RN. He reported that after last discharge from the hospital he did not follow with his appointment and did not take any of prescribed psychotropic medications. Patient adamantly denies that he was using any drugs recently, however, reported that he continues to drink alcohol, stated that last time he was drinking a bottle of whiskey yesterday at night. However, patient's blood alcohol level in ER was 241. He reported that he woke up today in the morning and was experiencing visual images of himself, stated that he saw himself by hanging himself. Patient stated that she became afraid, if he will not stay in safe environment, he can harm himself. He reported \"I was standing on the street and was thinking what to do next and I decided to come here for help\".     In regards of affective symptomatology, patient reported depression, mild anxiety, poor motivation, poor concentration, decreased memory, decreased pleasure in previously enjoyed activities, decreased

## 2023-11-29 LAB
FLUAV AG NPH QL: NEGATIVE
FLUBV AG NPH QL: NEGATIVE
SARS-COV-2 RDRP RESP QL NAA+PROBE: NOT DETECTED

## 2023-11-29 PROCEDURE — 6370000000 HC RX 637 (ALT 250 FOR IP): Performed by: PSYCHIATRY & NEUROLOGY

## 2023-11-29 PROCEDURE — 87635 SARS-COV-2 COVID-19 AMP PRB: CPT

## 2023-11-29 PROCEDURE — 1240000000 HC EMOTIONAL WELLNESS R&B

## 2023-11-29 PROCEDURE — 99223 1ST HOSP IP/OBS HIGH 75: CPT | Performed by: PSYCHIATRY & NEUROLOGY

## 2023-11-29 PROCEDURE — 87804 INFLUENZA ASSAY W/OPTIC: CPT

## 2023-11-29 RX ORDER — BUPROPION HYDROCHLORIDE 150 MG/1
150 TABLET ORAL DAILY
Status: DISCONTINUED | OUTPATIENT
Start: 2023-11-29 | End: 2023-11-30 | Stop reason: HOSPADM

## 2023-11-29 RX ORDER — DIVALPROEX SODIUM 500 MG/1
500 TABLET, EXTENDED RELEASE ORAL NIGHTLY
Status: DISCONTINUED | OUTPATIENT
Start: 2023-11-29 | End: 2023-11-30 | Stop reason: HOSPADM

## 2023-11-29 RX ORDER — ONDANSETRON 4 MG/1
4 TABLET, ORALLY DISINTEGRATING ORAL EVERY 8 HOURS PRN
Status: DISCONTINUED | OUTPATIENT
Start: 2023-11-29 | End: 2023-11-30 | Stop reason: HOSPADM

## 2023-11-29 RX ADMIN — TRAZODONE HYDROCHLORIDE 50 MG: 50 TABLET ORAL at 20:27

## 2023-11-29 RX ADMIN — BUPROPION HYDROCHLORIDE 150 MG: 150 TABLET, FILM COATED, EXTENDED RELEASE ORAL at 11:48

## 2023-11-29 RX ADMIN — DIVALPROEX SODIUM 500 MG: 500 TABLET, FILM COATED, EXTENDED RELEASE ORAL at 20:27

## 2023-11-29 RX ADMIN — Medication 5 MG: at 20:27

## 2023-11-29 RX ADMIN — HYDROXYZINE HYDROCHLORIDE 25 MG: 25 TABLET, FILM COATED ORAL at 14:21

## 2023-11-29 SDOH — ECONOMIC STABILITY: INCOME INSECURITY: IN THE PAST 12 MONTHS, HAS THE ELECTRIC, GAS, OIL, OR WATER COMPANY THREATENED TO SHUT OFF SERVICE IN YOUR HOME?: NO

## 2023-11-29 SDOH — ECONOMIC STABILITY: INCOME INSECURITY: HOW HARD IS IT FOR YOU TO PAY FOR THE VERY BASICS LIKE FOOD, HOUSING, MEDICAL CARE, AND HEATING?: VERY HARD

## 2023-11-29 SDOH — ECONOMIC STABILITY: FOOD INSECURITY: WITHIN THE PAST 12 MONTHS, YOU WORRIED THAT YOUR FOOD WOULD RUN OUT BEFORE YOU GOT MONEY TO BUY MORE.: OFTEN TRUE

## 2023-11-29 ASSESSMENT — PATIENT HEALTH QUESTIONNAIRE - PHQ9
SUM OF ALL RESPONSES TO PHQ QUESTIONS 1-9: 3
2. FEELING DOWN, DEPRESSED OR HOPELESS: 2
SUM OF ALL RESPONSES TO PHQ QUESTIONS 1-9: 3
SUM OF ALL RESPONSES TO PHQ9 QUESTIONS 1 & 2: 3
1. LITTLE INTEREST OR PLEASURE IN DOING THINGS: 1
SUM OF ALL RESPONSES TO PHQ QUESTIONS 1-9: 3
SUM OF ALL RESPONSES TO PHQ QUESTIONS 1-9: 3

## 2023-11-29 NOTE — PLAN OF CARE
Problem: Neurosensory - Adult  Goal: Remains free of injury related to seizures activity  Outcome: Progressing       Group Therapy Note     Date: 11/29/2023  Start Time: 1100  End Time:  1130  Number of Participants: 10     Type of Group: Psychotherapy     Wellness Binder Information  Module Name:  emotional wellness  Session Number:  1     Patient's Goal:  Obstacles to emotional wellness     Notes:  Pt acknowledged negative thinking as an obstacle to emotional wellness.      Status After Intervention:  Improved     Participation Level: Interactive     Participation Quality: Appropriate, Attentive, and Sharing        Speech:  normal        Thought Process/Content: Logical        Affective Functioning: Congruent        Mood: congruent        Level of consciousness:  Alert, Oriented x4, and Attentive        Response to Learning: Able to verbalize current knowledge/experience        Endings: None Reported     Modes of Intervention: Education        Discipline Responsible: Psychoeducational Specialist        Signature:  Marissa Roldan

## 2023-11-29 NOTE — PLAN OF CARE
Problem: Neurosensory - Adult  Goal: Achieves stable or improved neurological status  Outcome: Progressing  Goal: Absence of seizures  Outcome: Progressing  Goal: Remains free of injury related to seizures activity  11/29/2023 1316 by Juanjose Ramirez RN  Outcome: Progressing  11/29/2023 1145 by Teofilo MCKEON  Outcome: Progressing  Goal: Achieves maximal functionality and self care  Outcome: Progressing     Problem: Respiratory - Adult  Goal: Achieves optimal ventilation and oxygenation  Outcome: Progressing     Problem: Cardiovascular - Adult  Goal: Maintains optimal cardiac output and hemodynamic stability  Outcome: Progressing  Goal: Absence of cardiac dysrhythmias or at baseline  Outcome: Progressing     Problem: Skin/Tissue Integrity - Adult  Goal: Skin integrity remains intact  Outcome: Progressing  Flowsheets (Taken 11/29/2023 1315)  Skin Integrity Remains Intact:   Monitor for areas of redness and/or skin breakdown   Assess vascular access sites hourly   Every 4-6 hours minimum: Change oxygen saturation probe site   Every 4-6 hours: If on nasal continuous positive airway pressure, respiratory therapy assesses nares and determine need for appliance change or resting period  Goal: Incisions, wounds, or drain sites healing without S/S of infection  Outcome: Progressing  Goal: Oral mucous membranes remain intact  Outcome: Progressing     Problem: Musculoskeletal - Adult  Goal: Return mobility to safest level of function  Outcome: Progressing  Goal: Maintain proper alignment of affected body part  Outcome: Progressing  Goal: Return ADL status to a safe level of function  Outcome: Progressing     Problem: Gastrointestinal - Adult  Goal: Minimal or absence of nausea and vomiting  Outcome: Progressing  Goal: Maintains or returns to baseline bowel function  Outcome: Progressing  Goal: Maintains adequate nutritional intake  Outcome: Progressing  Goal: Establish and maintain optimal ostomy function  Outcome:

## 2023-11-29 NOTE — PLAN OF CARE
Group Therapy Note    Date: 11/29/2023  Start Time: 1000  End Time:  3513  Number of Participants: 8    Type of Group: Psychoeducation    Wellness Binder Information  Module Name:  Payal Brooks  Session Number:  1    Group Goal for Pt: To improve knowledge of practical facts about depression    Notes:  Pt did not attend group activity. Pt was invited/encouraged. Status After Intervention:      Participation Level:     Participation Quality:       Speech:         Thought Process/Content:       Affective Functioning:       Mood:       Level of consciousness:        Response to Learning:       Endings:     Modes of Intervention:       Discipline Responsible:       Signature:  Randy Vaughn

## 2023-11-29 NOTE — PSYCHOTHERAPY
Group Therapy Note    Date: 11/29/2023  Start Time: 1330  End Time:  1400  Number of Participants: 9    Type of Group: SPIRITUALITY     Wellness Binder Information  Module Name:  Mindfulness  Session Number:      Patient's Goal:  To rest the mind    Notes:      Status After Intervention:  Improved    Participation Level:  Active Listener    Participation Quality: Appropriate, Attentive, and Sharing      Speech:  normal      Thought Process/Content:       Affective Functioning: Congruent      Mood: calm      Level of consciousness:  Oriented x4      Response to Learning: Able to verbalize current knowledge/experience and Capable of insight      Endings:     Modes of Intervention: Education, Exploration, and Activity      Discipline Responsible:       Signature:  Golden Lau MA [facility]

## 2023-11-30 VITALS
TEMPERATURE: 98.8 F | HEART RATE: 83 BPM | DIASTOLIC BLOOD PRESSURE: 58 MMHG | SYSTOLIC BLOOD PRESSURE: 113 MMHG | RESPIRATION RATE: 16 BRPM | OXYGEN SATURATION: 97 %

## 2023-11-30 LAB
25(OH)D3 SERPL-MCNC: 11.2 NG/ML
ANION GAP SERPL CALCULATED.3IONS-SCNC: 8 MMOL/L (ref 7–19)
BUN SERPL-MCNC: 14 MG/DL (ref 6–20)
CALCIUM SERPL-MCNC: 9.1 MG/DL (ref 8.6–10)
CHLORIDE SERPL-SCNC: 104 MMOL/L (ref 98–111)
CO2 SERPL-SCNC: 32 MMOL/L (ref 22–29)
CREAT SERPL-MCNC: 0.8 MG/DL (ref 0.5–1.2)
GLUCOSE SERPL-MCNC: 94 MG/DL (ref 74–109)
MAGNESIUM SERPL-MCNC: 2.1 MG/DL (ref 1.6–2.6)
POTASSIUM SERPL-SCNC: 4.1 MMOL/L (ref 3.5–5)
SODIUM SERPL-SCNC: 144 MMOL/L (ref 136–145)

## 2023-11-30 PROCEDURE — 82306 VITAMIN D 25 HYDROXY: CPT

## 2023-11-30 PROCEDURE — 99239 HOSP IP/OBS DSCHRG MGMT >30: CPT | Performed by: PSYCHIATRY & NEUROLOGY

## 2023-11-30 PROCEDURE — 6370000000 HC RX 637 (ALT 250 FOR IP): Performed by: PSYCHIATRY & NEUROLOGY

## 2023-11-30 PROCEDURE — 80048 BASIC METABOLIC PNL TOTAL CA: CPT

## 2023-11-30 PROCEDURE — 5130000000 HC BRIDGE APPOINTMENT

## 2023-11-30 PROCEDURE — 83735 ASSAY OF MAGNESIUM: CPT

## 2023-11-30 PROCEDURE — 36415 COLL VENOUS BLD VENIPUNCTURE: CPT

## 2023-11-30 RX ORDER — TRAZODONE HYDROCHLORIDE 50 MG/1
50 TABLET ORAL NIGHTLY
Qty: 30 TABLET | Refills: 0 | Status: SHIPPED | OUTPATIENT
Start: 2023-11-30

## 2023-11-30 RX ORDER — BUPROPION HYDROCHLORIDE 150 MG/1
150 TABLET ORAL DAILY
Qty: 30 TABLET | Refills: 0 | Status: SHIPPED | OUTPATIENT
Start: 2023-12-01

## 2023-11-30 RX ORDER — DIVALPROEX SODIUM 500 MG/1
500 TABLET, EXTENDED RELEASE ORAL NIGHTLY
Qty: 30 TABLET | Refills: 0 | Status: SHIPPED | OUTPATIENT
Start: 2023-11-30

## 2023-11-30 RX ORDER — MECOBALAMIN 5000 MCG
5 TABLET,DISINTEGRATING ORAL NIGHTLY
Qty: 30 TABLET | Refills: 0 | Status: SHIPPED | OUTPATIENT
Start: 2023-11-30

## 2023-11-30 RX ADMIN — BUPROPION HYDROCHLORIDE 150 MG: 150 TABLET, FILM COATED, EXTENDED RELEASE ORAL at 07:36

## 2023-11-30 NOTE — DISCHARGE INSTR - DIET
Good nutrition is important when healing from an illness, injury, or surgery. Follow any nutrition recommendations given to you during your hospital stay. If you were given an oral nutrition supplement while in the hospital, continue to take this supplement at home. You can take it with meals, in-between meals, and/or before bedtime. These supplements can be purchased at most local grocery stores, pharmacies, and chain [employer]-stores. If you have any questions about your diet or nutrition, call the hospital and ask for the dietitian. The patient has MGUS and remains asymptomatic. Await follow-up labs. Refer to Hematology if quantitative immunoglobulins increase or if other hematologic abnormalities are present. Further evaluation, treatment, and follow-up per orders, current med list, and patient instructions.

## 2023-11-30 NOTE — PLAN OF CARE
Problem: Neurosensory - Adult  Goal: Achieves stable or improved neurological status  Outcome: Adequate for Discharge  Goal: Absence of seizures  Outcome: Adequate for Discharge  Goal: Remains free of injury related to seizures activity  Outcome: Adequate for Discharge  Goal: Achieves maximal functionality and self care  Outcome: Adequate for Discharge     Problem: Respiratory - Adult  Goal: Achieves optimal ventilation and oxygenation  Outcome: Adequate for Discharge     Problem: Cardiovascular - Adult  Goal: Maintains optimal cardiac output and hemodynamic stability  Outcome: Adequate for Discharge  Goal: Absence of cardiac dysrhythmias or at baseline  Outcome: Adequate for Discharge     Problem: Skin/Tissue Integrity - Adult  Goal: Skin integrity remains intact  Outcome: Adequate for Discharge  Goal: Incisions, wounds, or drain sites healing without S/S of infection  Outcome: Adequate for Discharge  Goal: Oral mucous membranes remain intact  Outcome: Adequate for Discharge     Problem: Musculoskeletal - Adult  Goal: Return mobility to safest level of function  Outcome: Adequate for Discharge  Goal: Maintain proper alignment of affected body part  Outcome: Adequate for Discharge  Goal: Return ADL status to a safe level of function  Outcome: Adequate for Discharge     Problem: Gastrointestinal - Adult  Goal: Minimal or absence of nausea and vomiting  Outcome: Adequate for Discharge  Goal: Maintains or returns to baseline bowel function  Outcome: Adequate for Discharge  Goal: Maintains adequate nutritional intake  Outcome: Adequate for Discharge  Goal: Establish and maintain optimal ostomy function  Outcome: Adequate for Discharge     Problem: Genitourinary - Adult  Goal: Absence of urinary retention  Outcome: Adequate for Discharge     Problem: Infection - Adult  Goal: Absence of infection at discharge  Outcome: Adequate for Discharge  Goal: Absence of infection during hospitalization  Outcome: Adequate for

## 2023-11-30 NOTE — H&P
HISTORY and PHYSICAL      CHIEF COMPLAINT:  SI    Reason for Admission:  SI    History Obtained From:  Patient, chart    HISTORY OF PRESENT ILLNESS:      The patient is a 29 y.o. male who is admitted to the [address] unit with worsening mood issues. He denies any new physical complaints. He has had no c/o chest pain or SOA. He has had no abdominal pain or N/V. He has had no weakness or HA. He has had no dysuria. He denies any fevers. No new pain issues. Past Medical History:        Diagnosis Date    Alcohol abuse     Bipolar 1 disorder ([address])     Post traumatic stress disorder (PTSD)      Past Surgical History:        Procedure Laterality Date    OTHER SURGICAL HISTORY      cut tendon in right little finger         Medications Prior to Admission:    Medications Prior to Admission: divalproex (DEPAKOTE ER) 500 MG extended release tablet, Take 1 tablet by mouth nightly  buPROPion (WELLBUTRIN XL) 150 MG extended release tablet, Take 1 tablet by mouth daily  traZODone (DESYREL) 50 MG tablet, Take 1 tablet by mouth nightly as needed for Sleep  melatonin 5 MG TBDP disintegrating tablet, Take 1 tablet by mouth nightly  Vitamin D, Ergocalciferol, 84874 units CAPS, Take 50,000 Units by mouth once a week for 11 doses    Allergies:  Patient has no known allergies. Social History:   TOBACCO:   reports that he has been smoking cigarettes. He has a 10.00 pack-year smoking history. He has never used smokeless tobacco.  ETOH:   reports current alcohol use. DRUGS:   reports that he does not currently use drugs.   MARITAL STATUS:  single  OCCUPATION:  Not working  Patient currently is homeless      Family History:       Problem Relation Age of Onset    Other Father      REVIEW OF SYSTEMS:  Constitutional: neg  CV: neg  Pulmonary: neg  GI: neg  : neg  Psych: SI  Neuro: neg  Skin: neg  MusculoSkeletal: neg  HEENT: neg  Joints: neg    Vitals:  /72   Pulse 85   Temp
-Admit to [facility] adult Unit and monitor on 15 minute checks  -Adán Lesser reviewed. -Gather collateral information from family with release  -Medical monitoring to be performed by Dr. Cristela Perea and associates  -Acclimate to the unit. -Encourage participation in groups and therapeutic activities as appropriate.  -Medications: Will restart patient's home medications at previously prescribed and recommended dose, secondary to patient's treatment noncompliance.     -The risks, benefits, side effects, indications, contraindications, and adverse effects of the medications have been discussed.  -The patient has verbalized understanding and has capacity to give informed consent.  -SW help evaluating home environment.   -Discuss with treatment team.

## 2023-11-30 NOTE — PLAN OF CARE
Group Therapy Note    Date: 11/30/2023  Start Time: 1000  End Time:  1030  Number of Participants: 9    Type of Group: Psychoeducation    Wellness Binder Information  Module Name:  Women's Issues  Session Number:  4    Group Goal for Pt: To raise awareness of how thoughts influence feelings    Notes:  Pt demonstrated improved awareness of how thoughts influence feelings by actively participating in group discussion. Status After Intervention:  Unchanged    Participation Level:  Active Listener and Interactive    Participation Quality: Appropriate and Attentive      Speech:  normal      Thought Process/Content: Logical      Affective Functioning: Congruent      Mood: anxious and depressed      Level of consciousness:  Alert and Oriented x4      Response to Learning: Able to verbalize current knowledge/experience, Able to verbalize/acknowledge new learning, and Progressing to goal      Endings: None Reported    Modes of Intervention: Education      Discipline Responsible: Psychoeducational Specialist      Signature:  Abhijeet Nava

## 2023-11-30 NOTE — DISCHARGE SUMMARY
Patient ID:  Elena Magaña  [id number]  89 y.o.  1990    Admit date: 11/28/2023  Discharge date: 11/30/2023    Admitting Physician: Leon Wilson MD   Attending Physician: Leon Wilson MD  Discharge Provider: Leon Wilson MD     Admission Diagnoses: Depression with suicidal ideation [F32. A, G32.473]  Acute alcoholic intoxication without complication ([address]) [J42.118]  Suicidal ideation [R45.851]  Unspecified mood (affective) disorder ([address]) [F39]    Discharge Diagnoses: Mood disorder, unspecified  Alcohol use disorder, severe, uncomplicated  Alcohol intoxication  History of bipolar disorder  History of methamphetamine use disorder  History of cannabis use disorder  Treatment noncompliance  Suicidal ideations  Homelessness  Unemployment    Admission Condition: poor    Discharged Condition: stable    Indication for Admission: Treatment noncompliance, alcohol intoxication, suicidal ideations    HPI:   The patient is a 29 y.o. homeless and unemployed male with previous psychiatric history of bipolar disorder, stimulants use disorder, cannabis use disorder, alcohol use disorder, history of treatment noncompliance, who has been admitted to our psychiatric unit from the emergency department, secondary to treatment noncompliance and suicidal ideations. Patient's blood alcohol level in ER was 241. Patient is well-known to psychiatry due to multiple previous admissions to our psychiatric unit with same clinical presentation, as at present time. Last time patient was admitted to our psychiatric unit in September 2023 after he was released from senior living for shoplifting and presented in ER with suicidal ideations. For initial psychiatric evaluation, please, refer to my consult note, as reflected below:  \"Patient has been seen in ER in room #15 with presence of psychiatry [facility] AN AFFILIATE OF [facility] nurse  Howie Jiang RN.   He reported that after last discharge from the hospital he did not follow with his appointment and did

## 2024-03-01 ENCOUNTER — HOSPITAL ENCOUNTER (INPATIENT)
Age: 34
LOS: 4 days | Discharge: HOME | DRG: 885 | End: 2024-03-05
Attending: STUDENT IN AN ORGANIZED HEALTH CARE EDUCATION/TRAINING PROGRAM | Admitting: PSYCHIATRY & NEUROLOGY
Payer: MEDICAID

## 2024-03-01 DIAGNOSIS — F10.929 ACUTE ALCOHOLIC INTOXICATION WITH COMPLICATION (HCC): Primary | ICD-10-CM

## 2024-03-01 DIAGNOSIS — R44.3 HALLUCINATIONS: ICD-10-CM

## 2024-03-01 PROBLEM — R45.851 DEPRESSION WITH SUICIDAL IDEATION: Status: ACTIVE | Noted: 2024-03-01

## 2024-03-01 PROBLEM — F32.A DEPRESSION WITH SUICIDAL IDEATION: Status: ACTIVE | Noted: 2024-03-01

## 2024-03-01 LAB
ALBUMIN SERPL-MCNC: 4.7 G/DL (ref 3.5–5.2)
ALP SERPL-CCNC: 75 U/L (ref 40–130)
ALT SERPL-CCNC: 12 U/L (ref 5–41)
AMPHET UR QL SCN: NEGATIVE
ANION GAP SERPL CALCULATED.3IONS-SCNC: 16 MMOL/L (ref 7–19)
AST SERPL-CCNC: 31 U/L (ref 5–40)
BACTERIA URNS QL MICRO: NEGATIVE /HPF
BARBITURATES UR QL SCN: NEGATIVE
BASOPHILS # BLD: 0.1 K/UL (ref 0–0.2)
BASOPHILS NFR BLD: 0.7 % (ref 0–1)
BENZODIAZ UR QL SCN: NEGATIVE
BILIRUB SERPL-MCNC: <0.2 MG/DL (ref 0.2–1.2)
BILIRUB UR QL STRIP: NEGATIVE
BUN SERPL-MCNC: 15 MG/DL (ref 6–20)
BUPRENORPHINE URINE: NEGATIVE
CALCIUM SERPL-MCNC: 8.7 MG/DL (ref 8.6–10)
CANNABINOIDS UR QL SCN: NEGATIVE
CHLORIDE SERPL-SCNC: 103 MMOL/L (ref 98–111)
CLARITY UR: CLEAR
CO2 SERPL-SCNC: 26 MMOL/L (ref 22–29)
COCAINE UR QL SCN: NEGATIVE
COLOR UR: YELLOW
CREAT SERPL-MCNC: 0.8 MG/DL (ref 0.5–1.2)
CRYSTALS URNS MICRO: ABNORMAL /HPF
DRUG SCREEN COMMENT UR-IMP: NORMAL
EOSINOPHIL # BLD: 0 K/UL (ref 0–0.6)
EOSINOPHIL NFR BLD: 0.5 % (ref 0–5)
EPI CELLS #/AREA URNS AUTO: 1 /HPF (ref 0–5)
ERYTHROCYTE [DISTWIDTH] IN BLOOD BY AUTOMATED COUNT: 12.8 % (ref 11.5–14.5)
ETHANOLAMINE SERPL-MCNC: 236 MG/DL (ref 0–0.08)
ETHANOLAMINE SERPL-MCNC: 43 MG/DL (ref 0–0.08)
FENTANYL SCREEN, URINE: NEGATIVE
GLUCOSE SERPL-MCNC: 109 MG/DL (ref 74–109)
GLUCOSE UR STRIP.AUTO-MCNC: NEGATIVE MG/DL
HCT VFR BLD AUTO: 42.1 % (ref 42–52)
HGB BLD-MCNC: 14.2 G/DL (ref 14–18)
HGB UR STRIP.AUTO-MCNC: NEGATIVE MG/L
HYALINE CASTS #/AREA URNS AUTO: 8 /HPF (ref 0–8)
IMM GRANULOCYTES # BLD: 0 K/UL
KETONES UR STRIP.AUTO-MCNC: 15 MG/DL
LEUKOCYTE ESTERASE UR QL STRIP.AUTO: NEGATIVE
LYMPHOCYTES # BLD: 2.1 K/UL (ref 1.1–4.5)
LYMPHOCYTES NFR BLD: 25.3 % (ref 20–40)
MCH RBC QN AUTO: 29.3 PG (ref 27–31)
MCHC RBC AUTO-ENTMCNC: 33.7 G/DL (ref 33–37)
MCV RBC AUTO: 87 FL (ref 80–94)
METHADONE UR QL SCN: NEGATIVE
METHAMPHETAMINE, URINE: NEGATIVE
MONOCYTES # BLD: 0.3 K/UL (ref 0–0.9)
MONOCYTES NFR BLD: 3.8 % (ref 0–10)
NEUTROPHILS # BLD: 5.7 K/UL (ref 1.5–7.5)
NEUTS SEG NFR BLD: 69.5 % (ref 50–65)
NITRITE UR QL STRIP.AUTO: NEGATIVE
OPIATES UR QL SCN: NEGATIVE
OXYCODONE UR QL SCN: NEGATIVE
PCP UR QL SCN: NEGATIVE
PH UR STRIP.AUTO: 6 [PH] (ref 5–8)
PLATELET # BLD AUTO: 299 K/UL (ref 130–400)
PMV BLD AUTO: 10 FL (ref 9.4–12.4)
POTASSIUM SERPL-SCNC: 3.7 MMOL/L (ref 3.5–5)
PROT SERPL-MCNC: 7.8 G/DL (ref 6.6–8.7)
PROT UR STRIP.AUTO-MCNC: 30 MG/DL
RBC # BLD AUTO: 4.84 M/UL (ref 4.7–6.1)
RBC #/AREA URNS AUTO: 1 /HPF (ref 0–4)
SARS-COV-2 RDRP RESP QL NAA+PROBE: NOT DETECTED
SODIUM SERPL-SCNC: 145 MMOL/L (ref 136–145)
SP GR UR STRIP.AUTO: 1.01 (ref 1–1.03)
TRICYCLIC, URINE: NEGATIVE
UROBILINOGEN UR STRIP.AUTO-MCNC: 0.2 E.U./DL
WBC # BLD AUTO: 8.2 K/UL (ref 4.8–10.8)
WBC #/AREA URNS AUTO: 1 /HPF (ref 0–5)

## 2024-03-01 PROCEDURE — 6370000000 HC RX 637 (ALT 250 FOR IP): Performed by: PSYCHIATRY & NEUROLOGY

## 2024-03-01 PROCEDURE — 82077 ASSAY SPEC XCP UR&BREATH IA: CPT

## 2024-03-01 PROCEDURE — 85025 COMPLETE CBC W/AUTO DIFF WBC: CPT

## 2024-03-01 PROCEDURE — 1240000000 HC EMOTIONAL WELLNESS R&B

## 2024-03-01 PROCEDURE — 36415 COLL VENOUS BLD VENIPUNCTURE: CPT

## 2024-03-01 PROCEDURE — 80053 COMPREHEN METABOLIC PANEL: CPT

## 2024-03-01 PROCEDURE — 80307 DRUG TEST PRSMV CHEM ANLYZR: CPT

## 2024-03-01 PROCEDURE — 81001 URINALYSIS AUTO W/SCOPE: CPT

## 2024-03-01 PROCEDURE — G0480 DRUG TEST DEF 1-7 CLASSES: HCPCS

## 2024-03-01 PROCEDURE — 87635 SARS-COV-2 COVID-19 AMP PRB: CPT

## 2024-03-01 PROCEDURE — 99285 EMERGENCY DEPT VISIT HI MDM: CPT

## 2024-03-01 RX ORDER — POLYETHYLENE GLYCOL 3350 17 G
2 POWDER IN PACKET (EA) ORAL
Status: DISCONTINUED | OUTPATIENT
Start: 2024-03-01 | End: 2024-03-05 | Stop reason: HOSPADM

## 2024-03-01 RX ORDER — TRAZODONE HYDROCHLORIDE 50 MG/1
50 TABLET ORAL NIGHTLY
Status: DISCONTINUED | OUTPATIENT
Start: 2024-03-01 | End: 2024-03-05 | Stop reason: HOSPADM

## 2024-03-01 RX ORDER — NICOTINE 21 MG/24HR
1 PATCH, TRANSDERMAL 24 HOURS TRANSDERMAL DAILY
Status: DISCONTINUED | OUTPATIENT
Start: 2024-03-01 | End: 2024-03-05 | Stop reason: HOSPADM

## 2024-03-01 RX ORDER — MECOBALAMIN 5000 MCG
5 TABLET,DISINTEGRATING ORAL NIGHTLY
Status: DISCONTINUED | OUTPATIENT
Start: 2024-03-01 | End: 2024-03-05 | Stop reason: HOSPADM

## 2024-03-01 RX ORDER — HYDROXYZINE HYDROCHLORIDE 25 MG/1
25 TABLET, FILM COATED ORAL 3 TIMES DAILY PRN
Status: DISCONTINUED | OUTPATIENT
Start: 2024-03-01 | End: 2024-03-05 | Stop reason: HOSPADM

## 2024-03-01 RX ORDER — ACETAMINOPHEN 325 MG/1
650 TABLET ORAL EVERY 4 HOURS PRN
Status: DISCONTINUED | OUTPATIENT
Start: 2024-03-01 | End: 2024-03-05 | Stop reason: HOSPADM

## 2024-03-01 RX ORDER — POLYETHYLENE GLYCOL 3350 17 G/17G
17 POWDER, FOR SOLUTION ORAL DAILY PRN
Status: DISCONTINUED | OUTPATIENT
Start: 2024-03-01 | End: 2024-03-05 | Stop reason: HOSPADM

## 2024-03-01 RX ORDER — RISPERIDONE 1 MG/1
1 TABLET ORAL ONCE
Status: COMPLETED | OUTPATIENT
Start: 2024-03-01 | End: 2024-03-01

## 2024-03-01 RX ADMIN — TRAZODONE HYDROCHLORIDE 50 MG: 50 TABLET ORAL at 20:55

## 2024-03-01 RX ADMIN — Medication 5 MG: at 20:55

## 2024-03-01 RX ADMIN — RISPERIDONE 1 MG: 1 TABLET, FILM COATED ORAL at 15:09

## 2024-03-01 RX ADMIN — HYDROXYZINE HYDROCHLORIDE 25 MG: 25 TABLET, FILM COATED ORAL at 20:55

## 2024-03-01 SDOH — ECONOMIC STABILITY: INCOME INSECURITY: IN THE PAST 12 MONTHS, HAS THE ELECTRIC, GAS, OIL, OR WATER COMPANY THREATENED TO SHUT OFF SERVICE IN YOUR HOME?: NO

## 2024-03-01 SDOH — ECONOMIC STABILITY: INCOME INSECURITY: HOW HARD IS IT FOR YOU TO PAY FOR THE VERY BASICS LIKE FOOD, HOUSING, MEDICAL CARE, AND HEATING?: SOMEWHAT HARD

## 2024-03-01 SDOH — ECONOMIC STABILITY: FOOD INSECURITY: WITHIN THE PAST 12 MONTHS, YOU WORRIED THAT YOUR FOOD WOULD RUN OUT BEFORE YOU GOT MONEY TO BUY MORE.: SOMETIMES TRUE

## 2024-03-01 ASSESSMENT — PATIENT HEALTH QUESTIONNAIRE - PHQ9
2. FEELING DOWN, DEPRESSED OR HOPELESS: 1
SUM OF ALL RESPONSES TO PHQ QUESTIONS 1-9: 2
SUM OF ALL RESPONSES TO PHQ9 QUESTIONS 1 & 2: 2
SUM OF ALL RESPONSES TO PHQ QUESTIONS 1-9: 2
1. LITTLE INTEREST OR PLEASURE IN DOING THINGS: 1
SUM OF ALL RESPONSES TO PHQ QUESTIONS 1-9: 2
SUM OF ALL RESPONSES TO PHQ QUESTIONS 1-9: 2

## 2024-03-01 ASSESSMENT — SLEEP AND FATIGUE QUESTIONNAIRES
DO YOU HAVE DIFFICULTY SLEEPING: YES
AVERAGE NUMBER OF SLEEP HOURS: 4
DO YOU USE A SLEEP AID: NO

## 2024-03-01 ASSESSMENT — PAIN - FUNCTIONAL ASSESSMENT: PAIN_FUNCTIONAL_ASSESSMENT: NONE - DENIES PAIN

## 2024-03-01 NOTE — PLAN OF CARE
Problem: Safety - Adult  Goal: Free from fall injury  Outcome: Progressing     Problem: Self Harm/Suicidality  Goal: Will have no self-injury during hospital stay  Description: INTERVENTIONS:  1.  Ensure constant observer at bedside with Q15M safety checks  2.  Maintain a safe environment  3.  Secure patient belongings  4.  Ensure family/visitors adhere to safety recommendations  5.  Ensure safety tray has been added to patient's diet order  6.  Every shift and PRN: Re-assess suicidal risk via Frequent Screener    Outcome: Progressing     Problem: Depression  Goal: Will be euthymic at discharge  Description: INTERVENTIONS:  1. Administer medication as ordered  2. Provide emotional support via 1:1 interaction with staff  3. Encourage involvement in milieu/groups/activities  4. Monitor for social isolation  Outcome: Progressing     Problem: Psychosis  Goal: Will report no hallucinations or delusions  Description: INTERVENTIONS:  1. Administer medication as  ordered  2. Assist with reality testing to support increasing orientation  3. Assess if patient's hallucinations or delusions are encouraging self harm or harm to others and intervene as appropriate  Outcome: Progressing     Problem: Anxiety  Goal: Will report anxiety at manageable levels  Description: INTERVENTIONS:  1. Administer medication as ordered  2. Teach and rehearse alternative coping skills  3. Provide emotional support with 1:1 interaction with staff  Outcome: Progressing     Problem: Drug Abuse/Detox  Goal: Will have no detox symptoms and will verbalize plan for changing drug-related behavior  Description: INTERVENTIONS:  1. Administer medication as ordered  2. Monitor physical status  3. Provide emotional support with 1:1 interaction with staff  4. Encourage  recovery focused treatment   Outcome: Progressing

## 2024-03-01 NOTE — ED NOTES
Pt dressed out into maroon scrubs, all of pts personal belongings bagged and given to security. Labs, Covid, and urine sent to lab. All ligature risks removed from pts room, sitter at bedside.

## 2024-03-01 NOTE — ED PROVIDER NOTES
[facility] EMERGENCY DEPT  EMERGENCY DEPARTMENT ENCOUNTER      Pt Name: Johnnie Tompkins  MRN: [id number]  Birthdate 1990  Date of evaluation: 3/1/2024  Provider: Wang Hurley Jr, MD    CHIEF COMPLAINT       Chief Complaint   Patient presents with    Hallucinations     Visual and auditory. \"See a whole bunch of people\". SI. No plan.          PHYSICAL EXAM    (up to 7 for level 4, 8 or more for level 5)     ED Triage Vitals   BP Temp Temp src Pulse Respirations SpO2 Height Weight - Scale   03/01/24 0433 03/01/24 0433 -- 03/01/24 0433 03/01/24 0433 03/01/24 0433 -- 03/01/24 0424   121/84 99.1 °F (37.3 °C)  (!) 105 16 99 %  62.6 kg (138 lb)       Physical Exam    DIAGNOSTIC RESULTS     EKG: All EKG's are interpreted by the Emergency Department Physician who either signs or Co-signs this chart in the absence of a cardiologist.        RADIOLOGY:   Non-plain film images such as CT, Ultrasound and MRI are read by the radiologist. Plain radiographicimages are visualized and preliminarily interpreted by the emergency physician with the below findings:        No orders to display           LABS:  Labs Reviewed   CBC WITH AUTO DIFFERENTIAL - Abnormal; Notable for the following components:       Result Value    Neutrophils % 69.5 (*)     All other components within normal limits   URINALYSIS WITH REFLEX TO CULTURE - Abnormal; Notable for the following components:    Ketones, Urine 15 (*)     Protein, UA 30 (*)     All other components within normal limits   MICROSCOPIC URINALYSIS - Abnormal; Notable for the following components:    Bacteria, UA Negative (*)     Crystals, UA NEG (*)     All other components within normal limits   COVID-19, RAPID   COMPREHENSIVE METABOLIC PANEL W/ REFLEX TO MG FOR LOW K   ETHANOL   DRUG SCRN, BUPRENORPHINE   ETHANOL       All other labs were within normal range or not returned as of this dictation.    EMERGENCY DEPARTMENT COURSE and DIFFERENTIALDIAGNOSIS/MDM:   Vitals:    Vitals:    03/01/24 0424 
  Resp:  16   Temp:  99.1 °F (37.3 °C)   SpO2:  99%   Weight: 62.6 kg (138 lb)        MDM      Reassessment    Patient is a 33 y.o. male presenting with hallucinations, suicidal ideation.    Screening labs obtained as detailed above.  CBC, CMP unremarkable.  Ethanol elevated at 236.  UDS is negative    Patient currently pending repeat ethanol level for SILVINO evaluation.      Patient signed out to my colleague, Dr. Hurley. See ED handoff for further course and disposition.      CONSULTS:  None    :  Unless otherwise noted below, none     Procedures    FINAL IMPRESSION      1. Acute alcoholic intoxication with complication (HCC)    2. Hallucinations          DISPOSITION/PLAN   DISPOSITION         (Please note that portions of this note were completed with a voice recognition program.  Efforts were made to edit thedictations but occasionally words are mis-transcribed.)    Chacha Maldonado MD (electronically signed)Emergency Physician          Chacha Maldonado MD  03/01/24 0652

## 2024-03-02 LAB
25(OH)D3 SERPL-MCNC: 9.1 NG/ML
HBA1C MFR BLD: 4.6 % (ref 4–6)
TSH SERPL DL<=0.005 MIU/L-ACNC: 2.56 UIU/ML (ref 0.35–5.5)
VALPROATE SERPL-MCNC: <2.8 UG/ML (ref 50–100)
VIT B12 SERPL-MCNC: 288 PG/ML (ref 211–946)

## 2024-03-02 PROCEDURE — 6370000000 HC RX 637 (ALT 250 FOR IP): Performed by: PSYCHIATRY & NEUROLOGY

## 2024-03-02 PROCEDURE — 6370000000 HC RX 637 (ALT 250 FOR IP): Performed by: FAMILY MEDICINE

## 2024-03-02 PROCEDURE — 82607 VITAMIN B-12: CPT

## 2024-03-02 PROCEDURE — 1240000000 HC EMOTIONAL WELLNESS R&B

## 2024-03-02 PROCEDURE — 83036 HEMOGLOBIN GLYCOSYLATED A1C: CPT

## 2024-03-02 PROCEDURE — 82306 VITAMIN D 25 HYDROXY: CPT

## 2024-03-02 PROCEDURE — 80164 ASSAY DIPROPYLACETIC ACD TOT: CPT

## 2024-03-02 PROCEDURE — 99232 SBSQ HOSP IP/OBS MODERATE 35: CPT | Performed by: PSYCHIATRY & NEUROLOGY

## 2024-03-02 PROCEDURE — 36415 COLL VENOUS BLD VENIPUNCTURE: CPT

## 2024-03-02 PROCEDURE — 84443 ASSAY THYROID STIM HORMONE: CPT

## 2024-03-02 RX ORDER — FOLIC ACID 1 MG/1
1 TABLET ORAL DAILY
Status: DISCONTINUED | OUTPATIENT
Start: 2024-03-02 | End: 2024-03-05 | Stop reason: HOSPADM

## 2024-03-02 RX ORDER — CHOLECALCIFEROL (VITAMIN D3) 125 MCG
500 CAPSULE ORAL DAILY
Status: DISCONTINUED | OUTPATIENT
Start: 2024-03-02 | End: 2024-03-05 | Stop reason: HOSPADM

## 2024-03-02 RX ORDER — ERGOCALCIFEROL 1.25 MG/1
50000 CAPSULE ORAL WEEKLY
Status: DISCONTINUED | OUTPATIENT
Start: 2024-03-02 | End: 2024-03-05 | Stop reason: HOSPADM

## 2024-03-02 RX ORDER — GAUZE BANDAGE 2" X 2"
100 BANDAGE TOPICAL DAILY
Status: DISCONTINUED | OUTPATIENT
Start: 2024-03-02 | End: 2024-03-05 | Stop reason: HOSPADM

## 2024-03-02 RX ADMIN — FOLIC ACID 1 MG: 1 TABLET ORAL at 15:41

## 2024-03-02 RX ADMIN — Medication 100 MG: at 15:40

## 2024-03-02 RX ADMIN — CYANOCOBALAMIN TAB 500 MCG 500 MCG: 500 TAB at 13:36

## 2024-03-02 RX ADMIN — ERGOCALCIFEROL 50000 UNITS: 1.25 CAPSULE ORAL at 13:36

## 2024-03-02 RX ADMIN — TRAZODONE HYDROCHLORIDE 50 MG: 50 TABLET ORAL at 21:00

## 2024-03-02 RX ADMIN — Medication 5 MG: at 21:00

## 2024-03-02 NOTE — RESEARCH
SW attempted to have pt put down someone on a release that knows him relatively well, to be able to obtain collateral information about the pt, but he said he did not have anyone at this time.

## 2024-03-02 NOTE — H&P
(36.8 °C) (Tympanic)   Resp 18   Ht 1.803 m (5' 10.98\")   Wt 62.6 kg (138 lb)   SpO2 97%   BMI 19.26 kg/m²     PHYSICAL EXAM:  Gen: NAD, alert  HEENT: WNL  Lymph: no LAD  Neck: no JVD or masses  Chest: CTA bilat  CV: RRR  Abdomen: NT/ND  Extrem: no C/C/E  Neuro: non focal  Skin: no rashes  Joints: no redness    DATA:  I have reviewed the admission labs and imaging tests.    ASSESSMENT AND PLAN:      Principal Problem:    Psychosis---follow with Psych              Sumeet Bills MD  10:52 AM 3/2/2024  
S2.  PULMONARY: Clear to auscultation bilaterally, no tenderness to palpation.  ABDOMEN: Soft, nontender, nondistended.   MUSCULOSKELTAL: No obvious deformities, clubbing, cyanosis or edema, no spinous process or paraspinous tenderness, normal ROM, distal pulses intact symmetric 2+ bilaterally.   NEUROLOGICAL: Alert, oriented x 3, CN II-XII grossly intact, motor strength 5/5 all muscle groups, DTR 2+ intact and symmetric, sensation intact to sharp and dull. No abnormal movements or tremors.   SKIN: Warm, dry, intact, no rash, abrasions bruises     Vitals:  /78   Pulse 69   Temp 98.2 °F (36.8 °C) (Tympanic)   Resp 18   Ht 1.803 m (5' 10.98\")   Wt 62.6 kg (138 lb)   SpO2 97%   BMI 19.26 kg/m²     Mental Status Examination:    Appearance: Stated age.  Disheveled.  Multiple tattoos on his body.  Gait stable.  No abnormal movements or tremor.  Behavior: Calm and cooperative  Speech: Normal in tone, volume, and quality. No slurring, dysarthria or pressured speech noted.   Mood: \"I am doing better\"   Affect: Mood congruent.   Thought Process: Appears linear.  Thought Content: Denies suicidal and homicidal ideation.  No overt delusions or paranoia appreciated.   Perceptions: Denies auditory or visual hallucinations at present time. Not responding to internal stimuli.   Concentration: Intact.   Orientation: to person, place, date, and situation.   Language: Intact.   Fund of information: Intact.   Memory: Recent and remote appear intact.   Neurovegitative: Poor appetite and sleep.   Insight: Poor  Judgment: Poor    DATA:  Lab Results   Component Value Date    WBC 8.2 03/01/2024    HGB 14.2 03/01/2024    HCT 42.1 03/01/2024    MCV 87.0 03/01/2024     03/01/2024     Lab Results   Component Value Date     03/01/2024    K 3.7 03/01/2024     03/01/2024    CO2 26 03/01/2024    BUN 15 03/01/2024    CREATININE 0.8 03/01/2024    GLUCOSE 109 03/01/2024    CALCIUM 8.7 03/01/2024    PROT 7.8 03/01/2024

## 2024-03-02 NOTE — PLAN OF CARE
Problem: Self Harm/Suicidality  Goal: Will have no self-injury during hospital stay  Description: INTERVENTIONS:  1.  Ensure constant observer at bedside with Q15M safety checks  2.  Maintain a safe environment  3.  Secure patient belongings  4.  Ensure family/visitors adhere to safety recommendations  5.  Ensure safety tray has been added to patient's diet order  6.  Every shift and PRN: Re-assess suicidal risk via Frequent Screener    Outcome: Progressing     Problem: Depression  Goal: Will be euthymic at discharge  Description: INTERVENTIONS:  1. Administer medication as ordered  2. Provide emotional support via 1:1 interaction with staff  3. Encourage involvement in milieu/groups/activities  4. Monitor for social isolation  Outcome: Progressing     Problem: Psychosis  Goal: Will report no hallucinations or delusions  Description: INTERVENTIONS:  1. Administer medication as  ordered  2. Assist with reality testing to support increasing orientation  3. Assess if patient's hallucinations or delusions are encouraging self harm or harm to others and intervene as appropriate  Outcome: Progressing     Problem: Anxiety  Goal: Will report anxiety at manageable levels  Description: INTERVENTIONS:  1. Administer medication as ordered  2. Teach and rehearse alternative coping skills  3. Provide emotional support with 1:1 interaction with staff  Outcome: Progressing

## 2024-03-03 PROCEDURE — 1240000000 HC EMOTIONAL WELLNESS R&B

## 2024-03-03 PROCEDURE — 6370000000 HC RX 637 (ALT 250 FOR IP): Performed by: FAMILY MEDICINE

## 2024-03-03 PROCEDURE — 6370000000 HC RX 637 (ALT 250 FOR IP): Performed by: PSYCHIATRY & NEUROLOGY

## 2024-03-03 RX ORDER — BUPROPION HYDROCHLORIDE 150 MG/1
150 TABLET ORAL DAILY
Status: DISCONTINUED | OUTPATIENT
Start: 2024-03-03 | End: 2024-03-05 | Stop reason: HOSPADM

## 2024-03-03 RX ADMIN — Medication 100 MG: at 07:41

## 2024-03-03 RX ADMIN — BUPROPION HYDROCHLORIDE 150 MG: 150 TABLET, EXTENDED RELEASE ORAL at 15:11

## 2024-03-03 RX ADMIN — FOLIC ACID 1 MG: 1 TABLET ORAL at 07:41

## 2024-03-03 RX ADMIN — TRAZODONE HYDROCHLORIDE 50 MG: 50 TABLET ORAL at 20:52

## 2024-03-03 RX ADMIN — Medication 5 MG: at 20:52

## 2024-03-03 RX ADMIN — CYANOCOBALAMIN TAB 500 MCG 500 MCG: 500 TAB at 07:41

## 2024-03-03 NOTE — PLAN OF CARE
Group Note    Date: 03/03/24  Start Time: 2:15 PM   End Time:3:15 PM     Number of Participants: 6    Type of Group: Activity     Patient's Goal: Motivating patient to interact and express different emotions in group.     Notes: Encouraged patient to participate and discuss with group members     Participation Level: Active Listener and Interactive    Participation Quality: Appropriate    Speech:  mute    Thought Process/Content: Logical    Mood:  calm    Level of consciousness:  Alert    Response to Learning: Able to retain information    Modes of Intervention: Education and Support    Discipline Responsible: /Counselor     Signature:  SHELBI Hogue

## 2024-03-03 NOTE — BH NOTE
[facility] Adult Unit Daily Assessment  Nursing Progress Note    Room: [facility]/604-01   Name: Johnnie Tompkins   Age: 33 y.o.   Gender: male   Dx: Depression with suicidal ideation  Precautions: close watch and suicide risk  Inpatient Status: voluntary     SLEEP:  Sleep Quality Fair  Sleep Medications: Yes   PRN Sleep Meds: Yes     MEDICAL:  Other PRN Meds: Yes   Med Compliant: Yes  Accu-Chek: No  Oxygen/CPAP/BiPAP: No  CIWA/CINA: No   PAIN Assessment: none  Side Effects from medication: No    Metabolic Screening:  Lab Results   Component Value Date    LABA1C 4.6 03/02/2024     Lab Results   Component Value Date    CHOL 116 (L) 02/07/2022     Lab Results   Component Value Date    TRIG 98 02/07/2022     Lab Results   Component Value Date    HDL 52 (L) 02/07/2022     No components found for: \"LDLCAL\"  No components found for: \"LABVLDL\"  Body mass index is 19.26 kg/m².  BP Readings from Last 2 Encounters:   03/02/24 103/78   11/30/23 (!) 113/58       Medical Bed:   Is patient in a medical bed? NA   If medical bed is in use, has nursing secured room while patient is awake and out of the room? NA  Has safety checks by nursing been completed on the bed/room this shift? NA    Protective Factors:  Patient identifies protective factors with nursing staff as follows:   Identifies reasons for living: No   Supportive Social Network or family: No    Belief that suicide is immoral/high spirituality: Yes   Responsibility to family or others/living with family: No   Fear of death or dying due to pain and suffering: Yes   Engaged in work or school: No   If Patient is unable to identify, reason why?     Depression: 10   Anxiety: 9   SI passive   Risk of Suicide: No Risk  HI Negative for homicidal ideation        AVH:yes If Hallucinations are present, describe?     Appetite: no change from normal   Percent Meals: 75%   Social: No   Speech: pressured and hesitant   Appearance: appropriately dressed, disheveled, poor hygiene, and healthy 
looking    GROUP:  Group Participation: Yes  Participation Quality: Active Listener and Interactive    Notes: The patient is less isolative today. He denies any depression or anxiety, though this may be somewhat incongruent. He is med compliant and attends group. He performs ADLs and has a good appetite.         Electronically signed by Max Villafuerte RN on 3/3/24 at 10:24 AM CST

## 2024-03-04 PROCEDURE — 6370000000 HC RX 637 (ALT 250 FOR IP): Performed by: FAMILY MEDICINE

## 2024-03-04 PROCEDURE — 6370000000 HC RX 637 (ALT 250 FOR IP): Performed by: PSYCHIATRY & NEUROLOGY

## 2024-03-04 PROCEDURE — 99232 SBSQ HOSP IP/OBS MODERATE 35: CPT | Performed by: PSYCHIATRY & NEUROLOGY

## 2024-03-04 PROCEDURE — 1240000000 HC EMOTIONAL WELLNESS R&B

## 2024-03-04 RX ADMIN — Medication 5 MG: at 20:54

## 2024-03-04 RX ADMIN — CYANOCOBALAMIN TAB 500 MCG 500 MCG: 500 TAB at 08:21

## 2024-03-04 RX ADMIN — FOLIC ACID 1 MG: 1 TABLET ORAL at 08:21

## 2024-03-04 RX ADMIN — BUPROPION HYDROCHLORIDE 150 MG: 150 TABLET, EXTENDED RELEASE ORAL at 08:21

## 2024-03-04 RX ADMIN — Medication 100 MG: at 08:23

## 2024-03-04 RX ADMIN — TRAZODONE HYDROCHLORIDE 50 MG: 50 TABLET ORAL at 20:54

## 2024-03-04 NOTE — PSYCHOTHERAPY
Group Therapy Note    Date: 3/4/2024  Start Time: 1330  End Time:  1400  Number of Participants: 7    Type of Group: SPIRITUALITY     Wellness Binder Information  Module Name:  Mindfulness  Session Number:      Patient's Goal:  To rest the mind    Notes:      Status After Intervention:  Improved    Participation Level: Active Listener and Interactive    Participation Quality: Appropriate, Attentive, and Sharing      Speech:  normal      Thought Process/Content:       Affective Functioning: Congruent      Mood: Calm      Level of consciousness:  Attentive      Response to Learning: Able to verbalize/acknowledge new learning and Capable of insight      Endings:     Modes of Intervention: Education and Activity      Discipline Responsible:       Signature:  Omar Patrick MA BCC

## 2024-03-04 NOTE — PLAN OF CARE
Problem: Coping  Goal: Pt/Family able to verbalize concerns and demonstrate effective coping strategies  Description: INTERVENTIONS:  1. Assist patient/family to identify coping skills, available support systems and cultural and spiritual values  2. Provide emotional support, including active listening and acknowledgement of concerns of patient and caregivers  3. Reduce environmental stimuli, as able  4. Instruct patient/family in relaxation techniques, as appropriate  5. Assess for spiritual pain/suffering and initiate Spiritual Care, Psychosocial Clinical Specialist consults as needed  Outcome: Progressing  Note:                                                                     Group Therapy Note    Date: 3/4/2024  Start Time: 1000  End Time:  1030  Number of Participants: 8    Type of Group: Psychoeducation    Wellness Binder Information  Module Name:  Relapse Prevention  Session Number:  5    Group Goal for Pt:  To improve knowledge of relapse prevention strategies    Notes:  Pt demonstrated improved knowledge of relapse prevention strategies by actively participating in group discussion.    Status After Intervention:  Unchanged    Participation Level: Active Listener    Participation Quality: Appropriate      Speech:  normal      Thought Process/Content: Logical      Affective Functioning: Congruent      Mood: anxious and depressed      Level of consciousness:  Alert and Oriented x4      Response to Learning: Able to verbalize current knowledge/experience, Able to verbalize/acknowledge new learning, and Progressing to goal      Endings: None Reported    Modes of Intervention: Education      Discipline Responsible: Psychoeducational Specialist      Signature:  Grace Kwong

## 2024-03-04 NOTE — DISCHARGE INSTRUCTIONS
[facility]  Disability Resources*      Disability Services  [facility]   [address] [place] [address] [id number] [phone]  Aging and Disability Resource Market, Elder Abuse Prevention Awareness, Family Caregiver Support Program, [place] Caregiver Support Program, [facility], [facility] Community Services Employment Program, State Health Insurance Program, [place] Homecare Program, Consumer-Directed Options    [facility]  [address] [place] [address], [facility] [place] [address]  [phone] -or- [phone]  Advocacy, education, and resources for individuals with intellectual and developmental disabilities    [facility]  [address] [place] [address] [phone]  Support for Development Disabled    [facility]  [address] [place] [address]  [phone] -or- [phone]  [facility] provides care for seniors with health issues, the frail elderly, and the disabled of all ages in and around [place], [place]. SITTERS: who provide supervision and limited assistance in hospitals and nursing homes. This service is also available in assisted living facilities and private homes-but only at night in these settings. HOMEMAKERS: who provide basic housekeeping services, cooking, shopping, and errands. PERSONAL HELPERS: who assist with bathing, dressing, transfers, mobility, and incontinence care.    [facility]   [address] [place] [address]  [phone]  [employer] is a 501(c)(3) nonprofit organization whose mission is to help individuals grow and develop through recreational activities with horses. It is our mission to provide individuals with disabilities the opportunity to grow and develop through therapeutic, educational and recreational activities, while on a horse. Our state of the art facility sits on 22 acres of beautifully groomed pastures. The rolling

## 2024-03-04 NOTE — PLAN OF CARE
Problem: Coping  Goal: Pt/Family able to verbalize concerns and demonstrate effective coping strategies  3/4/2024 1133 by Eliane Edwards  Outcome: Progressing       Group Therapy Note     Date: 3/4/2024  Start Time: 1100  End Time:  1130  Number of Participants: 7     Type of Group: Psychoeducation     Wellness Binder Information  Module Name:  reducing relapse  Session Number:  2     Patient's Goal:  early warning signs     Notes:  pt acknowledged early warning signs to help reduce relapse.     Status After Intervention:  Improved     Participation Level: Interactive     Participation Quality: Appropriate, Attentive, and Sharing        Speech:  normal        Thought Process/Content: Logical        Affective Functioning: Congruent        Mood: congruent        Level of consciousness:  Alert, Oriented x4, and Attentive        Response to Learning: Able to verbalize current knowledge/experience        Endings: None Reported     Modes of Intervention: Education        Discipline Responsible: Psychoeducational Specialist        Signature:  Eliane Edwards

## 2024-03-04 NOTE — PLAN OF CARE
Problem: Safety - Adult  Goal: Free from fall injury  Outcome: Progressing     Problem: Self Harm/Suicidality  Goal: Will have no self-injury during hospital stay  Description: INTERVENTIONS:  1.  Ensure constant observer at bedside with Q15M safety checks  2.  Maintain a safe environment  3.  Secure patient belongings  4.  Ensure family/visitors adhere to safety recommendations  5.  Ensure safety tray has been added to patient's diet order  6.  Every shift and PRN: Re-assess suicidal risk via Frequent Screener    Outcome: Progressing  Flowsheets (Taken 3/4/2024 1500)  Will have no self-injury during hospital stay:   Ensure constant observer at bedside with Q15M safety checks   Maintain a safe environment     Problem: Depression  Goal: Will be euthymic at discharge  Description: INTERVENTIONS:  1. Administer medication as ordered  2. Provide emotional support via 1:1 interaction with staff  3. Encourage involvement in milieu/groups/activities  4. Monitor for social isolation  Outcome: Progressing     Problem: Psychosis  Goal: Will report no hallucinations or delusions  Description: INTERVENTIONS:  1. Administer medication as  ordered  2. Assist with reality testing to support increasing orientation  3. Assess if patient's hallucinations or delusions are encouraging self harm or harm to others and intervene as appropriate  Outcome: Progressing     Problem: Anxiety  Goal: Will report anxiety at manageable levels  Description: INTERVENTIONS:  1. Administer medication as ordered  2. Teach and rehearse alternative coping skills  3. Provide emotional support with 1:1 interaction with staff  Outcome: Progressing  Flowsheets (Taken 3/4/2024 1500)  Will report anxiety at manageable levels:   Administer medication as ordered   Provide emotional support with 1:1 interaction with staff   Teach and rehearse alternative coping skills     Problem: Drug Abuse/Detox  Goal: Will have no detox symptoms and will verbalize plan for

## 2024-03-05 VITALS
SYSTOLIC BLOOD PRESSURE: 124 MMHG | HEIGHT: 71 IN | RESPIRATION RATE: 18 BRPM | HEART RATE: 89 BPM | BODY MASS INDEX: 19.32 KG/M2 | OXYGEN SATURATION: 98 % | DIASTOLIC BLOOD PRESSURE: 90 MMHG | WEIGHT: 138 LBS | TEMPERATURE: 98.2 F

## 2024-03-05 PROCEDURE — 6370000000 HC RX 637 (ALT 250 FOR IP): Performed by: FAMILY MEDICINE

## 2024-03-05 PROCEDURE — 99239 HOSP IP/OBS DSCHRG MGMT >30: CPT | Performed by: PSYCHIATRY & NEUROLOGY

## 2024-03-05 PROCEDURE — 5130000000 HC BRIDGE APPOINTMENT

## 2024-03-05 PROCEDURE — 6370000000 HC RX 637 (ALT 250 FOR IP): Performed by: PSYCHIATRY & NEUROLOGY

## 2024-03-05 RX ORDER — FOLIC ACID 1 MG/1
1 TABLET ORAL DAILY
Qty: 30 TABLET | Refills: 0 | Status: SHIPPED | OUTPATIENT
Start: 2024-03-05

## 2024-03-05 RX ORDER — HYDROXYZINE HYDROCHLORIDE 25 MG/1
25 TABLET, FILM COATED ORAL 3 TIMES DAILY PRN
Qty: 30 TABLET | Refills: 0 | Status: SHIPPED | OUTPATIENT
Start: 2024-03-05 | End: 2024-03-15

## 2024-03-05 RX ORDER — MECOBALAMIN 5000 MCG
5 TABLET,DISINTEGRATING ORAL NIGHTLY
Qty: 30 TABLET | Refills: 0 | Status: SHIPPED | OUTPATIENT
Start: 2024-03-05

## 2024-03-05 RX ORDER — BUPROPION HYDROCHLORIDE 150 MG/1
150 TABLET ORAL DAILY
Qty: 30 TABLET | Refills: 0 | Status: SHIPPED | OUTPATIENT
Start: 2024-03-05

## 2024-03-05 RX ORDER — TRAZODONE HYDROCHLORIDE 50 MG/1
50 TABLET ORAL NIGHTLY
Qty: 30 TABLET | Refills: 0 | Status: SHIPPED | OUTPATIENT
Start: 2024-03-05

## 2024-03-05 RX ORDER — ERGOCALCIFEROL 1.25 MG/1
50000 CAPSULE ORAL WEEKLY
Qty: 5 CAPSULE | Refills: 0 | Status: SHIPPED | OUTPATIENT
Start: 2024-03-09

## 2024-03-05 RX ORDER — THIAMINE MONONITRATE (VIT B1) 100 MG
100 TABLET ORAL DAILY
Qty: 30 TABLET | Refills: 0 | Status: SHIPPED | OUTPATIENT
Start: 2024-03-05

## 2024-03-05 RX ADMIN — FOLIC ACID 1 MG: 1 TABLET ORAL at 08:09

## 2024-03-05 RX ADMIN — Medication 100 MG: at 08:09

## 2024-03-05 RX ADMIN — CYANOCOBALAMIN TAB 500 MCG 500 MCG: 500 TAB at 08:08

## 2024-03-05 RX ADMIN — BUPROPION HYDROCHLORIDE 150 MG: 150 TABLET, EXTENDED RELEASE ORAL at 08:08

## 2024-03-05 NOTE — DISCHARGE SUMMARY
Patient ID:  Johnnie Tompkins  [id number]  33 y.o.  1990    Admit date: 3/1/2024  Discharge date: 3/5/2024    Admitting Physician: Ruthie Cruz MD   Attending Physician: Ruthie Cruz MD  Discharge Provider: VANDA HOWE MD     Admission Diagnoses: Hallucinations [R44.3]  Acute alcoholic intoxication with complication (HCC) [F10.929]  Depression with suicidal ideation [F32.A, R45.851]  Unspecified mood (affective) disorder (HCC) [F39]    Discharge Diagnoses: Depression unspecified  Insomnia unspecified  Alcohol use disorder, severe  Tobacco use disorder  Treatment non-compliance  Unemployment  Homelessness  Vitamin B12 deficiency  Vitamin D deficiency    Admission Condition: poor    Discharged Condition: stable    Indication for Admission: Treatment noncompliance, alcohol intoxication, auditory hallucinations, suicidal ideations    HPI:  33-year-old white male with history of mood disorder, PTSD, substance abuse, who was admitted with auditory hallucinations as well as suicidal ideation.   on admission.  No history of withdrawal seizures.     Patient is observed resting in bed today.  He is calm and cooperative.  He denies suicidal and homicidal ideation.  He denies hallucinations and paranoia.  States he does not recall yesterday's events.  Admits to drinking alcohol.  Denies other substance use.  He admits to feeling depressed.  Reports poor sleep.  He is staying at a shelter and has no family support.  We discussed the need for sobriety and rehab treatment.  Patient denies the need for rehab.  States he is currently staying at the [facility] and would like to go back there upon discharge.  He is open to trying medication for depression.     PSYCHIATRIC HISTORY:    Diagnoses: Mood disorder, PTSD,  Suicide attempts/gestures: h/o cutting  Prior hospitalizations:    Medication trials: Bupropion, Depakote, Celexa, trazodone, Minipress    Mental health contact: Lost to follow-up   Head

## 2024-03-05 NOTE — DISCHARGE INSTR - DIET

## 2024-03-05 NOTE — PLAN OF CARE
Problem: Safety - Adult  Goal: Free from fall injury  3/5/2024 0839 by Liv Campos RN  Outcome: Completed  3/4/2024 2233 by Dona Mccarthy RN  Outcome: Progressing     Problem: Self Harm/Suicidality  Goal: Will have no self-injury during hospital stay  Description: INTERVENTIONS:  1.  Ensure constant observer at bedside with Q15M safety checks  2.  Maintain a safe environment  3.  Secure patient belongings  4.  Ensure family/visitors adhere to safety recommendations  5.  Ensure safety tray has been added to patient's diet order  6.  Every shift and PRN: Re-assess suicidal risk via Frequent Screener    3/5/2024 0839 by Liv Campos RN  Outcome: Completed  3/4/2024 2233 by Dona Mccarthy RN  Outcome: Progressing     Problem: Depression  Goal: Will be euthymic at discharge  Description: INTERVENTIONS:  1. Administer medication as ordered  2. Provide emotional support via 1:1 interaction with staff  3. Encourage involvement in milieu/groups/activities  4. Monitor for social isolation  3/5/2024 0839 by Liv Campos RN  Outcome: Completed  3/4/2024 2233 by Dona Mccarthy RN  Outcome: Progressing     Problem: Psychosis  Goal: Will report no hallucinations or delusions  Description: INTERVENTIONS:  1. Administer medication as  ordered  2. Assist with reality testing to support increasing orientation  3. Assess if patient's hallucinations or delusions are encouraging self harm or harm to others and intervene as appropriate  3/5/2024 0839 by Liv Campos RN  Outcome: Completed  3/4/2024 2233 by Dona Mccarthy RN  Outcome: Progressing     Problem: Anxiety  Goal: Will report anxiety at manageable levels  Description: INTERVENTIONS:  1. Administer medication as ordered  2. Teach and rehearse alternative coping skills  3. Provide emotional support with 1:1 interaction with staff  3/5/2024 0839 by Liv Campos RN  Outcome: Completed  3/4/2024 2233 by Dona Mccarthy RN  Outcome:

## 2024-03-06 NOTE — PROGRESS NOTES
Group Note    Date: 03/02/24  Start Time: 8:00 AM   End Time:8:30 AM     Number of Participants: 9    Type of Group: Community/Goal     Patient's Goal:  \"Improving\"    Notes:      Status After Intervention:      Participation Level: Active Listener    Participation Quality: Appropriate    Speech:  normal    Thought Process/Content: Logical    Mood:  Calm    Level of consciousness:  Alert    Response to Learning: Able to verbalize current knowledge/experience    Modes of Intervention: Education and Support    Discipline Responsible: Behavioral Health Technician     Signature:  Rc Titus  
                                                                Group Note    Date: 03/03/24  Start Time: 7:00 PM   End Time:7:30 PM     Number of Participants: 8    Type of Group: Wrap-Up     Patient's Goal:      Notes:  see wrap up sheet    Status After Intervention:  Unchanged    Participation Level: Minimal    Participation Quality: Appropriate    Speech:  normal    Thought Process/Content: Logical    Mood: anxious    Level of consciousness:  Alert and Oriented x4    Response to Learning: Able to verbalize current knowledge/experience    Modes of Intervention: Education and Support    Discipline Responsible: Registered Nurse     Signature:  Tiffany Castillo RN  
                                                                Group Note    Date: 03/03/24  Start Time: 7:45 AM   End Time:8:00 AM     Number of Participants: 9    Type of Group: Community/Goal     Patient's Goal:  \"relaxing\"    Notes:      Status After Intervention:  Improved    Participation Level: Active Listener    Participation Quality: Appropriate    Speech:  normal    Thought Process/Content: Logical    Mood:  calm    Level of consciousness:  Alert    Response to Learning: Able to verbalize current knowledge/experience    Modes of Intervention: Education and Support    Discipline Responsible: Behavioral Health Technician     Signature:  Karen Wills  
                                                                Group Note    Date: 03/04/24  Start Time: 8:00 AM   End Time:8:30 AM     Number of Participants: 8    Type of Group: Community/Goal     Patient's Goal:  improvements    Notes:      Status After Intervention:      Participation Level: Active Listener    Participation Quality: Appropriate    Speech:  normal    Thought Process/Content: Logical    Mood:  calm    Level of consciousness:  Alert    Response to Learning: Able to verbalize current knowledge/experience    Modes of Intervention: Education and Support    Discipline Responsible: Behavioral Health Technician     Signature:  Chen Kiser  
                                                  Admission Note      Reason for admission/Target Symptom: Per nursing admission assessment - Reason for Admission: Johnnie Tompkins is a 33 yr old male admitted for the folowing \" Suicidal Ideations and thoughts to cut wrist , reports hallucinations, auditory hallucinations unable to make out voices and visual of people.\"   Patient stated that he has been non-compliant with medications for months, and drinking whiskey daily up to a pint. Denies any history of withdrawal seizures or DT's. Denies any past suicidal attempts, history of rehab treatment two years ago for alcohol and methamphetamines. Reports after last admission here in November of 2023 he did not complete rehab after discharge.  Denies any homicidal ideations, and admits to paranoia. Reports drinking for months since last discharge , then tells this writer he just got out of senior living few days ago for shop- lifting in December. Reports plan and intent to cut wrist and seeking inpatient services prior to acting upon, per patient. Previous diagnosis of Mood Disorder, unspecified, Alcohol use disorder, history of cannabis and methamphetamine use disorder, Treatment non- compliance, Suicidal Ideations, Homelessness, and unemployment. Positive family history of suicide attempts and substance abuse. Limited social support, and family support by patient.    Diagnoses: Bipolar Affective Disorder, moderate; Alcohol Use Disorder; Anxiety; Suicidal Ideations    UDS: Negative     BAL: 236 @0450 on 3/1/24      SW will meet with treatment team to discuss patient's treatment including care planning, discharge planning, and follow-up needs. Patient has been admitted to [facility].     Treatment team will identify the patient's discharge needs. Appointments will be made for medication management and outpatient therapy/counseling. Pt confirmed the need for ongoing treatment post inpatient stay. Pt was 
                                                 Treatment Team Note:    Target Symptoms/Reason for admission: Per nursing admission assessment - Reason for Admission: Johnnie Tompkins is a 33 yr old male admitted for the folowing \" Suicidal Ideations and thoughts to cut wrist , reports hallucinations, auditory hallucinations unable to make out voices and visual of people.\"   Patient stated that he has been non-compliant with medications for months, and drinking whiskey daily up to a pint. Denies any history of withdrawal seizures or DT's. Denies any past suicidal attempts, history of rehab treatment two years ago for alcohol and methamphetamines. Reports after last admission here in November of 2023 he did not complete rehab after discharge.  Denies any homicidal ideations, and admits to paranoia. Reports drinking for months since last discharge , then tells this writer he just got out of [facility] few days ago for shop- lifting in December. Reports plan and intent to cut wrist and seeking inpatient services prior to acting upon, per patient. Previous diagnosis of Mood Disorder, unspecified, Alcohol use disorder, history of cannabis and methamphetamine use disorder, Treatment non- compliance, Suicidal Ideations, Homelessness, and unemployment. Positive family history of suicide attempts and substance abuse. Limited social support, and family support by patient.    Diagnoses per psych provider: Hallucinations [R44.3]  Acute alcoholic intoxication with complication (HCC) [F10.929]  Depression with suicidal ideation [F32.A, R45.851]  Unspecified mood (affective) disorder (HCC) [F39]    Therapist met with treatment team to discuss patients treatment and discharge plans.    Patient's aftercare plan is: SW will meet with patient to gather information    Aftercare appointments made: No - SW will make discharge appointments    Pt lives with:  Pt is homeless    Collateral obtained from:  Pt refused  Collateral obtained on:Pt 
      Behavioral Services  Medicare Certification Upon Admission    I certify that this patient's inpatient psychiatric hospital admission is medically necessary for:    [x] (1) Treatment which could reasonably be expected to improve this patient's condition,       [x] (2) Or for diagnostic study;     AND     [x](2) The inpatient psychiatric services are provided while the individual is under the care of a physician and are included in the individualized plan of care.    Estimated length of stay/service 3-5 days    Plan for post-hospital care TBA    Electronically signed by Ruthie Cruz MD on 3/2/2024 at 12:23 PM      
CLINICAL PHARMACY NOTE: MEDS TO BEDS    Total # of Prescriptions Filled: 5   The following medications were delivered to the patient:  Current Discharge Medication List        START taking these medications    Details   hydrOXYzine HCl (ATARAX) 25 MG tablet Take 1 tablet by mouth 3 times daily as needed for Anxiety  Qty: 30 tablet, Refills: 0      Ergocalciferol (VITAMIN D) 88102 units CAPS Take 50,000 Units by mouth once a week  Qty: 5 capsule, Refills: 0      vitamin B-12 500 MCG tablet Take 1 tablet by mouth daily  Qty: 30 tablet, Refills: 0      thiamine mononitrate (THIAMINE) 100 MG tablet Take 1 tablet by mouth daily  Qty: 30 tablet, Refills: 0      folic acid (FOLVITE) 1 MG tablet Take 1 tablet by mouth daily  Qty: 30 tablet, Refills: 0               Additional Documentation:    Delivered to nurse station.  
Department of Psychiatry  Attending Progress Note      SUBJECTIVE:    33 years old male with previous psychiatric history of unspecified mood disorder, PTSD, alcohol abuse, who has been admitted to our psychiatric unit secondary to treatment noncompliance, alcohol intoxication, blood alcohol level 236, auditory hallucinations and suicidal ideations.    Patient has been seen in treatment team room with presence of the patient's nurse.  Patient reported that his condition significantly improved during this hospital stay, stated that his mood is \"good\" today.  He endorses improved appetite and improved quality of sleep during the last night, stated that he did not experience any difficulties to fall asleep or stay asleep and woke up rested well in the morning.  Patient is compliant with currently prescribed medications and denies any side effects.  He denies any affective symptomatology today, denies any depression, anxiety or psychotic symptoms.  Patient denies any withdrawal symptoms from alcohol.  He attends group activities in the unit and participates in those activities.  Patient is social with medical staff and other patients in the unit.  He performed his ADLs today and took a shower.  Patient denies current active suicidal and homicidal ideations, denies any plan.  He denies auditory and visual hallucinations.  He did not endorse any delusions or paranoid thoughts.    OBJECTIVE    Physical  VITALS:  /82   Pulse (!) 107   Temp 98.4 °F (36.9 °C)   Resp 20   Ht 1.803 m (5' 10.98\")   Wt 62.6 kg (138 lb)   SpO2 98%   BMI 19.26 kg/m²   TEMPERATURE:  Current - Temp: 98.4 °F (36.9 °C); Max - Temp  Av.7 °F (36.5 °C)  Min: 96.8 °F (36 °C)  Max: 98.4 °F (36.9 °C)  RESPIRATIONS RANGE: Resp  Av  Min: 16  Max: 20  PULSE RANGE: Pulse  Av.4  Min: 99  Max: 112  BLOOD PRESSURE RANGE:  Systolic (24hrs), Av , Min:104 , Max:132  ; Diastolic (24hrs), Av, Min:81, Max:88   PULSE OXIMETRY RANGE: 
Discharge Note     Patient is discharging on this date. Patient denies SI, HI, and AVH at this time. Patient reports improvement in behavior and is leaving unit in overall good condition. SW and patient discussed patient's follow up appointments and importance of attending appointments as scheduled, patient voiced understanding and agreement. Patient and SW also discussed patient's safety plan and patient was able to verbally identify: warning signs, coping strategies, places and people that help make the patient feel better/distract negative thoughts, friends/family/agencies/professionals the patient can reach out to in a crisis, and something that is important to the patient/worth living for. Patient was provided the national suicide prevention hotline number (1-626.188.4917) as well as local community behavioral health ([facility]) crisis number for emergencies (2-690-561-8662).     Discharge Disposition: Homeless shelter - [facility]      Pt to follow up with:  [facility] on March 7 , 2024 at 10:00 AM for the intake appointment. Patient will follow up with [facility] ECTOR Barker and TYRESE Mcfarlane  On March 14 , 2024   at 2:00 PM for the medication management appointment.     Referral to outpatient tobacco cessation counseling treatment:  Patient refused referral to outpatient tobacco cessation counseling    SW offered to assist patient with transportation, patient accepted transportation assistance - "[employer]" transportation was provided by cab  
[facility] Adult Unit Daily Assessment  Nursing Progress Note    Room: [facility][note 3]/604-01   Name: Johnnie Tompkins   Age: 33 y.o.   Gender: male   Dx: Depression with suicidal ideation  Precautions: suicide risk, fall risk, and seizure precautions  Inpatient Status: voluntary     SLEEP:  Sleep Quality Good  Sleep Medications: Yes; melatonin 5mg, trazadone 50mg   PRN Sleep Meds: No     MEDICAL:  Other PRN Meds: No   Med Compliant: Yes  Accu-Chek: No  Oxygen/CPAP/BiPAP: No  CIWA/CINA: Yes; 0   PAIN Assessment: denies  Side Effects from medication: none voiced    Metabolic Screening:  Lab Results   Component Value Date    LABA1C 4.6 03/02/2024     Lab Results   Component Value Date    CHOL 116 (L) 02/07/2022     Lab Results   Component Value Date    TRIG 98 02/07/2022     Lab Results   Component Value Date    HDL 52 (L) 02/07/2022     No components found for: \"LDLCAL\"  No components found for: \"LABVLDL\"  Body mass index is 19.26 kg/m².  BP Readings from Last 2 Encounters:   03/03/24 122/88   11/30/23 (!) 113/58       Medical Bed:   Is patient in a medical bed? no   If medical bed is in use, has nursing secured room while patient is awake and out of the room? NA  Has safety checks by nursing been completed on the bed/room this shift? yes    Protective Factors:  Patient identifies protective factors with nursing staff as follows:   Identifies reasons for living: Yes   Supportive Social Network or family: No    Belief that suicide is immoral/high spirituality: Yes   Responsibility to family or others/living with family: No   Fear of death or dying due to pain and suffering: Yes   Engaged in work or school: No  If Patient is unable to identify, reason why? N/A    Depression: denies   Anxiety: denies   SI denies suicidal ideation   Risk of Suicide: No Risk  HI Negative for homicidal ideation        AVH:no If Hallucinations are present, describe? N/A    Appetite: good   Percent Meals: no meals consumed during this shift   Social: 
Follow up call completed. Writer was not able to speak with the patient. Patient did not have any questions regarding their discharge.    
[facility] Adult Unit Daily Assessment  Nursing Progress Note    [facility]   Name: Johnnie Tompkins   Age: 33 y.o.   Gender: male   Dx: Depression with suicidal ideation  Precautions: suicide risk  Inpatient Status: voluntary     SLEEP:  Sleep Quality Good  Sleep Medications: Yes   PRN Sleep Meds: Yes     MEDICAL:  Other PRN Meds: Yes   Med Compliant: Yes  Accu-Chek: No  Oxygen/CPAP/BiPAP: No  CIWA/CINA: Yes   PAIN Assessment: none  Side Effects from medication: No    Metabolic Screening:  Lab Results   Component Value Date    LABA1C 4.6 03/02/2024     Lab Results   Component Value Date    CHOL 116 (L) 02/07/2022     Lab Results   Component Value Date    TRIG 98 02/07/2022     Lab Results   Component Value Date    HDL 52 (L) 02/07/2022     No components found for: \"LDLCAL\"  No components found for: \"LABVLDL\"  Body mass index is 19.26 kg/m².  BP Readings from Last 2 Encounters:   03/04/24 115/78   11/30/23 (!) 113/58       Medical Bed:   Is patient in a medical bed? no   If medical bed is in use, has nursing secured room while patient is awake and out of the room? NA  Has safety checks by nursing been completed on the bed/room this shift? NA    Protective Factors:  Patient identifies protective factors with nursing staff as follows:   Identifies reasons for living: Yes   Supportive Social Network or family: Yes    Belief that suicide is immoral/high spirituality: Yes   Responsibility to family or others/living with family: Yes   Fear of death or dying due to pain and suffering: Yes   Engaged in work or school: No  If Patient is unable to identify, reason why?     Depression: 0   Anxiety: 0   SI denies suicidal ideation   Risk of Suicide: No Risk  HI Negative for homicidal ideation        AVH:no If Hallucinations are present, describe?     Appetite: good   Percent Meals: 75%   Social: No   Speech: normal   Appearance: disheveled    GROUP:  Group Participation: No  Participation Quality: None    Notes: Patient is 
Progress Note  Johnnie Tompkins  3/3/2024 10:59 AM  Subjective:   Admit Date:   3/1/2024      CC/ADMIT DX:       Interval History:   Reviewed overnight events and nursing notes.  He has had no new medical issues.     I have reviewed all labs/diagnostics from the last 24hrs.       ROS:   I have done a 10 point ROS and all are negative, except what is mentioned in the HPI.    ADULT DIET; Regular; Safety Tray; Safety Tray (Disposables)    Medications:      vitamin D  50,000 Units Oral Weekly    vitamin B-12  500 mcg Oral Daily    thiamine  100 mg Oral Daily    folic acid  1 mg Oral Daily    nicotine  1 patch TransDERmal Daily    traZODone  50 mg Oral Nightly    melatonin  5 mg Oral Nightly           Objective:   Vitals: BP (!) 128/92   Pulse (!) 102   Temp 99 °F (37.2 °C) (Temporal)   Resp 16   Ht 1.803 m (5' 10.98\")   Wt 62.6 kg (138 lb)   SpO2 100%   BMI 19.26 kg/m²  No intake or output data in the 24 hours ending 03/03/24 1059  General appearance: alert and cooperative with exam  Extremities: extremities normal, atraumatic, no cyanosis or edema  Neurologic:  No obvious focal neurologic deficits.    Assessment and Plan:   Principal Problem:    Depression with suicidal ideation  Active Problems:    Unspecified mood (affective) disorder (HCC)  Resolved Problems:    * No resolved hospital problems. *    Elevated BP    Plan:   Continue present medication(s)    Follow with BP   Follow with Psych      Discharge planning:   home     Reviewed treatment plans with the patient and/or family.             Electronically signed by Sumeet Bills MD on 3/3/2024 at 10:59 AM  
Progress Note  Johnnie Tompkins  3/5/2024 11:28 AM  Subjective:   Admit Date:   3/1/2024      CC/ADMIT DX:       Interval History:   Reviewed overnight events and nursing notes.  He denies any new physical complaints.     I have reviewed all labs/diagnostics from the last 24hrs.       ROS:   I have done a 10 point ROS and all are negative, except what is mentioned in the HPI.    ADULT DIET; Regular; Safety Tray; Safety Tray (Disposables)    Medications:      buPROPion  150 mg Oral Daily    vitamin D  50,000 Units Oral Weekly    vitamin B-12  500 mcg Oral Daily    thiamine  100 mg Oral Daily    folic acid  1 mg Oral Daily    nicotine  1 patch TransDERmal Daily    traZODone  50 mg Oral Nightly    melatonin  5 mg Oral Nightly           Objective:   Vitals: BP (!) 124/90   Pulse 89   Temp 98.2 °F (36.8 °C)   Resp 18   Ht 1.803 m (5' 10.98\")   Wt 62.6 kg (138 lb)   SpO2 98%   BMI 19.26 kg/m²  No intake or output data in the 24 hours ending 03/05/24 1128  General appearance: alert and cooperative with exam  Extremities: extremities normal, atraumatic, no cyanosis or edema  Neurologic:  No obvious focal neurologic deficits.    Assessment and Plan:   Principal Problem:    Depression with suicidal ideation  Active Problems:    Unspecified mood (affective) disorder (HCC)  Resolved Problems:    * No resolved hospital problems. *    Elevated BP    Plan:   Continue present medication(s)    Monitor BP   Follow with Psych      Discharge planning:   home     Reviewed treatment plans with the patient and/or family.             Electronically signed by Sumeet Bills MD on 3/5/2024 at 11:28 AM  
Progress Note  Johnnie Tompkins  3/5/2024 12:52 PM  Subjective:   Admit Date:   3/1/2024      CC/ADMIT DX:       Interval History:   Reviewed overnight events and nursing notes.  He has had no new medical issues.     I have reviewed all labs/diagnostics from the last 24hrs.       ROS:   I have done a 10 point ROS and all are negative, except what is mentioned in the HPI.    ADULT DIET; Regular; Safety Tray; Safety Tray (Disposables)    Medications:      buPROPion  150 mg Oral Daily    vitamin D  50,000 Units Oral Weekly    vitamin B-12  500 mcg Oral Daily    thiamine  100 mg Oral Daily    folic acid  1 mg Oral Daily    nicotine  1 patch TransDERmal Daily    traZODone  50 mg Oral Nightly    melatonin  5 mg Oral Nightly           Objective:   Vitals: BP (!) 124/90   Pulse 89   Temp 98.2 °F (36.8 °C)   Resp 18   Ht 1.803 m (5' 10.98\")   Wt 62.6 kg (138 lb)   SpO2 98%   BMI 19.26 kg/m²  No intake or output data in the 24 hours ending 03/05/24 1252  General appearance: alert and cooperative with exam  Extremities: extremities normal, atraumatic, no cyanosis or edema  Neurologic:  No obvious focal neurologic deficits.    Assessment and Plan:   Principal Problem:    Depression with suicidal ideation  Active Problems:    Unspecified mood (affective) disorder (HCC)  Resolved Problems:    * No resolved hospital problems. *    Elevated BP    Plan:   Continue present medication(s)    Follow with BP   Follow with Psych      Discharge planning:   home     Reviewed treatment plans with the patient and/or family.             Electronically signed by Sumeet Bills MD on 3/5/2024 at 12:52 PM  
SW met with patient to complete psychosocial and lifetime CSSR-S on this date. Patients long and short-term goals discussed. Patient voiced understanding. Treatment plan sheet signed. Patient verbalized understanding of the treatment plan. Patient participated in goals and objectives of the treatment plan. Patient discussed safety plan with .    In the last 6 months has the patient been a danger to self: No  In the last 6 months has the patient been a danger to others: No  Legal Guardian/POA: No    Activity Assessment:  Skills: hunting, fishing  Talents: hunting, fishing  Interests: anything outdoors    Provided patient with [employer] handout entitled \"Quitting Smoking.\"  Reviewed handout with patient: addressing dangers of smoking, developing coping skills, and providing basic information about quitting.       Patient received all components practical counseling of tobacco practical counseling during the hospital stay.          
SW spoke with patient in regards to safe discharge planning. Patient is requesting to follow-up with [facility] for medication management as well as therapy services. Reports that he would like to go to [facility] and will need transportation assistance upon discharge.     SW has not contacted [facility] at this time due to facility being closed until Monday 03/04 at 8am. SW gave patient a list of sober living homes and homeless resources.   
[facility] Adult Unit Daily Assessment  Nursing Progress Note    Room: [facility]/604-01   Name: Johnnie Tompkins   Age: 33 y.o.   Gender: male   Dx: Depression with suicidal ideation  Precautions: suicide risk and fall risk  Inpatient Status: voluntary     SLEEP:  Sleep Quality Poor  Sleep Medications: Yes trazodone 50 mg melatoin 5 mg  PRN Sleep Meds: No     MEDICAL:  Other PRN Meds: Yes atarax 25 mg  Med Compliant: Yes  Accu-Chek: No  Oxygen/CPAP/BiPAP: No  CIWA/CINA: No   PAIN Assessment: none  Side Effects from medication: No    Metabolic Screening:  Lab Results   Component Value Date    LABA1C 4.8 12/13/2022     Lab Results   Component Value Date    CHOL 116 (L) 02/07/2022     Lab Results   Component Value Date    TRIG 98 02/07/2022     Lab Results   Component Value Date    HDL 52 (L) 02/07/2022     No components found for: \"LDLCAL\"  No components found for: \"LABVLDL\"  Body mass index is 19.26 kg/m².  BP Readings from Last 2 Encounters:   03/01/24 119/88   11/30/23 (!) 113/58       Medical Bed:   Is patient in a medical bed? no   If medical bed is in use, has nursing secured room while patient is awake and out of the room? NA  Has safety checks by nursing been completed on the bed/room this shift? yes    Protective Factors:  Patient identifies protective factors with nursing staff as follows:   Identifies reasons for living: Yes   Supportive Social Network or family: No    Belief that suicide is immoral/high spirituality: Yes   Responsibility to family or others/living with family: No   Fear of death or dying due to pain and suffering: Yes   Engaged in work or school: No  If Patient is unable to identify, reason why?     Depression: 10   Anxiety: 10   SI denies suicidal ideation   Risk of Suicide: Moderate Risk  HI Negative for homicidal ideation        AVH:yes If Hallucinations are present, describe? Sees people and hears them speaking in foreign languages    Appetite: decreased   Percent Meals: 75%   Social: No 
[facility] Adult Unit Daily Assessment  Nursing Progress Note    Room: [facility] [facility]   Name: Johnnie Tompkins   Age: 33 y.o.   Gender: male   Dx: Depression with suicidal ideation  Precautions: suicide risk, fall risk, and seizure precautions  Inpatient Status: voluntary     SLEEP:  Sleep Quality Good  Sleep Medications: Yes;melatonin 5mg, trazadone 50mg   PRN Sleep Meds: No     MEDICAL:  Other PRN Meds: No   Med Compliant: Yes  Accu-Chek: No  Oxygen/CPAP/BiPAP: No  CIWA/CINA: No   PAIN Assessment: denies  Side Effects from medication: none voiced    Metabolic Screening:  Lab Results   Component Value Date    LABA1C 4.6 03/02/2024     Lab Results   Component Value Date    CHOL 116 (L) 02/07/2022     Lab Results   Component Value Date    TRIG 98 02/07/2022     Lab Results   Component Value Date    HDL 52 (L) 02/07/2022     No components found for: \"LDLCAL\"  No components found for: \"LABVLDL\"  Body mass index is 19.26 kg/m².  BP Readings from Last 2 Encounters:   03/02/24 (!) 128/92   11/30/23 (!) 113/58       Medical Bed:   Is patient in a medical bed? no   If medical bed is in use, has nursing secured room while patient is awake and out of the room? NA  Has safety checks by nursing been completed on the bed/room this shift? yes    Protective Factors:  Patient identifies protective factors with nursing staff as follows:   Identifies reasons for living: Yes   Supportive Social Network or family: No    Belief that suicide is immoral/high spirituality: Yes   Responsibility to family or others/living with family: No   Fear of death or dying due to pain and suffering: Yes   Engaged in work or school: No  If Patient is unable to identify, reason why? N/A    Depression: denies   Anxiety: denies   SI denies suicidal ideation   Risk of Suicide: No Risk  HI Negative for homicidal ideation        AVH:no If Hallucinations are present, describe? N/A    Appetite: good   Percent Meals: no meals consumed during this shift   Social: No 
The patient arrived on the unit with security/nurse escort from the ER. He was properly dressed in purple scrubs. His possessions were in a bag and given to nurse at team station. The patient was calm and cooperative.Vitals were obtained and the patient was taken to his room and searched, no contraband found. Patient is familiar with the unit.   
Treatment Team Note:     Target Symptoms/Reason for admission: Per nursing admission assessment - Reason for Admission: Johnnie Tompkins is a 33 yr old male admitted for the folowing \" Suicidal Ideations and thoughts to cut wrist , reports hallucinations, auditory hallucinations unable to make out voices and visual of people.\"   Patient stated that he has been non-compliant with medications for months, and drinking whiskey daily up to a pint. Denies any history of withdrawal seizures or DT's. Denies any past suicidal attempts, history of rehab treatment two years ago for alcohol and methamphetamines. Reports after last admission here in November of 2023 he did not complete rehab after discharge.  Denies any homicidal ideations, and admits to paranoia. Reports drinking for months since last discharge , then tells this writer he just got out of shelter few days ago for shop- lifting in December. Reports plan and intent to cut wrist and seeking inpatient services prior to acting upon, per patient. Previous diagnosis of Mood Disorder, unspecified, Alcohol use disorder, history of cannabis and methamphetamine use disorder, Treatment non- compliance, Suicidal Ideations, Homelessness, and unemployment. Positive family history of suicide attempts and substance abuse. Limited social support, and family support by patient.     Diagnoses per psych provider: Hallucinations [R44.3]  Acute alcoholic intoxication with complication (HCC) [F10.929]  Depression with suicidal ideation [F32.A, R45.851]  Unspecified mood (affective) disorder (HCC) [F39]     Therapist met with treatment team to discuss patients treatment and discharge plans.     Patient's aftercare plan is: SW will meet with patient to gather information     Aftercare appointments made: yes     Pt lives with:  Pt is homeless     Collateral obtained from:  Pt refused  Collateral obtained on:Pt refused 03/02/24     Attending groups:  Minimal participation     Behavior: 
[facility] Daily Assessment  Nursing Progress Note    Room: [facility]604-01   Name: Johnnie Tompkins   Age: 33 y.o.   Gender: male   Dx: Depression with suicidal ideation  Precautions: suicide risk  Inpatient Status: voluntary     SLEEP:  Sleep Quality Good  Sleep Medications: Yes, Melatonin & Trazodone   PRN Sleep Meds: No     MEDICAL:  Other PRN Meds: No   Med Compliant: Yes  Accu-Chek: No  Oxygen/CPAP/BiPAP: No  CIWA/CINA: Yes, 0 at HS   PAIN Assessment: none  Side Effects from medication: No    Metabolic Screening:  Lab Results   Component Value Date    LABA1C 4.6 03/02/2024     Lab Results   Component Value Date    CHOL 116 (L) 02/07/2022     Lab Results   Component Value Date    TRIG 98 02/07/2022     Lab Results   Component Value Date    HDL 52 (L) 02/07/2022     No components found for: \"LDLCAL\"  No components found for: \"LABVLDL\"  Body mass index is 19.26 kg/m².  BP Readings from Last 2 Encounters:   03/04/24 119/83   11/30/23 (!) 113/58       Medical Bed:   Is patient in a medical bed? no   If medical bed is in use, has nursing secured room while patient is awake and out of the room? NA  Has safety checks by nursing been completed on the bed/room this shift? NA    Protective Factors:  Patient identifies protective factors with nursing staff as follows:   Identifies reasons for living: Yes   Supportive Social Network or family: No    Belief that suicide is immoral/high spirituality: Yes   Responsibility to family or others/living with family: No   Fear of death or dying due to pain and suffering: Yes   Engaged in work or school: No  If Patient is unable to identify, reason why? N/a    Depression: 0   Anxiety: 0   SI denies suicidal ideation   Risk of Suicide: No Risk  HI Negative for homicidal ideation        AVH:no If Hallucinations are present, describe? N/a    Appetite: good   Percent Meals: reports good appetite   Social: No   Speech: normal   Appearance: appropriately dressed and appropriately 
  Suicidal Ideations? No Risk of Suicide: No Risk   HI? Negative for homicidal ideation       AVH? DENIES      Status EXAM upon discharge:  Mental Status and Behavioral Exam  Normal: No  Level of Assistance: Independent/Self  Facial Expression: Flat  Affect: Congruent  Level of Consciousness: Alert  Frequency of Checks: 4 times per hour, close  Mood:Normal: Yes  Mood:  (denies depression and anxiety)  Motor Activity:Normal: Yes  Motor Activity:  (appropriate)  Eye Contact: Fair  Observed Behavior: Cooperative, Friendly  Sexual Misconduct History: Current - no  Preception: [place] to person, [place] to time, [place] to place, [place] to situation  Attention:Normal: Yes  Attention:  (concentration intact)  Thought Processes: Other (comment) (logical and goal-oriented)  Thought Content:Normal: Yes  Thought Content:  (denies SI, HI, and AVH)  Depression Symptoms: No problems reported or observed. (denies depression)  Anxiety Symptoms: No problems reported or observed. (denies anxiety)  Mellisa Symptoms: No problems reported or observed.  Hallucinations: None  Delusions: No  Memory:Normal: Yes  Memory:  (recent and remote memory intact)  Insight and Judgment: No  Insight and Judgment:  (limited insight and judgment)    AVS/Transition Record has been discussed with patient and a copy was given to the patient.  The AVS/Transition Record was faxed to the next level of care today.    Electronically signed by Liv Campos RN on 3/5/2024 at 10:28 AM         
  Thought Process:  circumstantial   Thought Content:  hallucinations, suicidal, and paranoia   Sensorium:  person, place, and situation   Cognition:  impaired due to suicidal ideations   Insight:  limited         Psychiatric Review Of Systems:     Recent Sleep changes: yes   Recent appetite changes: yes   Recent weight changes/Pounds gained (+) or lost (-): no     Current living arrangement: Homelessness  Current Support System: Limited  Employment:Denies    Psychiatric Hospitalizations: Yes   Where & When:  Several admissions to [facility]  Outpatient Psychiatric Treatment: Denies    Family History:    Family history of mental illness: yes   \"Depression\",\"Anxiety\",\"Bipolar\",\"Schizophrenia\",\"Borderline\",\"ADHD\"}  Family members with suicide attempt: yes   If yes explain (attempted or completed): Father hung himself when patient was age 18.     Substance Abuse History:     SBIRT Completed: yes  Brief Intervention completed if needed:  (Yes/No)    Current ETOH LEVELS: On admission 236 current BAL 45 mg/dl.    ETOH Usage:     Amount drinking daily: heavy    Date of last drink: 03/01/24  Longest period of sobriety: While in [facility] three months    Substance/Chemical Abuse/Recreational Drug History:  Substance used: alcohol  Date of last substance use: Today   Tobacco Use: yes   History of rehab treatment: Yes  How many times in rehab: Three  Last time in rehab: 2018  Family history of substance abuse:biological father used drugs    Opiates: It was discussed with pt they would not be receiving opiates unless they were within 3 days post surgery/acute injury. Patient voiced understanding and agreed. Agreed       Medical History:     Medical Diagnosis/Issues: Denies  CT today in ED:no  Use of 02 or CPAP: no  Ambulatory: yes  Independent or Need assistance with Self Care: Independent    PCP: No primary care provider on file.     Current Medications:   Scheduled Meds: No current facility-administered medications for this 
roadblocks)                 Coping skills (new ways to manage stress, exercise, relaxation techniques, changing routine, distraction                                                      Basic information about quitting (benefits of quitting, techniques in how to quit, available resources   [facility]                                            Patient refused counseling yes  Patient has not smoked in the last 30 days   Patient offered nicotine patch.  Received yes  Refused   Patient is a non-smoker no     Admission to Unit:  Body assessment and safety check completed by Max/Al and no contraband discovered.    Patient belongings and valuables was cataloged and accounted for by Al.   Oriented to unit, unit policy and expectations:  yes    Reviewed and explained all legal documents:  yes    Education for Fall Prevention and Restraints given: yes    Patient signed all legal documents yes   Pt verbalizes understanding:yes       Medical:  Order for Medical H&P placed? yes    Was medical provider notified? yes      Electronically signed by Max Villafuerte RN on 3/1/24 at 3:43 PM CST

## 2024-09-06 NOTE — DISCHARGE INSTR - DIET
Good nutrition is important when healing from an illness, injury, or surgery. Follow any nutrition recommendations given to you during your hospital stay. If you were given an oral nutrition supplement while in the hospital, continue to take this supplement at home. You can take it with meals, in-between meals, and/or before bedtime. These supplements can be purchased at most local grocery stores, pharmacies, and chain Rebit-stores. If you have any questions about your diet or nutrition, call the hospital and ask for the dietitian. [Time Spent: ___ minutes] : I have spent [unfilled] minutes of time on the encounter which excludes teaching and separately reported services.

## 2025-04-14 ENCOUNTER — APPOINTMENT (OUTPATIENT)
Dept: ULTRASOUND IMAGING | Age: 35
DRG: 439 | End: 2025-04-14
Payer: MEDICAID

## 2025-04-14 ENCOUNTER — HOSPITAL ENCOUNTER (INPATIENT)
Age: 35
LOS: 1 days | Discharge: HOME | DRG: 439 | End: 2025-04-15
Attending: INTERNAL MEDICINE | Admitting: INTERNAL MEDICINE
Payer: MEDICAID

## 2025-04-14 DIAGNOSIS — R19.7 NAUSEA VOMITING AND DIARRHEA: Primary | ICD-10-CM

## 2025-04-14 DIAGNOSIS — F10.939 ALCOHOL WITHDRAWAL SYNDROME WITH COMPLICATION (HCC): ICD-10-CM

## 2025-04-14 DIAGNOSIS — R11.0 INTRACTABLE NAUSEA: ICD-10-CM

## 2025-04-14 DIAGNOSIS — R11.2 NAUSEA VOMITING AND DIARRHEA: Primary | ICD-10-CM

## 2025-04-14 PROBLEM — R74.01 TRANSAMINITIS: Status: ACTIVE | Noted: 2025-04-14

## 2025-04-14 PROBLEM — K85.20 ALCOHOL-INDUCED ACUTE PANCREATITIS WITHOUT INFECTION OR NECROSIS: Status: ACTIVE | Noted: 2025-04-14

## 2025-04-14 LAB
ALBUMIN SERPL-MCNC: 4.9 G/DL (ref 3.5–5.2)
ALP SERPL-CCNC: 77 U/L (ref 40–129)
ALT SERPL-CCNC: 58 U/L (ref 10–50)
AMPHET UR QL SCN: NEGATIVE
ANION GAP SERPL CALCULATED.3IONS-SCNC: 22 MMOL/L (ref 8–16)
AST SERPL-CCNC: 408 U/L (ref 10–50)
BACTERIA #/AREA URNS HPF: ABNORMAL /HPF
BARBITURATES UR QL SCN: NEGATIVE
BASOPHILS # BLD: 0 K/UL (ref 0–0.2)
BASOPHILS NFR BLD: 0.3 % (ref 0–1)
BENZODIAZ UR QL SCN: POSITIVE
BILIRUB SERPL-MCNC: 1 MG/DL (ref 0.2–1.2)
BILIRUB UR QL STRIP: ABNORMAL
BUN SERPL-MCNC: 12 MG/DL (ref 6–20)
BUPRENORPHINE URINE: NEGATIVE
CALCIUM SERPL-MCNC: 9.6 MG/DL (ref 8.6–10)
CANNABINOIDS UR QL SCN: NEGATIVE
CHLORIDE SERPL-SCNC: 93 MMOL/L (ref 98–107)
CLARITY UR: CLEAR
CO2 SERPL-SCNC: 21 MMOL/L (ref 22–29)
COCAINE UR QL SCN: NEGATIVE
COLOR UR: ABNORMAL
CREAT SERPL-MCNC: 1.1 MG/DL (ref 0.7–1.2)
DRUG SCREEN COMMENT UR-IMP: ABNORMAL
EOSINOPHIL # BLD: 0 K/UL (ref 0–0.6)
EOSINOPHIL NFR BLD: 0.2 % (ref 0–5)
ERYTHROCYTE [DISTWIDTH] IN BLOOD BY AUTOMATED COUNT: 13.1 % (ref 11.5–14.5)
ETHANOLAMINE SERPL-MCNC: <11 MG/DL (ref 0–11)
FENTANYL SCREEN, URINE: NEGATIVE
GLUCOSE SERPL-MCNC: 110 MG/DL (ref 70–99)
GLUCOSE UR STRIP.AUTO-MCNC: NEGATIVE MG/DL
HCT VFR BLD AUTO: 47.9 % (ref 42–52)
HGB BLD-MCNC: 17 G/DL (ref 14–18)
HGB UR STRIP.AUTO-MCNC: ABNORMAL MG/L
HYALINE CASTS #/AREA URNS LPF: ABNORMAL /LPF (ref 0–5)
IMM GRANULOCYTES # BLD: 0 K/UL
KETONES UR STRIP.AUTO-MCNC: =>160 MG/DL
LEUKOCYTE ESTERASE UR QL STRIP.AUTO: ABNORMAL
LIPASE SERPL-CCNC: 144 U/L (ref 13–60)
LYMPHOCYTES # BLD: 1.1 K/UL (ref 1.1–4.5)
LYMPHOCYTES NFR BLD: 11.6 % (ref 20–40)
MCH RBC QN AUTO: 33.6 PG (ref 27–31)
MCHC RBC AUTO-ENTMCNC: 35.5 G/DL (ref 33–37)
MCV RBC AUTO: 94.7 FL (ref 80–94)
METHADONE UR QL SCN: NEGATIVE
METHAMPHETAMINE, URINE: NEGATIVE
MONOCYTES # BLD: 0.9 K/UL (ref 0–0.9)
MONOCYTES NFR BLD: 9.4 % (ref 0–10)
MUCOUS THREADS URNS QL MICRO: ABNORMAL /LPF
NEUTROPHILS # BLD: 7.4 K/UL (ref 1.5–7.5)
NEUTS SEG NFR BLD: 78.1 % (ref 50–65)
NITRITE UR QL STRIP.AUTO: NEGATIVE
OPIATES UR QL SCN: NEGATIVE
OXYCODONE UR QL SCN: NEGATIVE
PCP UR QL SCN: NEGATIVE
PH UR STRIP.AUTO: 6 [PH] (ref 5–8)
PLATELET # BLD AUTO: 131 K/UL (ref 130–400)
PMV BLD AUTO: 9.3 FL (ref 9.4–12.4)
POTASSIUM SERPL-SCNC: 3.5 MMOL/L (ref 3.5–5.1)
PROT SERPL-MCNC: 8.5 G/DL (ref 6.4–8.3)
PROT UR STRIP.AUTO-MCNC: 300 MG/DL
RBC # BLD AUTO: 5.06 M/UL (ref 4.7–6.1)
RBC #/AREA URNS HPF: ABNORMAL /HPF (ref 0–2)
SODIUM SERPL-SCNC: 136 MMOL/L (ref 136–145)
SP GR UR STRIP.AUTO: 1.02 (ref 1–1.03)
SQUAMOUS #/AREA URNS HPF: ABNORMAL /HPF
TRICYCLIC ANTIDEPRESSANTS, UR: NEGATIVE
UROBILINOGEN UR STRIP.AUTO-MCNC: 1 E.U./DL
WBC # BLD AUTO: 9.5 K/UL (ref 4.8–10.8)
WBC #/AREA URNS HPF: ABNORMAL /HPF (ref 0–5)

## 2025-04-14 PROCEDURE — 85025 COMPLETE CBC W/AUTO DIFF WBC: CPT

## 2025-04-14 PROCEDURE — 82077 ASSAY SPEC XCP UR&BREATH IA: CPT

## 2025-04-14 PROCEDURE — 6360000002 HC RX W HCPCS: Performed by: NURSE PRACTITIONER

## 2025-04-14 PROCEDURE — 83690 ASSAY OF LIPASE: CPT

## 2025-04-14 PROCEDURE — 6360000002 HC RX W HCPCS: Performed by: PHYSICIAN ASSISTANT

## 2025-04-14 PROCEDURE — 80053 COMPREHEN METABOLIC PANEL: CPT

## 2025-04-14 PROCEDURE — 36415 COLL VENOUS BLD VENIPUNCTURE: CPT

## 2025-04-14 PROCEDURE — 2580000003 HC RX 258: Performed by: NURSE PRACTITIONER

## 2025-04-14 PROCEDURE — 6370000000 HC RX 637 (ALT 250 FOR IP): Performed by: PHYSICIAN ASSISTANT

## 2025-04-14 PROCEDURE — 76705 ECHO EXAM OF ABDOMEN: CPT

## 2025-04-14 PROCEDURE — G0480 DRUG TEST DEF 1-7 CLASSES: HCPCS

## 2025-04-14 PROCEDURE — 96375 TX/PRO/DX INJ NEW DRUG ADDON: CPT

## 2025-04-14 PROCEDURE — 2500000003 HC RX 250 WO HCPCS: Performed by: NURSE PRACTITIONER

## 2025-04-14 PROCEDURE — 80307 DRUG TEST PRSMV CHEM ANLYZR: CPT

## 2025-04-14 PROCEDURE — 94760 N-INVAS EAR/PLS OXIMETRY 1: CPT

## 2025-04-14 PROCEDURE — 1200000000 HC SEMI PRIVATE

## 2025-04-14 PROCEDURE — HZ2ZZZZ DETOXIFICATION SERVICES FOR SUBSTANCE ABUSE TREATMENT: ICD-10-PCS | Performed by: INTERNAL MEDICINE

## 2025-04-14 PROCEDURE — 6370000000 HC RX 637 (ALT 250 FOR IP): Performed by: NURSE PRACTITIONER

## 2025-04-14 PROCEDURE — 81001 URINALYSIS AUTO W/SCOPE: CPT

## 2025-04-14 PROCEDURE — 2580000003 HC RX 258: Performed by: PHYSICIAN ASSISTANT

## 2025-04-14 PROCEDURE — 99285 EMERGENCY DEPT VISIT HI MDM: CPT

## 2025-04-14 PROCEDURE — 96374 THER/PROPH/DIAG INJ IV PUSH: CPT

## 2025-04-14 RX ORDER — LORAZEPAM 1 MG/1
1 TABLET ORAL
Status: DISCONTINUED | OUTPATIENT
Start: 2025-04-14 | End: 2025-04-15 | Stop reason: HOSPADM

## 2025-04-14 RX ORDER — FOLIC ACID 1 MG/1
1 TABLET ORAL DAILY
Status: DISCONTINUED | OUTPATIENT
Start: 2025-04-15 | End: 2025-04-15 | Stop reason: HOSPADM

## 2025-04-14 RX ORDER — FOLIC ACID 1 MG/1
1 TABLET ORAL DAILY
Status: DISCONTINUED | OUTPATIENT
Start: 2025-04-14 | End: 2025-04-14 | Stop reason: SDUPTHER

## 2025-04-14 RX ORDER — SODIUM CHLORIDE 9 MG/ML
INJECTION, SOLUTION INTRAVENOUS PRN
Status: DISCONTINUED | OUTPATIENT
Start: 2025-04-14 | End: 2025-04-15 | Stop reason: HOSPADM

## 2025-04-14 RX ORDER — SODIUM CHLORIDE 0.9 % (FLUSH) 0.9 %
5-40 SYRINGE (ML) INJECTION PRN
Status: DISCONTINUED | OUTPATIENT
Start: 2025-04-14 | End: 2025-04-15 | Stop reason: HOSPADM

## 2025-04-14 RX ORDER — LORAZEPAM 2 MG/1
4 TABLET ORAL
Status: DISCONTINUED | OUTPATIENT
Start: 2025-04-14 | End: 2025-04-15 | Stop reason: HOSPADM

## 2025-04-14 RX ORDER — ONDANSETRON 4 MG/1
4 TABLET, ORALLY DISINTEGRATING ORAL EVERY 8 HOURS PRN
Status: DISCONTINUED | OUTPATIENT
Start: 2025-04-14 | End: 2025-04-15 | Stop reason: HOSPADM

## 2025-04-14 RX ORDER — ACETAMINOPHEN 650 MG/1
650 SUPPOSITORY RECTAL EVERY 6 HOURS PRN
Status: DISCONTINUED | OUTPATIENT
Start: 2025-04-14 | End: 2025-04-15 | Stop reason: HOSPADM

## 2025-04-14 RX ORDER — MAGNESIUM SULFATE IN WATER 40 MG/ML
2000 INJECTION, SOLUTION INTRAVENOUS PRN
Status: DISCONTINUED | OUTPATIENT
Start: 2025-04-14 | End: 2025-04-15 | Stop reason: HOSPADM

## 2025-04-14 RX ORDER — MULTIVITAMIN WITH IRON
1 TABLET ORAL DAILY
Status: DISCONTINUED | OUTPATIENT
Start: 2025-04-14 | End: 2025-04-14 | Stop reason: SDUPTHER

## 2025-04-14 RX ORDER — METOCLOPRAMIDE HYDROCHLORIDE 5 MG/ML
10 INJECTION INTRAMUSCULAR; INTRAVENOUS ONCE
Status: COMPLETED | OUTPATIENT
Start: 2025-04-14 | End: 2025-04-14

## 2025-04-14 RX ORDER — ENOXAPARIN SODIUM 100 MG/ML
40 INJECTION SUBCUTANEOUS DAILY
Status: DISCONTINUED | OUTPATIENT
Start: 2025-04-14 | End: 2025-04-15 | Stop reason: HOSPADM

## 2025-04-14 RX ORDER — LORAZEPAM 2 MG/ML
1 INJECTION INTRAMUSCULAR
Status: DISCONTINUED | OUTPATIENT
Start: 2025-04-14 | End: 2025-04-15 | Stop reason: HOSPADM

## 2025-04-14 RX ORDER — LORAZEPAM 2 MG/ML
1 INJECTION INTRAMUSCULAR ONCE
Status: COMPLETED | OUTPATIENT
Start: 2025-04-14 | End: 2025-04-14

## 2025-04-14 RX ORDER — ACETAMINOPHEN 325 MG/1
650 TABLET ORAL EVERY 6 HOURS PRN
Status: DISCONTINUED | OUTPATIENT
Start: 2025-04-14 | End: 2025-04-15 | Stop reason: HOSPADM

## 2025-04-14 RX ORDER — 0.9 % SODIUM CHLORIDE 0.9 %
1000 INTRAVENOUS SOLUTION INTRAVENOUS ONCE
Status: COMPLETED | OUTPATIENT
Start: 2025-04-14 | End: 2025-04-14

## 2025-04-14 RX ORDER — LORAZEPAM 2 MG/1
2 TABLET ORAL
Status: DISCONTINUED | OUTPATIENT
Start: 2025-04-14 | End: 2025-04-15 | Stop reason: HOSPADM

## 2025-04-14 RX ORDER — ONDANSETRON 2 MG/ML
4 INJECTION INTRAMUSCULAR; INTRAVENOUS EVERY 6 HOURS PRN
Status: DISCONTINUED | OUTPATIENT
Start: 2025-04-14 | End: 2025-04-15 | Stop reason: HOSPADM

## 2025-04-14 RX ORDER — POTASSIUM CHLORIDE 7.45 MG/ML
10 INJECTION INTRAVENOUS PRN
Status: DISCONTINUED | OUTPATIENT
Start: 2025-04-14 | End: 2025-04-15 | Stop reason: HOSPADM

## 2025-04-14 RX ORDER — POTASSIUM CHLORIDE 1500 MG/1
40 TABLET, EXTENDED RELEASE ORAL PRN
Status: DISCONTINUED | OUTPATIENT
Start: 2025-04-14 | End: 2025-04-15 | Stop reason: HOSPADM

## 2025-04-14 RX ORDER — LORAZEPAM 2 MG/ML
3 INJECTION INTRAMUSCULAR
Status: DISCONTINUED | OUTPATIENT
Start: 2025-04-14 | End: 2025-04-15 | Stop reason: HOSPADM

## 2025-04-14 RX ORDER — SODIUM CHLORIDE 0.9 % (FLUSH) 0.9 %
5-40 SYRINGE (ML) INJECTION EVERY 12 HOURS SCHEDULED
Status: DISCONTINUED | OUTPATIENT
Start: 2025-04-14 | End: 2025-04-15 | Stop reason: HOSPADM

## 2025-04-14 RX ORDER — POLYETHYLENE GLYCOL 3350 17 G/17G
17 POWDER, FOR SOLUTION ORAL DAILY PRN
Status: DISCONTINUED | OUTPATIENT
Start: 2025-04-14 | End: 2025-04-15 | Stop reason: HOSPADM

## 2025-04-14 RX ORDER — SODIUM CHLORIDE 9 MG/ML
INJECTION, SOLUTION INTRAVENOUS CONTINUOUS
Status: DISCONTINUED | OUTPATIENT
Start: 2025-04-14 | End: 2025-04-15

## 2025-04-14 RX ORDER — LORAZEPAM 2 MG/ML
4 INJECTION INTRAMUSCULAR
Status: DISCONTINUED | OUTPATIENT
Start: 2025-04-14 | End: 2025-04-15 | Stop reason: HOSPADM

## 2025-04-14 RX ORDER — GAUZE BANDAGE 2" X 2"
100 BANDAGE TOPICAL DAILY
Status: DISCONTINUED | OUTPATIENT
Start: 2025-04-15 | End: 2025-04-15 | Stop reason: HOSPADM

## 2025-04-14 RX ORDER — MULTIVITAMIN WITH IRON
1 TABLET ORAL DAILY
Status: DISCONTINUED | OUTPATIENT
Start: 2025-04-15 | End: 2025-04-15 | Stop reason: HOSPADM

## 2025-04-14 RX ORDER — ONDANSETRON 2 MG/ML
4 INJECTION INTRAMUSCULAR; INTRAVENOUS ONCE
Status: COMPLETED | OUTPATIENT
Start: 2025-04-14 | End: 2025-04-14

## 2025-04-14 RX ORDER — LORAZEPAM 2 MG/ML
2 INJECTION INTRAMUSCULAR
Status: DISCONTINUED | OUTPATIENT
Start: 2025-04-14 | End: 2025-04-15 | Stop reason: HOSPADM

## 2025-04-14 RX ORDER — GAUZE BANDAGE 2" X 2"
100 BANDAGE TOPICAL DAILY
Status: DISCONTINUED | OUTPATIENT
Start: 2025-04-14 | End: 2025-04-14 | Stop reason: SDUPTHER

## 2025-04-14 RX ADMIN — ONDANSETRON 4 MG: 2 INJECTION, SOLUTION INTRAMUSCULAR; INTRAVENOUS at 09:39

## 2025-04-14 RX ADMIN — Medication 100 MG: at 10:14

## 2025-04-14 RX ADMIN — ENOXAPARIN SODIUM 40 MG: 100 INJECTION SUBCUTANEOUS at 22:08

## 2025-04-14 RX ADMIN — LORAZEPAM 1 MG: 2 INJECTION INTRAMUSCULAR; INTRAVENOUS at 10:15

## 2025-04-14 RX ADMIN — FOLIC ACID 1 MG: 1 TABLET ORAL at 10:14

## 2025-04-14 RX ADMIN — METOCLOPRAMIDE HYDROCHLORIDE 10 MG: 5 INJECTION INTRAMUSCULAR; INTRAVENOUS at 11:35

## 2025-04-14 RX ADMIN — SODIUM CHLORIDE: 0.9 INJECTION, SOLUTION INTRAVENOUS at 22:07

## 2025-04-14 RX ADMIN — LORAZEPAM 1 MG: 1 TABLET ORAL at 22:08

## 2025-04-14 RX ADMIN — SODIUM CHLORIDE 1000 ML: 0.9 INJECTION, SOLUTION INTRAVENOUS at 09:39

## 2025-04-14 RX ADMIN — SODIUM CHLORIDE 1000 ML: 0.9 INJECTION, SOLUTION INTRAVENOUS at 11:35

## 2025-04-14 RX ADMIN — SODIUM CHLORIDE, PRESERVATIVE FREE 10 ML: 5 INJECTION INTRAVENOUS at 22:09

## 2025-04-14 RX ADMIN — THERA TABS 1 TABLET: TAB at 10:14

## 2025-04-14 SDOH — ECONOMIC STABILITY: INCOME INSECURITY: HOW HARD IS IT FOR YOU TO PAY FOR THE VERY BASICS LIKE FOOD, HOUSING, MEDICAL CARE, AND HEATING?: NOT VERY HARD

## 2025-04-14 SDOH — ECONOMIC STABILITY: FOOD INSECURITY: WITHIN THE PAST 12 MONTHS, YOU WORRIED THAT YOUR FOOD WOULD RUN OUT BEFORE YOU GOT MONEY TO BUY MORE.: NEVER TRUE

## 2025-04-14 SDOH — ECONOMIC STABILITY: INCOME INSECURITY: IN THE PAST 12 MONTHS, HAS THE ELECTRIC, GAS, OIL, OR WATER COMPANY THREATENED TO SHUT OFF SERVICE IN YOUR HOME?: NO

## 2025-04-14 ASSESSMENT — PATIENT HEALTH QUESTIONNAIRE - PHQ9
2. FEELING DOWN, DEPRESSED OR HOPELESS: SEVERAL DAYS
SUM OF ALL RESPONSES TO PHQ QUESTIONS 1-9: 2
SUM OF ALL RESPONSES TO PHQ QUESTIONS 1-9: 2
1. LITTLE INTEREST OR PLEASURE IN DOING THINGS: SEVERAL DAYS
SUM OF ALL RESPONSES TO PHQ QUESTIONS 1-9: 2
SUM OF ALL RESPONSES TO PHQ QUESTIONS 1-9: 2

## 2025-04-14 ASSESSMENT — ENCOUNTER SYMPTOMS
COUGH: 0
EYE DISCHARGE: 0
SHORTNESS OF BREATH: 0
BACK PAIN: 0
VOMITING: 1
ABDOMINAL PAIN: 1
PHOTOPHOBIA: 0
NAUSEA: 1
DIARRHEA: 1
ABDOMINAL PAIN: 0
APNEA: 0
COLOR CHANGE: 0
EYE ITCHING: 0

## 2025-04-14 ASSESSMENT — LIFESTYLE VARIABLES
HOW MANY STANDARD DRINKS CONTAINING ALCOHOL DO YOU HAVE ON A TYPICAL DAY: 1 OR 2
HOW OFTEN DO YOU HAVE A DRINK CONTAINING ALCOHOL: 4 OR MORE TIMES A WEEK

## 2025-04-14 NOTE — ED PROVIDER NOTES
RADIOLOGY:   Non-plain film images such as CT, Ultrasound and MRI are read by the radiologist. Plain radiographic images are visualized and preliminarilyinterpreted by Per Young MD with the below findings:        Interpretation per the Radiologist below, if available at the time of this note:    US GALLBLADDER RUQ   Final Result   1. Lower parenchyma echogenicity suggestive of severe homogeneous fatty infiltration and/or   chronic hepatocellular disease   2. Pancreas head and tail not evaluated /visible due to bowel gas artifact               ______________________________________    Electronically signed by: LUZ MARIA LYN M.D.   Date:     04/14/2025   Time:    14:05           LABS:  Labs Reviewed   CBC WITH AUTO DIFFERENTIAL - Abnormal; Notable for the following components:       Result Value    MCV 94.7 (*)     MCH 33.6 (*)     MPV 9.3 (*)     Neutrophils % 78.1 (*)     Lymphocytes % 11.6 (*)     All other components within normal limits   COMPREHENSIVE METABOLIC PANEL - Abnormal; Notable for the following components:    Chloride 93 (*)     CO2 21 (*)     Anion Gap 22 (*)     Glucose 110 (*)     Total Protein 8.5 (*)     ALT 58 (*)      (*)     All other components within normal limits   LIPASE - Abnormal; Notable for the following components:    Lipase 144 (*)     All other components within normal limits   GASTROINTESTINAL PANEL, MOLECULAR   DRUG SCRN, BUPRENORPHINE   ETHANOL   URINALYSIS WITH REFLEX TO CULTURE   LACTIC ACID       All other labs were within normal range or notreturned as of this dictation.    RE-ASSESSMENT          EMERGENCY DEPARTMENT COURSE and DIFFERENTIAL DIAGNOSIS/MDM:   Vitals:    Vitals:    04/14/25 1415 04/14/25 1430 04/14/25 1445 04/14/25 1555   BP:  120/77  124/72   Pulse: (!) 103 (!) 104 (!) 130 85   Resp:    20   Temp:    98.8 °F (37.1 °C)   TempSrc:    Oral   SpO2: 96% 96% 98% 99%   Weight:       Height:           EKG shows a sinus tachycardia 119 with no ST

## 2025-04-14 NOTE — H&P
[facility]      Hospitalist - History & Physical      PCP: No primary care provider on file.    Date of Admission: 4/14/2025    Date of Service: 4/14/2025    Chief Complaint:  Intractable nausea and vomiting with diarrhea    History Of Present Illness:   The patient is a 34 y.o. male with a PMH of alcohol abuse, bipolar I, and PTSD who presented to [facility] ED on 4/14/2025 complaining of intractable nausea and vomiting for ~1 week and 3 day hx of diarrhea. He states that he drinks ETOH \"sometimes\", but is quite evasive on further questioning regarding ETOH use or drug abuse. He does have a previous hx of substance abuse.  The patient has been homeless previously, however, he has been living with his grandmother since he December 2024. His grandmother voiced concern regarding his depression and his compliance with his medications.  He reported subjective fevers and chills.  Denies suspicious foods or recent ill contacts.  Denied chest pain, shortness of breath or leg swelling.  He does complain of generalized abdominal discomfort described as aching and burning.  ED workup revealed CL 93, CO2 21, BUN 29 and creatinine 1.1.  ALT 58 .  Ethanol level less than 11.  WBC 9.5, H&H 17.0/47.9 with a platelet count of 131.  He was found to be significantly tachycardic upon arrival with a heart rate of 138.  CIWA score was scored at 17.  Lipase-144.  He was Zofran-despite Zofran he continued to have nausea and vomiting in the ED.  EKG sinus tachycardia 119.  The patient will be admitted to hospital medicine for pancreatitis alcohol induced and probable alcohol withdrawal.    Past Medical History:        Diagnosis Date    Alcohol abuse     Bipolar 1 disorder (HCC)     Post traumatic stress disorder (PTSD)        Past Surgical History:        Procedure Laterality Date    OTHER SURGICAL HISTORY      cut tendon in right little finger       Home Medications:  Prior to Admission medications    Medication Sig Start Date

## 2025-04-14 NOTE — CARE COORDINATION
Sw met with pt and pts grandmother in which pt currently lives with his grandmother but grandmother states that they are working on getting him a place to live. Sw gave housing resources. Brennan also discussed alcohol rehab in which pt is not interested.  Electronically signed by Angelique Owusu on 4/14/2025 at 11:20 AM

## 2025-04-14 NOTE — ED NOTES
ED TO INPATIENT SBAR HANDOFF    Patient Name: Johnnie Tompkins   : 1990  34 y.o.   Family/Caregiver Present: Yes  Code Status Order: Prior    C-SSRS: Risk of Suicide: No Risk  Sitter No  Restraints:         Situation  Chief Complaint:   Chief Complaint   Patient presents with    Vomiting     Emesis and diarrhea x1 week    Diarrhea    Tinnitus     Onset today     Patient Diagnosis: Intractable nausea and vomiting [R11.2]     Brief Description of Patient's Condition:  34 y.o. male who presents to the emergency department with complaints of nausea vomiting diarrhea. Going on 1 week. Denies ETOH abuse or concern for withdrawal 138 HR on arrival however after further discussion it does sound like a pretty extensive hx of drug abuse and etoh use.  He denies recent abx. He denies blood in stool; the ringing in ears started today. Blood pressure doesn't appear grossly inappropriate on triage.      I talked to grandmother present with him states he has been homeless up until December. He is depressed can be violent but she feels safe. She tells me since moving in he has kept to himself and has more of a flat affect. She is worried about depression. Concern for compliance with meds. Sounds as though he has been admitted to psych unit before and the meds rx to him he could never fill due to homeless status. SW is talking to grandmother now. He continues to have nausea emesis after first dose zofran will trial reglan HR still fast CIWA score 17.      Mental Status: oriented, alert, coherent, logical, thought processes intact, and able to concentrate and follow conversation  Arrived from: home    Imaging:   US GALLBLADDER RUQ   Final Result   1. Lower parenchyma echogenicity suggestive of severe homogeneous fatty infiltration and/or   chronic hepatocellular disease   2. Pancreas head and tail not evaluated /visible due to bowel gas artifact               ______________________________________    Electronically signed

## 2025-04-15 VITALS
SYSTOLIC BLOOD PRESSURE: 120 MMHG | BODY MASS INDEX: 18.9 KG/M2 | HEIGHT: 71 IN | HEART RATE: 91 BPM | TEMPERATURE: 98.2 F | WEIGHT: 135 LBS | OXYGEN SATURATION: 97 % | DIASTOLIC BLOOD PRESSURE: 85 MMHG | RESPIRATION RATE: 16 BRPM

## 2025-04-15 LAB
ADV 40+41 DNA STL QL NAA+NON-PROBE: NOT DETECTED
ALBUMIN SERPL-MCNC: 3.9 G/DL (ref 3.5–5.2)
ALP SERPL-CCNC: 54 U/L (ref 40–129)
ALT SERPL-CCNC: 36 U/L (ref 10–50)
ANION GAP SERPL CALCULATED.3IONS-SCNC: 12 MMOL/L (ref 8–16)
AST SERPL-CCNC: 151 U/L (ref 10–50)
BASOPHILS # BLD: 0 K/UL (ref 0–0.2)
BASOPHILS NFR BLD: 0.7 % (ref 0–1)
BILIRUB SERPL-MCNC: 0.9 MG/DL (ref 0.2–1.2)
BUN SERPL-MCNC: 8 MG/DL (ref 6–20)
C CAYETANENSIS DNA STL QL NAA+NON-PROBE: NOT DETECTED
C COLI+JEJ+UPSA DNA STL QL NAA+NON-PROBE: NOT DETECTED
CALCIUM SERPL-MCNC: 8.6 MG/DL (ref 8.6–10)
CHLORIDE SERPL-SCNC: 100 MMOL/L (ref 98–107)
CO2 SERPL-SCNC: 24 MMOL/L (ref 22–29)
CREAT SERPL-MCNC: 0.6 MG/DL (ref 0.7–1.2)
CRYPTOSP DNA STL QL NAA+NON-PROBE: NOT DETECTED
E HISTOLYT DNA STL QL NAA+NON-PROBE: NOT DETECTED
EAEC PAA PLAS AGGR+AATA ST NAA+NON-PRB: NOT DETECTED
EC STX1+STX2 GENES STL QL NAA+NON-PROBE: NOT DETECTED
EOSINOPHIL # BLD: 0.1 K/UL (ref 0–0.6)
EOSINOPHIL NFR BLD: 1.4 % (ref 0–5)
EPEC EAE GENE STL QL NAA+NON-PROBE: NOT DETECTED
ERYTHROCYTE [DISTWIDTH] IN BLOOD BY AUTOMATED COUNT: 12.8 % (ref 11.5–14.5)
ETEC LTA+ST1A+ST1B TOX ST NAA+NON-PROBE: NOT DETECTED
G LAMBLIA DNA STL QL NAA+NON-PROBE: NOT DETECTED
GI PATH DNA+RNA PNL STL NAA+NON-PROBE: NOT DETECTED
GLUCOSE SERPL-MCNC: 86 MG/DL (ref 70–99)
HCT VFR BLD AUTO: 37.3 % (ref 42–52)
HGB BLD-MCNC: 13.1 G/DL (ref 14–18)
IMM GRANULOCYTES # BLD: 0 K/UL
INR PPP: 1.08 (ref 0.88–1.18)
LACTATE BLDV-SCNC: 0.4 MMOL/L (ref 0.5–1.9)
LYMPHOCYTES # BLD: 1 K/UL (ref 1.1–4.5)
LYMPHOCYTES NFR BLD: 23.3 % (ref 20–40)
MAGNESIUM SERPL-MCNC: 2.2 MG/DL (ref 1.6–2.6)
MCH RBC QN AUTO: 33.7 PG (ref 27–31)
MCHC RBC AUTO-ENTMCNC: 35.1 G/DL (ref 33–37)
MCV RBC AUTO: 95.9 FL (ref 80–94)
MONOCYTES # BLD: 0.4 K/UL (ref 0–0.9)
MONOCYTES NFR BLD: 8.4 % (ref 0–10)
NEUTROPHILS # BLD: 2.9 K/UL (ref 1.5–7.5)
NEUTS SEG NFR BLD: 66 % (ref 50–65)
NOROVIRUS GI+II RNA STL QL NAA+NON-PROBE: NOT DETECTED
P SHIGELLOIDES DNA STL QL NAA+NON-PROBE: NOT DETECTED
PLATELET # BLD AUTO: 77 K/UL (ref 130–400)
PMV BLD AUTO: 9 FL (ref 9.4–12.4)
POTASSIUM SERPL-SCNC: 3.3 MMOL/L (ref 3.5–5)
PROT SERPL-MCNC: 6.3 G/DL (ref 6.4–8.3)
PROTHROMBIN TIME: 13.9 SEC (ref 12–14.6)
RBC # BLD AUTO: 3.89 M/UL (ref 4.7–6.1)
RVA RNA STL QL NAA+NON-PROBE: NOT DETECTED
S ENT+BONG DNA STL QL NAA+NON-PROBE: NOT DETECTED
SAPO I+II+IV+V RNA STL QL NAA+NON-PROBE: NOT DETECTED
SHIGELLA SP+EIEC IPAH ST NAA+NON-PROBE: NOT DETECTED
SODIUM SERPL-SCNC: 136 MMOL/L (ref 136–145)
V CHOL+PARA+VUL DNA STL QL NAA+NON-PROBE: NOT DETECTED
V CHOLERAE DNA STL QL NAA+NON-PROBE: NOT DETECTED
WBC # BLD AUTO: 4.4 K/UL (ref 4.8–10.8)
Y ENTEROCOL DNA STL QL NAA+NON-PROBE: NOT DETECTED

## 2025-04-15 PROCEDURE — 36415 COLL VENOUS BLD VENIPUNCTURE: CPT

## 2025-04-15 PROCEDURE — 83735 ASSAY OF MAGNESIUM: CPT

## 2025-04-15 PROCEDURE — 87507 IADNA-DNA/RNA PROBE TQ 12-25: CPT

## 2025-04-15 PROCEDURE — 83605 ASSAY OF LACTIC ACID: CPT

## 2025-04-15 PROCEDURE — 99254 IP/OBS CNSLTJ NEW/EST MOD 60: CPT | Performed by: PSYCHIATRY & NEUROLOGY

## 2025-04-15 PROCEDURE — 85610 PROTHROMBIN TIME: CPT

## 2025-04-15 PROCEDURE — 85025 COMPLETE CBC W/AUTO DIFF WBC: CPT

## 2025-04-15 PROCEDURE — 6370000000 HC RX 637 (ALT 250 FOR IP): Performed by: NURSE PRACTITIONER

## 2025-04-15 PROCEDURE — 80053 COMPREHEN METABOLIC PANEL: CPT

## 2025-04-15 PROCEDURE — 2580000003 HC RX 258: Performed by: NURSE PRACTITIONER

## 2025-04-15 RX ORDER — MULTIVITAMIN WITH IRON
1 TABLET ORAL DAILY
Qty: 30 TABLET | Refills: 0 | Status: SHIPPED | OUTPATIENT
Start: 2025-04-16

## 2025-04-15 RX ADMIN — Medication 100 MG: at 08:16

## 2025-04-15 RX ADMIN — MULTIVITAMIN TABLET 1 TABLET: TABLET at 08:16

## 2025-04-15 RX ADMIN — SODIUM CHLORIDE: 0.9 INJECTION, SOLUTION INTRAVENOUS at 06:02

## 2025-04-15 RX ADMIN — FOLIC ACID 1 MG: 1 TABLET ORAL at 08:16

## 2025-04-15 NOTE — CONSULTS
[facility]  Psychiatry Consult    Reason for Consult/Chief Complaint: Concern   History of bipolar poorly controlled as he is not compliant with meds, homeless but recently moved in with his grandmother, alcohol abuse withdrawal admission    The primary source(s) of information include(s):  patient, patient's grandmother    The patient is a 34 y.o. male with previous psychiatric history of unspecified mood disorder, bipolar disorder, alcohol use disorder, stimulant use disorder, history of treatment noncompliance, who has been admitted to medical services secondary to nausea, vomiting and diarrhea.  Patient's UDS in ER was positive for benzodiazepines and his blood alcohol level was less than 11.  Patient was admitted to medical services with diagnosis of alcohol induced acute pancreatitis.    Patient is well-known to psychiatry due to multiple previous admissions to our psychiatric unit, secondary to treatment noncompliance, alcohol intoxication and suicidal ideations.  Last time patient was admitted to our psychiatric unit in March 2024.    Patient has been seen in his room with presence of the patient's grandmother.  Patient reported that after last discharge from the hospital he was taking prescribed psychotropic medication only for 1 month and then stopped it.   He stated that he spent some time \"on and off from skilled nursing\" and last time was released from skilled nursing in December 2024.  Patient's grandmother stated that patient was jailed first time at age 16 years old and since that age he spent a lot of time in skilled nursing.  Patient's grandmother also reported that patient was homeless for many years, however, after he was released from skilled nursing last time \"he became a different person\", and she let him stay in her house.  Patient reported that vodka is his drink of choice and he was drinking pint of vodka a day \"a few times a week\".  He reported that during the last week he was experiencing constant feeling

## 2025-04-15 NOTE — DISCHARGE INSTRUCTIONS
[facility]  Mental Health Resources*    Crisis Resources  988 Suicide and Crisis Hotline  If you or someone you know needs support now, call or text 988 or chat [facility]    Suicide Prevention Lifeline  0-335-046-LPWS(7280)    Crisis Text Link  Text [place] to 603018    [facility]  Emergency Crisis Intervention Line   333.904.5073    Mental Health Providers     [facility]   [facility]   [address] [address]   www.childwatchca.org   [phone]   Trauma (Child/Adolescent/Teen)  Patients under 18 years old accepted   Free Service  [facility]   [address] [place] [address] [address]   [phone]   Individual, couple, family counseling, children's counseling, depression, anxiety etc.  Patients under 18 years old accepted   Accepts Medicaid, Medicare, most Commercial Insurances  [facility]   [address] [address]   [email or web address]://[email or web address]/   [phone]   Patients under 18 years old accepted   Accepts Medicaid, Multiple Commercial   [facility]   [address] [place] [address]   https://[email or web address]   [phone]   Patients under 18 years old accepted    Accepts Most Commercial Insurances, [place] Medicaid, Medicare  [facility]  [email or web address]   Patients under 18 years old accepted    Accepts Medicaid, Medicare, Most Private Insurances  [facility]  [address]; [place]  [address]  [phone] option 6  [facility] - [id number]; [place]  [address]  [phone] option 5  [facility] - Information Age  [address]; [place] [address]  [phone] option 7  [facility]   [address] [place] [address]  [address] [facility]

## 2025-04-15 NOTE — PLAN OF CARE
Problem: Discharge Planning  Goal: Discharge to home or other facility with appropriate resources  Outcome: Progressing  Flowsheets (Taken 4/14/2025 5109)  Discharge to home or other facility with appropriate resources: Identify barriers to discharge with patient and caregiver     Problem: Safety - Adult  Goal: Free from fall injury  Outcome: Progressing  Flowsheets (Taken 4/15/2025 7696)  Free From Fall Injury: Instruct family/caregiver on patient safety     Problem: Chronic Conditions and Co-morbidities  Goal: Patient's chronic conditions and co-morbidity symptoms are monitored and maintained or improved  Outcome: Progressing  Flowsheets (Taken 4/14/2025 3241)  Care Plan - Patient's Chronic Conditions and Co-Morbidity Symptoms are Monitored and Maintained or Improved: Monitor and assess patient's chronic conditions and comorbid symptoms for stability, deterioration, or improvement     Problem: Neurosensory - Adult  Goal: Achieves stable or improved neurological status  Outcome: Progressing  Goal: Absence of seizures  Outcome: Progressing  Goal: Remains free of injury related to seizures activity  Outcome: Progressing     Problem: Gastrointestinal - Adult  Goal: Minimal or absence of nausea and vomiting  Outcome: Progressing  Goal: Maintains or returns to baseline bowel function  Outcome: Progressing  Goal: Maintains adequate nutritional intake  Outcome: Progressing     Problem: Metabolic/Fluid and Electrolytes - Adult  Goal: Electrolytes maintained within normal limits  Outcome: Progressing

## 2025-04-15 NOTE — PROGRESS NOTES
4 Eyes Skin Assessment     NAME:  Johnnie Tompkins  YOB: 1990  MEDICAL RECORD NUMBER:  [id number]    The patient is being assessed for  Admission    I agree that at least one RN has performed a thorough Head to Toe Skin Assessment on the patient. ALL assessment sites listed below have been assessed.      Areas assessed by both nurses:    Head, Face, Ears, Shoulders, Back, Chest, Arms, Elbows, Hands, Sacrum. Buttock, Coccyx, Ischium, Legs. Feet and Heels, and Under Medical Devices         Does the Patient have a Wound? No noted wound(s)       Xu Prevention initiated by RN: No  Wound Care Orders initiated by RN: No    Pressure Injury (Stage 3,4, Unstageable, DTI, NWPT, and Complex wounds) if present, place Wound referral order by RN under : No    New Ostomies, if present place, Ostomy referral order under : No     Nurse 1 eSignature: Electronically signed by Kacie Monroe RN on 4/14/25 at 11:08 PM CDT    **SHARE this note so that the co-signing nurse can place an eSignature**    Nurse 2 eSignature: Electronically signed by Liz Ortiz LPN on 4/14/25 at 11:09 PM CDT

## 2025-04-15 NOTE — DISCHARGE SUMMARY
Johnnie Tompkins  :  1990  MRN:  [id number]    Admit date:  2025  Discharge date:  4/15/2025    Discharging Physician:  Dr. Per Young    Advance Directive: Full Code    Consults: IP CONSULT TO PSYCHIATRY  IP CONSULT TO SOCIAL WORK  IP CONSULT TO SOCIAL WORK     Primary Care Physician:  No primary care provider on file.    Discharge Diagnoses:  Principal Problem:    Alcohol-induced acute pancreatitis without infection or necrosis  Active Problems:    Alcohol use disorder    Intractable nausea and vomiting    Diarrhea    Transaminitis  Resolved Problems:    * No resolved hospital problems. *      Portions of this note have been copied forward, however, changed to reflect the most current clinical status of this patient.    Hospital Course:   The patient is a 34 y.o. male with a PMH of alcohol abuse, bipolar I, and PTSD who presented to [facility] ED on 2025 complaining of intractable nausea and vomiting for ~1 week and 3 day hx of diarrhea. He stated that he drinks ETOH \"sometimes\", but is quite evasive on further questioning regarding ETOH use or drug abuse. He does have a previous hx of substance abuse.  The patient has been homeless previously, however, he has been living with his grandmother since he 2024. His grandmother voiced concern regarding his depression and his compliance with his medications.  He reported subjective fevers and chills.  Denied suspicious foods or recent ill contacts.  Denied chest pain, shortness of breath or leg swelling.  He complained of generalized abdominal discomfort described as aching and burning.  ED workup revealed CL 93, CO2 21, BUN 29 and creatinine 1.1.  ALT 58 .  Ethanol level less than 11.  WBC 9.5, H&H 17.0/47.9 with a platelet count of 131.  He was found to be significantly tachycardic upon arrival with a heart rate of 138.  CIWA score was scored at 17.  Lipase-144.  He was Zofran-despite Zofran he continued to have nausea and vomiting in the

## 2025-04-15 NOTE — PLAN OF CARE
Problem: Discharge Planning  Goal: Discharge to home or other facility with appropriate resources  4/15/2025 0950 by Shreyas Potter LPN  Outcome: Progressing  4/15/2025 0231 by Liz Ortiz LPN  Outcome: Progressing  Flowsheets (Taken 4/14/2025 2259)  Discharge to home or other facility with appropriate resources: Identify barriers to discharge with patient and caregiver     Problem: Safety - Adult  Goal: Free from fall injury  4/15/2025 0950 by Shreyas Potter LPN  Outcome: Progressing  4/15/2025 0231 by Liz Ortiz LPN  Outcome: Progressing  Flowsheets (Taken 4/15/2025 0229)  Free From Fall Injury: Instruct family/caregiver on patient safety     Problem: Chronic Conditions and Co-morbidities  Goal: Patient's chronic conditions and co-morbidity symptoms are monitored and maintained or improved  4/15/2025 0950 by Shreyas Potter LPN  Outcome: Progressing  4/15/2025 0231 by Liz Ortiz LPN  Outcome: Progressing  Flowsheets (Taken 4/14/2025 2076)  Care Plan - Patient's Chronic Conditions and Co-Morbidity Symptoms are Monitored and Maintained or Improved: Monitor and assess patient's chronic conditions and comorbid symptoms for stability, deterioration, or improvement     Problem: Neurosensory - Adult  Goal: Achieves stable or improved neurological status  4/15/2025 0950 by Shreyas Potter LPN  Outcome: Progressing  4/15/2025 0231 by Liz Ortiz LPN  Outcome: Progressing  Goal: Absence of seizures  4/15/2025 0950 by Shreyas Potter LPN  Outcome: Progressing  4/15/2025 0231 by Liz Ortiz LPN  Outcome: Progressing  Goal: Remains free of injury related to seizures activity  4/15/2025 0950 by Shreyas Potter LPN  Outcome: Progressing  4/15/2025 0231 by Liz Ortiz LPN  Outcome: Progressing     Problem: Gastrointestinal - Adult  Goal: Minimal or absence of nausea and vomiting  4/15/2025 0950 by Shreyas Potter LPN  Outcome: Progressing  4/15/2025

## 2025-04-19 LAB
EKG P AXIS: 56 DEGREES
EKG P-R INTERVAL: 152 MS
EKG Q-T INTERVAL: 302 MS
EKG QRS DURATION: 68 MS
EKG QTC CALCULATION (BAZETT): 405 MS
EKG T AXIS: 51 DEGREES